# Patient Record
Sex: MALE | Race: WHITE | NOT HISPANIC OR LATINO | Employment: OTHER | URBAN - METROPOLITAN AREA
[De-identification: names, ages, dates, MRNs, and addresses within clinical notes are randomized per-mention and may not be internally consistent; named-entity substitution may affect disease eponyms.]

---

## 2024-08-20 ENCOUNTER — HOSPITAL ENCOUNTER (EMERGENCY)
Facility: HOSPITAL | Age: 89
Discharge: HOME | End: 2024-08-20
Attending: EMERGENCY MEDICINE
Payer: COMMERCIAL

## 2024-08-20 ENCOUNTER — APPOINTMENT (EMERGENCY)
Dept: RADIOLOGY | Facility: HOSPITAL | Age: 89
End: 2024-08-20
Payer: COMMERCIAL

## 2024-08-20 ENCOUNTER — HOSPITAL ENCOUNTER (INPATIENT)
Facility: HOSPITAL | Age: 89
LOS: 4 days | Discharge: NURSING, REHAB OR LONG-TERM CARE | DRG: 177 | End: 2024-08-24
Attending: EMERGENCY MEDICINE | Admitting: INTERNAL MEDICINE
Payer: COMMERCIAL

## 2024-08-20 ENCOUNTER — APPOINTMENT (EMERGENCY)
Dept: RADIOLOGY | Facility: HOSPITAL | Age: 89
DRG: 177 | End: 2024-08-20
Payer: COMMERCIAL

## 2024-08-20 VITALS
OXYGEN SATURATION: 95 % | RESPIRATION RATE: 18 BRPM | DIASTOLIC BLOOD PRESSURE: 65 MMHG | TEMPERATURE: 98 F | HEART RATE: 68 BPM | WEIGHT: 189.82 LBS | SYSTOLIC BLOOD PRESSURE: 142 MMHG

## 2024-08-20 DIAGNOSIS — K59.09 OTHER CONSTIPATION: ICD-10-CM

## 2024-08-20 DIAGNOSIS — D69.6 THROMBOCYTOPENIA (HCC): ICD-10-CM

## 2024-08-20 DIAGNOSIS — R78.81 BACTEREMIA: ICD-10-CM

## 2024-08-20 DIAGNOSIS — E03.9 HYPOTHYROIDISM, UNSPECIFIED TYPE: ICD-10-CM

## 2024-08-20 DIAGNOSIS — R26.2 AMBULATORY DYSFUNCTION: ICD-10-CM

## 2024-08-20 DIAGNOSIS — G20.A1 PARKINSON DISEASE: ICD-10-CM

## 2024-08-20 DIAGNOSIS — I72.3 ANEURYSM OF COMMON ILIAC ARTERY (HCC): ICD-10-CM
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
## 2024-08-20 DIAGNOSIS — R53.1 GENERALIZED WEAKNESS: ICD-10-CM

## 2024-08-20 DIAGNOSIS — G20.B1 PARKINSON'S DISEASE WITH DYSKINESIA, UNSPECIFIED WHETHER MANIFESTATIONS FLUCTUATE: ICD-10-CM

## 2024-08-20 DIAGNOSIS — S09.90XA CLOSED HEAD INJURY, INITIAL ENCOUNTER: Primary | ICD-10-CM

## 2024-08-20 DIAGNOSIS — I48.0 PAROXYSMAL ATRIAL FIBRILLATION (HCC): ICD-10-CM

## 2024-08-20 DIAGNOSIS — E53.8 B12 DEFICIENCY: ICD-10-CM

## 2024-08-20 DIAGNOSIS — I10 PRIMARY HYPERTENSION: ICD-10-CM

## 2024-08-20 DIAGNOSIS — U07.1 COVID-19: Primary | ICD-10-CM

## 2024-08-20 PROBLEM — I25.10 CAD (CORONARY ARTERY DISEASE): Status: ACTIVE | Noted: 2024-08-20

## 2024-08-20 PROBLEM — N28.1 RENAL CYST, LEFT: Status: ACTIVE | Noted: 2024-08-20

## 2024-08-20 LAB
25(OH)D3 SERPL-MCNC: 37.5 NG/ML (ref 30–100)
ALBUMIN SERPL BCG-MCNC: 3.7 G/DL (ref 3.5–5)
ALP SERPL-CCNC: 40 U/L (ref 34–104)
ALT SERPL W P-5'-P-CCNC: 14 U/L (ref 7–52)
ANION GAP SERPL CALCULATED.3IONS-SCNC: 4 MMOL/L (ref 4–13)
ANION GAP SERPL CALCULATED.3IONS-SCNC: 6 MMOL/L (ref 4–13)
APTT PPP: 30 SECONDS (ref 23–34)
AST SERPL W P-5'-P-CCNC: 16 U/L (ref 13–39)
ATRIAL RATE: 67 BPM
ATRIAL RATE: 80 BPM
BASOPHILS # BLD AUTO: 0.01 THOUSANDS/ÂΜL (ref 0–0.1)
BASOPHILS NFR BLD AUTO: 0 % (ref 0–1)
BILIRUB SERPL-MCNC: 0.92 MG/DL (ref 0.2–1)
BUN SERPL-MCNC: 24 MG/DL (ref 5–25)
BUN SERPL-MCNC: 27 MG/DL (ref 5–25)
CALCIUM SERPL-MCNC: 8.6 MG/DL (ref 8.4–10.2)
CALCIUM SERPL-MCNC: 8.8 MG/DL (ref 8.4–10.2)
CHLORIDE SERPL-SCNC: 108 MMOL/L (ref 96–108)
CHLORIDE SERPL-SCNC: 109 MMOL/L (ref 96–108)
CHOLEST SERPL-MCNC: 178 MG/DL
CK SERPL-CCNC: 61 U/L (ref 39–308)
CO2 SERPL-SCNC: 25 MMOL/L (ref 21–32)
CO2 SERPL-SCNC: 27 MMOL/L (ref 21–32)
CREAT SERPL-MCNC: 1 MG/DL (ref 0.6–1.3)
CREAT SERPL-MCNC: 1.12 MG/DL (ref 0.6–1.3)
CRP SERPL QL: 66.5 MG/L
D DIMER PPP FEU-MCNC: 1.01 UG/ML FEU
EOSINOPHIL # BLD AUTO: 0.01 THOUSAND/ÂΜL (ref 0–0.61)
EOSINOPHIL NFR BLD AUTO: 0 % (ref 0–6)
ERYTHROCYTE [DISTWIDTH] IN BLOOD BY AUTOMATED COUNT: 12.7 % (ref 11.6–15.1)
ERYTHROCYTE [DISTWIDTH] IN BLOOD BY AUTOMATED COUNT: 12.8 % (ref 11.6–15.1)
FERRITIN SERPL-MCNC: 166 NG/ML (ref 24–336)
FLUAV RNA RESP QL NAA+PROBE: NEGATIVE
FLUBV RNA RESP QL NAA+PROBE: NEGATIVE
GFR SERPL CREATININE-BSD FRML MDRD: 58 ML/MIN/1.73SQ M
GFR SERPL CREATININE-BSD FRML MDRD: 66 ML/MIN/1.73SQ M
GLUCOSE SERPL-MCNC: 121 MG/DL (ref 65–140)
GLUCOSE SERPL-MCNC: 121 MG/DL (ref 65–140)
GLUCOSE SERPL-MCNC: 90 MG/DL (ref 65–140)
HCT VFR BLD AUTO: 40.4 % (ref 36.5–49.3)
HCT VFR BLD AUTO: 41 % (ref 36.5–49.3)
HDLC SERPL-MCNC: 53 MG/DL
HGB BLD-MCNC: 12.9 G/DL (ref 12–17)
HGB BLD-MCNC: 13.2 G/DL (ref 12–17)
IMM GRANULOCYTES # BLD AUTO: 0.01 THOUSAND/UL (ref 0–0.2)
IMM GRANULOCYTES NFR BLD AUTO: 0 % (ref 0–2)
INR PPP: 1.15 (ref 0.85–1.19)
IRON SATN MFR SERPL: 11 % (ref 15–50)
IRON SERPL-MCNC: 30 UG/DL (ref 50–212)
LACTATE SERPL-SCNC: 0.8 MMOL/L (ref 0.5–2)
LDLC SERPL CALC-MCNC: 112 MG/DL (ref 0–100)
LYMPHOCYTES # BLD AUTO: 0.17 THOUSANDS/ÂΜL (ref 0.6–4.47)
LYMPHOCYTES NFR BLD AUTO: 3 % (ref 14–44)
MAGNESIUM SERPL-MCNC: 2.1 MG/DL (ref 1.9–2.7)
MCH RBC QN AUTO: 29.5 PG (ref 26.8–34.3)
MCH RBC QN AUTO: 29.7 PG (ref 26.8–34.3)
MCHC RBC AUTO-ENTMCNC: 31.9 G/DL (ref 31.4–37.4)
MCHC RBC AUTO-ENTMCNC: 32.2 G/DL (ref 31.4–37.4)
MCV RBC AUTO: 92 FL (ref 82–98)
MCV RBC AUTO: 92 FL (ref 82–98)
MONOCYTES # BLD AUTO: 0.49 THOUSAND/ÂΜL (ref 0.17–1.22)
MONOCYTES NFR BLD AUTO: 10 % (ref 4–12)
NEUTROPHILS # BLD AUTO: 4.42 THOUSANDS/ÂΜL (ref 1.85–7.62)
NEUTS SEG NFR BLD AUTO: 87 % (ref 43–75)
NRBC BLD AUTO-RTO: 0 /100 WBCS
P AXIS: 70 DEGREES
P AXIS: 75 DEGREES
PLATELET # BLD AUTO: 119 THOUSANDS/UL (ref 149–390)
PLATELET # BLD AUTO: 131 THOUSANDS/UL (ref 149–390)
PMV BLD AUTO: 11.6 FL (ref 8.9–12.7)
PMV BLD AUTO: 12.2 FL (ref 8.9–12.7)
POTASSIUM SERPL-SCNC: 3.8 MMOL/L (ref 3.5–5.3)
POTASSIUM SERPL-SCNC: 4.2 MMOL/L (ref 3.5–5.3)
PR INTERVAL: 180 MS
PR INTERVAL: 196 MS
PROCALCITONIN SERPL-MCNC: 0.12 NG/ML
PROT SERPL-MCNC: 6.1 G/DL (ref 6.4–8.4)
PROTHROMBIN TIME: 15.2 SECONDS (ref 12.3–15)
QRS AXIS: -59 DEGREES
QRS AXIS: -62 DEGREES
QRSD INTERVAL: 110 MS
QRSD INTERVAL: 110 MS
QT INTERVAL: 406 MS
QT INTERVAL: 406 MS
QTC INTERVAL: 429 MS
QTC INTERVAL: 468 MS
RBC # BLD AUTO: 4.38 MILLION/UL (ref 3.88–5.62)
RBC # BLD AUTO: 4.44 MILLION/UL (ref 3.88–5.62)
RSV RNA RESP QL NAA+PROBE: NEGATIVE
SARS-COV-2 RNA RESP QL NAA+PROBE: POSITIVE
SODIUM SERPL-SCNC: 139 MMOL/L (ref 135–147)
SODIUM SERPL-SCNC: 140 MMOL/L (ref 135–147)
T WAVE AXIS: 57 DEGREES
T WAVE AXIS: 59 DEGREES
T4 FREE SERPL-MCNC: 1.03 NG/DL (ref 0.61–1.12)
TIBC SERPL-MCNC: 271 UG/DL (ref 250–450)
TRIGL SERPL-MCNC: 66 MG/DL
TSH SERPL DL<=0.05 MIU/L-ACNC: 0.29 UIU/ML (ref 0.45–4.5)
UIBC SERPL-MCNC: 241 UG/DL (ref 155–355)
VENTRICULAR RATE: 67 BPM
VENTRICULAR RATE: 80 BPM
VIT B12 SERPL-MCNC: 261 PG/ML (ref 180–914)
WBC # BLD AUTO: 5.11 THOUSAND/UL (ref 4.31–10.16)
WBC # BLD AUTO: 7.28 THOUSAND/UL (ref 4.31–10.16)

## 2024-08-20 PROCEDURE — 84443 ASSAY THYROID STIM HORMONE: CPT

## 2024-08-20 PROCEDURE — 82948 REAGENT STRIP/BLOOD GLUCOSE: CPT

## 2024-08-20 PROCEDURE — 93010 ELECTROCARDIOGRAM REPORT: CPT | Performed by: INTERNAL MEDICINE

## 2024-08-20 PROCEDURE — 99284 EMERGENCY DEPT VISIT MOD MDM: CPT | Performed by: EMERGENCY MEDICINE

## 2024-08-20 PROCEDURE — 85379 FIBRIN DEGRADATION QUANT: CPT

## 2024-08-20 PROCEDURE — 84439 ASSAY OF FREE THYROXINE: CPT

## 2024-08-20 PROCEDURE — 93005 ELECTROCARDIOGRAM TRACING: CPT

## 2024-08-20 PROCEDURE — 87040 BLOOD CULTURE FOR BACTERIA: CPT | Performed by: EMERGENCY MEDICINE

## 2024-08-20 PROCEDURE — 80048 BASIC METABOLIC PNL TOTAL CA: CPT | Performed by: EMERGENCY MEDICINE

## 2024-08-20 PROCEDURE — 96365 THER/PROPH/DIAG IV INF INIT: CPT

## 2024-08-20 PROCEDURE — 87077 CULTURE AEROBIC IDENTIFY: CPT | Performed by: EMERGENCY MEDICINE

## 2024-08-20 PROCEDURE — 82607 VITAMIN B-12: CPT

## 2024-08-20 PROCEDURE — 84145 PROCALCITONIN (PCT): CPT | Performed by: EMERGENCY MEDICINE

## 2024-08-20 PROCEDURE — 85025 COMPLETE CBC W/AUTO DIFF WBC: CPT | Performed by: EMERGENCY MEDICINE

## 2024-08-20 PROCEDURE — 0241U HB NFCT DS VIR RESP RNA 4 TRGT: CPT | Performed by: EMERGENCY MEDICINE

## 2024-08-20 PROCEDURE — 87154 CUL TYP ID BLD PTHGN 6+ TRGT: CPT | Performed by: EMERGENCY MEDICINE

## 2024-08-20 PROCEDURE — 70450 CT HEAD/BRAIN W/O DYE: CPT

## 2024-08-20 PROCEDURE — 83540 ASSAY OF IRON: CPT

## 2024-08-20 PROCEDURE — 99285 EMERGENCY DEPT VISIT HI MDM: CPT | Performed by: EMERGENCY MEDICINE

## 2024-08-20 PROCEDURE — 36415 COLL VENOUS BLD VENIPUNCTURE: CPT | Performed by: EMERGENCY MEDICINE

## 2024-08-20 PROCEDURE — 82728 ASSAY OF FERRITIN: CPT

## 2024-08-20 PROCEDURE — 99285 EMERGENCY DEPT VISIT HI MDM: CPT

## 2024-08-20 PROCEDURE — 99223 1ST HOSP IP/OBS HIGH 75: CPT | Performed by: INTERNAL MEDICINE

## 2024-08-20 PROCEDURE — 82550 ASSAY OF CK (CPK): CPT | Performed by: EMERGENCY MEDICINE

## 2024-08-20 PROCEDURE — 85027 COMPLETE CBC AUTOMATED: CPT | Performed by: EMERGENCY MEDICINE

## 2024-08-20 PROCEDURE — 83550 IRON BINDING TEST: CPT

## 2024-08-20 PROCEDURE — 80053 COMPREHEN METABOLIC PANEL: CPT | Performed by: EMERGENCY MEDICINE

## 2024-08-20 PROCEDURE — 86140 C-REACTIVE PROTEIN: CPT

## 2024-08-20 PROCEDURE — 83605 ASSAY OF LACTIC ACID: CPT | Performed by: EMERGENCY MEDICINE

## 2024-08-20 PROCEDURE — 80061 LIPID PANEL: CPT

## 2024-08-20 PROCEDURE — 82306 VITAMIN D 25 HYDROXY: CPT

## 2024-08-20 PROCEDURE — 72125 CT NECK SPINE W/O DYE: CPT

## 2024-08-20 PROCEDURE — 85610 PROTHROMBIN TIME: CPT | Performed by: EMERGENCY MEDICINE

## 2024-08-20 PROCEDURE — 96361 HYDRATE IV INFUSION ADD-ON: CPT

## 2024-08-20 PROCEDURE — 71260 CT THORAX DX C+: CPT

## 2024-08-20 PROCEDURE — 74177 CT ABD & PELVIS W/CONTRAST: CPT

## 2024-08-20 PROCEDURE — 83735 ASSAY OF MAGNESIUM: CPT

## 2024-08-20 PROCEDURE — 85730 THROMBOPLASTIN TIME PARTIAL: CPT | Performed by: EMERGENCY MEDICINE

## 2024-08-20 RX ORDER — ROPINIROLE 1 MG/1
1 TABLET, FILM COATED ORAL 3 TIMES DAILY
COMMUNITY

## 2024-08-20 RX ORDER — ACETAMINOPHEN 10 MG/ML
1000 INJECTION, SOLUTION INTRAVENOUS ONCE
Status: COMPLETED | OUTPATIENT
Start: 2024-08-20 | End: 2024-08-20

## 2024-08-20 RX ORDER — SODIUM CHLORIDE 9 MG/ML
50 INJECTION, SOLUTION INTRAVENOUS CONTINUOUS
Status: DISCONTINUED | OUTPATIENT
Start: 2024-08-20 | End: 2024-08-21

## 2024-08-20 RX ORDER — LISINOPRIL 10 MG/1
10 TABLET ORAL DAILY
COMMUNITY
End: 2024-08-24

## 2024-08-20 RX ORDER — GUAIFENESIN 600 MG/1
600 TABLET, EXTENDED RELEASE ORAL EVERY 12 HOURS SCHEDULED
Status: DISCONTINUED | OUTPATIENT
Start: 2024-08-20 | End: 2024-08-24 | Stop reason: HOSPADM

## 2024-08-20 RX ORDER — ROPINIROLE 1 MG/1
1 TABLET, FILM COATED ORAL 3 TIMES DAILY
Status: DISCONTINUED | OUTPATIENT
Start: 2024-08-20 | End: 2024-08-24 | Stop reason: HOSPADM

## 2024-08-20 RX ORDER — POLYETHYLENE GLYCOL 3350 17 G/17G
17 POWDER, FOR SOLUTION ORAL DAILY PRN
Status: DISCONTINUED | OUTPATIENT
Start: 2024-08-20 | End: 2024-08-24 | Stop reason: HOSPADM

## 2024-08-20 RX ORDER — LEVOTHYROXINE SODIUM 100 UG/1
100 TABLET ORAL
Status: DISCONTINUED | OUTPATIENT
Start: 2024-08-21 | End: 2024-08-24 | Stop reason: HOSPADM

## 2024-08-20 RX ORDER — POTASSIUM CHLORIDE 1500 MG/1
20 TABLET, EXTENDED RELEASE ORAL ONCE
Status: COMPLETED | OUTPATIENT
Start: 2024-08-20 | End: 2024-08-20

## 2024-08-20 RX ORDER — ENOXAPARIN SODIUM 100 MG/ML
40 INJECTION SUBCUTANEOUS DAILY
Status: DISCONTINUED | OUTPATIENT
Start: 2024-08-20 | End: 2024-08-23

## 2024-08-20 RX ORDER — LEVOTHYROXINE SODIUM 125 UG/1
125 TABLET ORAL DAILY
Status: DISCONTINUED | OUTPATIENT
Start: 2024-08-20 | End: 2024-08-20

## 2024-08-20 RX ORDER — ACETAMINOPHEN 325 MG/1
650 TABLET ORAL EVERY 6 HOURS PRN
Status: DISCONTINUED | OUTPATIENT
Start: 2024-08-20 | End: 2024-08-24 | Stop reason: HOSPADM

## 2024-08-20 RX ORDER — LEVOTHYROXINE SODIUM 125 UG/1
125 TABLET ORAL DAILY
COMMUNITY
End: 2024-08-24

## 2024-08-20 RX ORDER — BENZONATATE 100 MG/1
100 CAPSULE ORAL 3 TIMES DAILY PRN
Status: DISCONTINUED | OUTPATIENT
Start: 2024-08-20 | End: 2024-08-21

## 2024-08-20 RX ADMIN — GUAIFENESIN 600 MG: 600 TABLET, EXTENDED RELEASE ORAL at 21:21

## 2024-08-20 RX ADMIN — ROPINIROLE 1 MG: 1 TABLET, FILM COATED ORAL at 16:59

## 2024-08-20 RX ADMIN — LEVOTHYROXINE SODIUM 125 MCG: 125 TABLET ORAL at 16:58

## 2024-08-20 RX ADMIN — SODIUM CHLORIDE 500 ML: 0.9 INJECTION, SOLUTION INTRAVENOUS at 11:07

## 2024-08-20 RX ADMIN — REMDESIVIR 200 MG: 100 INJECTION, POWDER, LYOPHILIZED, FOR SOLUTION INTRAVENOUS at 16:58

## 2024-08-20 RX ADMIN — ACETAMINOPHEN 1000 MG: 10 INJECTION INTRAVENOUS at 11:33

## 2024-08-20 RX ADMIN — ENOXAPARIN SODIUM 40 MG: 40 INJECTION SUBCUTANEOUS at 16:59

## 2024-08-20 RX ADMIN — POTASSIUM CHLORIDE 20 MEQ: 1500 TABLET, EXTENDED RELEASE ORAL at 15:48

## 2024-08-20 RX ADMIN — TICAGRELOR 60 MG: 60 TABLET ORAL at 21:22

## 2024-08-20 RX ADMIN — ROPINIROLE 1 MG: 1 TABLET, FILM COATED ORAL at 21:22

## 2024-08-20 RX ADMIN — SODIUM CHLORIDE 50 ML/HR: 0.9 INJECTION, SOLUTION INTRAVENOUS at 18:44

## 2024-08-20 RX ADMIN — IOHEXOL 100 ML: 350 INJECTION, SOLUTION INTRAVENOUS at 11:19

## 2024-08-20 NOTE — PLAN OF CARE
Problem: PAIN - ADULT  Goal: Verbalizes/displays adequate comfort level or baseline comfort level  Description: Interventions:  - Encourage patient to monitor pain and request assistance  - Assess pain using appropriate pain scale  - Administer analgesics based on type and severity of pain and evaluate response  - Implement non-pharmacological measures as appropriate and evaluate response  - Consider cultural and social influences on pain and pain management  - Notify physician/advanced practitioner if interventions unsuccessful or patient reports new pain  Outcome: Progressing     Problem: INFECTION - ADULT  Goal: Absence or prevention of progression during hospitalization  Description: INTERVENTIONS:  - Assess and monitor for signs and symptoms of infection  - Monitor lab/diagnostic results  - Monitor all insertion sites, i.e. indwelling lines, tubes, and drains  - Monitor endotracheal if appropriate and nasal secretions for changes in amount and color  - [place] appropriate cooling/warming therapies per order  - Administer medications as ordered  - Instruct and encourage patient and family to use good hand hygiene technique  - Identify and instruct in appropriate isolation precautions for identified infection/condition  Outcome: Progressing     Problem: SAFETY ADULT  Goal: Patient will remain free of falls  Description: INTERVENTIONS:  - Educate patient/family on patient safety including physical limitations  - Instruct patient to call for assistance with activity   - Consult OT/PT to assist with strengthening/mobility   - Keep Call bell within reach  - Keep bed low and locked with side rails adjusted as appropriate  - Keep care items and personal belongings within reach  - Initiate and maintain comfort rounds  - Make Fall Risk Sign visible to staff  - Offer Toileting every 2 Hours, in advance of need  - Initiate/Maintain bed alarm  - Obtain necessary fall risk management equipment: yes  - Apply yellow socks  and bracelet for high fall risk patients  - Consider moving patient to room near nurses station  Outcome: Progressing     Problem: RESPIRATORY - ADULT  Goal: Achieves optimal ventilation and oxygenation  Description: INTERVENTIONS:  - Assess for changes in respiratory status  - Assess for changes in mentation and behavior  - Position to facilitate oxygenation and minimize respiratory effort  - Oxygen administered by appropriate delivery if ordered  - Initiate smoking cessation education as indicated  - Encourage broncho-pulmonary hygiene including cough, deep breathe, Incentive Spirometry  - Assess the need for suctioning and aspirate as needed  - Assess and instruct to report SOB or any respiratory difficulty  - Respiratory Therapy support as indicated  Outcome: Progressing

## 2024-08-20 NOTE — ED PROVIDER NOTES
History  Chief Complaint   Patient presents with    Fall     Patient fell while ambulating to the bathroom, fell onto right cheek onto carpet     88-year-old male presenting to ED today after fall.  Was actually despite his wife however initially EMS report states that unwitnessed fall.  Patient states that he was ambulating to the bathroom with his walker when he fell on the tile and hit his head.  No LOC.  Wife witnessed the fall and he says that he was not on the ground for too long and wife corroborates the story.  With complaints of pain in his hip initially however no longer having pain.  Recently moved to the area from [place].        Prior to Admission Medications   Prescriptions Last Dose Informant Patient Reported? Taking?   levothyroxine 125 mcg tablet 8/19/2024  Yes Yes   Sig: Take 125 mcg by mouth daily   rOPINIRole (REQUIP) 1 mg tablet 8/19/2024  Yes Yes   Sig: Take 1 mg by mouth 3 (three) times a day   ticagrelor (Brilinta) 60 MG 8/19/2024  Yes Yes   Sig: Take 60 mg by mouth every 12 (twelve) hours      Facility-Administered Medications: None       Past Medical History:   Diagnosis Date    AA (aortic aneurysm) ([facility])     Coronary artery disease     CVA (cerebral vascular accident) ([facility])     Parkinsons     Pulmonary emboli ([facility])        Past Surgical History:   Procedure Laterality Date    CATARACT EXTRACTION      CORONARY ANGIOPLASTY WITH STENT PLACEMENT         History reviewed. No pertinent family history.  I have reviewed and agree with the history as documented.    E-Cigarette/Vaping    E-Cigarette Use Never User      E-Cigarette/Vaping Substances     Social History     Tobacco Use    Smoking status: Never    Smokeless tobacco: Never   Vaping Use    Vaping status: Never Used   Substance Use Topics    Alcohol use: Never    Drug use: Never       Review of Systems   Constitutional:  Negative for chills and fever.   HENT:  Negative for hearing loss.    Eyes:  Negative for visual disturbance.    Respiratory:  Negative for shortness of breath.    Cardiovascular:  Negative for chest pain.   Gastrointestinal:  Negative for abdominal pain, constipation, diarrhea, nausea and vomiting.   Genitourinary:  Negative for difficulty urinating.   Musculoskeletal:  Negative for myalgias.   Skin:  Negative for rash.   Neurological:  Positive for tremors. Negative for dizziness.   Psychiatric/Behavioral:  Negative for agitation.    All other systems reviewed and are negative.      Physical Exam  Physical Exam  Vitals and nursing note reviewed.   Constitutional:       General: He is not in acute distress.     Appearance: Normal appearance. He is not ill-appearing.   HENT:      Head: Normocephalic and atraumatic.      Right Ear: External ear normal.      Left Ear: External ear normal.      Nose: Nose normal. No congestion.      Mouth/Throat:      Mouth: Mucous membranes are moist.      Pharynx: Oropharynx is clear. No oropharyngeal exudate.   Eyes:      General:         Right eye: No discharge.         Left eye: No discharge.      Extraocular Movements: Extraocular movements intact.      Conjunctiva/sclera: Conjunctivae normal.      Pupils: Pupils are equal, round, and reactive to light.   Cardiovascular:      Rate and Rhythm: Normal rate and regular rhythm.      Pulses: Normal pulses.      Heart sounds: Normal heart sounds.   Pulmonary:      Effort: Pulmonary effort is normal. No respiratory distress.      Breath sounds: Normal breath sounds. No wheezing.   Abdominal:      General: Abdomen is flat. Bowel sounds are normal. There is no distension.      Palpations: Abdomen is soft.      Tenderness: There is no abdominal tenderness.   Musculoskeletal:         General: No swelling or deformity. Normal range of motion.      Cervical back: Normal range of motion. No rigidity.   Skin:     General: Skin is warm and dry.      Capillary Refill: Capillary refill takes less than 2 seconds.   Neurological:      General: No focal  deficit present.      Mental Status: He is alert and oriented to person, place, and time. Mental status is at baseline.      Cranial Nerves: No cranial nerve deficit.      Motor: No weakness.      Gait: Gait normal.      Comments: No focal neurological deficits present.  Pill-rolling tremor appreciated.   Psychiatric:         Mood and Affect: Mood normal.         Behavior: Behavior normal.         Vital Signs  ED Triage Vitals   Temperature Pulse Respirations Blood Pressure SpO2   08/20/24 0107 08/20/24 0102 08/20/24 0102 08/20/24 0102 08/20/24 0102   98 °F (36.7 °C) 77 18 166/78 95 %      Temp Source Heart Rate Source Patient Position - Orthostatic VS BP Location FiO2 (%)   08/20/24 0107 08/20/24 0102 08/20/24 0102 08/20/24 0102 --   Oral Monitor Lying Right arm       Pain Score       --                  Vitals:    08/20/24 0102 08/20/24 0130   BP: 166/78 142/65   Pulse: 77 68   Patient Position - Orthostatic VS: Lying Lying         Visual Acuity      ED Medications  Medications - No data to display    Diagnostic Studies  Results Reviewed       Procedure Component Value Units Date/Time    Basic metabolic panel [[id number]]  (Abnormal) Collected: 08/20/24 0107    Lab Status: Final result Specimen: Blood from Arm, Left Updated: 08/20/24 0237     Sodium 140 mmol/L      Potassium 4.2 mmol/L      Chloride 109 mmol/L      CO2 25 mmol/L      ANION GAP 6 mmol/L      BUN 27 mg/dL      Creatinine 1.12 mg/dL      Glucose 121 mg/dL      Calcium 8.8 mg/dL      eGFR 58 ml/min/1.73sq m     Narrative:      National Kid[employer] Disease Foundation guidelines for Chronic Kidney Disease (CKD):     Stage 1 with normal or high GFR (GFR > 90 mL/min/1.73 square meters)    Stage 2 Mild CKD (GFR = 60-89 mL/min/1.73 square meters)    Stage 3A Moderate CKD (GFR = 45-59 mL/min/1.73 square meters)    Stage 3B Moderate CKD (GFR = 30-44 mL/min/1.73 square meters)    Stage 4 Severe CKD (GFR = 15-29 mL/min/1.73 square meters)    Stage 5 End Stage CKD  (GFR <15 mL/min/1.73 square meters)  Note: GFR calculation is accurate only with a steady state creatinine    CK [[id number]]  (Normal) Collected: 08/20/24 0107    Lab Status: Final result Specimen: Blood from Arm, Left Updated: 08/20/24 0155     Total CK 61 U/L     CBC and Platelet [[id number]]  (Abnormal) Collected: 08/20/24 0107    Lab Status: Final result Specimen: Blood from Arm, Left Updated: 08/20/24 0113     WBC 7.28 Thousand/uL      RBC 4.44 Million/uL      Hemoglobin 13.2 g/dL      Hematocrit 41.0 %      MCV 92 fL      MCH 29.7 pg      MCHC 32.2 g/dL      RDW 12.7 %      Platelets 131 Thousands/uL      MPV 12.2 fL     Fingerstick Glucose (POCT) [[id number]]  (Normal) Collected: 08/20/24 0100    Lab Status: Final result Specimen: Blood Updated: 08/20/24 0105     POC Glucose 121 mg/dl                    CT head without contrast   Final Result by Ken Nicole MD (08/20 0249)      No intracranial hemorrhage or calvarial fracture.                  Workstation performed: [id number]         CT spine cervical without contrast   Final Result by Ken Nicole MD (08/20 0304)      No cervical spine fracture or traumatic malalignment.                  Workstation performed: [id number]                    Procedures  Procedures         ED Course  ED Course as of 08/20/24 0321   Tue Aug 20, 2024   0320 Basic metabolic panel(!)  No actionable derangements.   0320 Hemoglobin: 13.2  Within normal limits   0320 Total CK: 61  Within normal limits.   0320 Patient ambulated with a walker without any difficulty.  Will discharge back into wife and son's care.                                 SBIRT 20yo+      Flowsheet Row Most Recent Value   Initial Alcohol Screen: US AUDIT-C     1. How often do you have a drink containing alcohol? 0 Filed at: 08/20/2024 0106   2. How many drinks containing alcohol do you have on a typical day you are drinking?  0 Filed at: 08/20/2024 0106   3b. FEMALE Any Age, or MALE 65+: How often do you have 4 or  more drinks on one occassion? 0 Filed at: 08/20/2024 0106   Audit-C Score 0 Filed at: 08/20/2024 0106   ARNALDO: How many times in the past year have you...    Used an illegal drug or used a prescription medication for non-medical reasons? Never Filed at: 08/20/2024 0106                      Medical Decision Making  88-year-old male presenting to the ED today for fall from standing.  At this time we will check electrolyte panel to evaluate for hypokalemia which cause weakness as well as CBC to evaluate for anemia which could cause weakness.  Will check a CK given unclear etiology of how long patient was on the floor however it seems like it was not that long.  Will also check a CT head and CT C-spine to evaluate for intracranial hemorrhage or skull fracture or neck fracture.  If imaging studies are negative and lab work is otherwise unremarkable.  Will ambulate patient here with walker and discharged home if ambulates successfully.  Patient will need outpatient follow-up and establishing care.  Will give him information to follow-up with [facility] as well as with neurology.  Ambulatory referral to be placed.    Amount and/or Complexity of Data Reviewed  Labs: ordered. Decision-making details documented in ED Course.  Radiology: ordered.                 Disposition  Final diagnoses:   Closed head injury, initial encounter   Fall, initial encounter   Parkinson disease     Time reflects when diagnosis was documented in both MDM as applicable and the Disposition within this note       Time User Action Codes Description Comment    8/20/2024  3:18 AM Randal Jones Add [S09.90XA] Closed head injury, initial encounter     8/20/2024  3:18 AM Randal Jones Add [W19.XXXA] Fall, initial encounter     8/20/2024  3:18 AM Randal Jones Add [G20.A1] Parkinson disease           ED Disposition       ED Disposition   Discharge    Condition   Stable    Date/Time   Tue Aug 20, 2024 0318    Comment   Pavan Edward discharge to  home/self care.                   Follow-up Information       Follow up With Specialties Details Why Contact Info Additional Information    [facility] Family Medicine Call  To schedule an appointment to establish care with a primary care provider [address] [address] [phone] [facility], [address] [place] [address], [place], [place], [address] [phone]    [facility] Neurology [facility] Schedule an appointment as soon as possible for a visit  for follow up [address] [facility] [address] [phone] [facility], [address], [place], [address] [phone]            Patient's Medications   Discharge Prescriptions    No medications on file           PDMP Review       None            ED Provider  Electronically Signed by             Randal Jones MD  08/20/24 032

## 2024-08-20 NOTE — H&P
History and Physical - [facility]  Family Medicine Residency     Patient Information: Pavan Edward 88 y.o. male MRN: [id number]  Unit/Bed#: [address] [place] [address] Encounter: [id number]  Admitting Physician: Vanesa Benitez MD   PCP: No primary care provider on file.  Date of Admission:  08/20/24     Assessment and Plan     * COVID  Assessment & Plan  Presented with abdominal pain, back pain, s/p fall, as well as fever 101.0 F prior to arrival.     -COVID +   -WBC 5.11  -Procal 0.12  -LA 0.8  -INR 15.2    Continue mild COVID pathway   Remdesivir 200 mg on 8/20, followed by 100 mg for 2 doses (8/21-8/22)  CRP pending  D-dimer Pending  A.m. Pro-Zi pending  Monitor oxygenation; supplement as needed   Mucinex for congestion   Tessalon Perles PRN coughing   Tylenol as needed for pain or fever    Weakness  Assessment & Plan  Pt presents with generalized weakness.     Likely secondary to COVID vs. Deconditioning     Continue to treat Covid and see how pt responds  TSH pending  Iron panel pending    PT/OT    Parkinson's disease  Assessment & Plan  Chronic.  Home medication 1 mg Requip 3 times daily.  Per wife, patient and her were not thrilled with past neurologist and has to find [facility] in the area.    Continue home medication  Continue to follow-up outpatient neurology    Renal cyst, left  Assessment & Plan  CT abdomen showed 1.5 cm left renal hyperdense nodule likely a hemorrhagic or proteinaceous cyst.     Continue to monitor    Aneurysm of common iliac artery (HCC)  Assessment & Plan  Incidental finding 2.4 cm right common iliac artery aneurysm on CT chest/abd/pelvis.     Follow-up outpatient    Ambulatory dysfunction  Assessment & Plan  S/p fall.     CT head: No intracranial hemorrhage or calvarial fracture.  CT spine: No cervical spine fracture or traumatic malalignment.    PT/OT    Hypertension  Assessment & Plan  Initially elevated upon admission.  Previously on 10 mg lisinopril.  Per patient and wife, not on  any hypertension medication at this time.    CAD (coronary artery disease)  Assessment & Plan  Home medication of Brilanta 60 mg BID.     Continue home medication.    Hypothyroidism  Assessment & Plan  Home medication levothyroxine 125 mcg daily. TSH 0.293.    Repeat T4 pending   Decrease home dose to levothyroxine 100 mcg daily.  Recommend patient to monitor outpatient and repeat TSH with T4 in 6 to 8 weeks         Fluids: 500 mL NS bolus   Electrolyte repletion: Replete as needed.  Nutrition:        Diet Orders   (From admission, onward)                 Start     Ordered    08/20/24 1534  Diet Cardiovascular; Cardiac  Diet effective now        References:    Adult Nutrition Support Algorithm    RD Therapeutic Diet Order Protocol   Question Answer Comment   Diet Type Cardiovascular    Cardiac Cardiac    RD to adjust diet per protocol? Yes        08/20/24 1536    08/20/24 1350  Room Service  Once        Question:  Type of Service  Answer:  Room Service - Appropriate with Assistance    08/20/24 1350                   VTE Prophylaxis:   High Risk (Score >/= 5) - Pharmacological DVT Prophylaxis Ordered: enoxaparin (Lovenox). Sequential Compression Devices Ordered.  Code Status: Level 1 - Full Code  Anticipated Length of Stay:  Patient will be admitted on an Inpatient basis with an anticipated length of stay of  < than 2 midnights.     Justification for Hospital Stay: Covid, Weakness  Total Time for Visit, including Counseling / Coordination of Care: <30 mins. Greater than 50% of this total time spent on direct patient counseling and coordination of care.      Chief Complaint:     Chief Complaint   Patient presents with    Abdominal Pain    Back Pain     Pt fell around 1AM last night and came in to the ED this AM. Pt here for abdominal pain, back pain, rigidity. Family states he wasn't able to get up or walk. Before arrival pt had a fever of 101       Subjective      History of Present Illness:     Pavan Edward is a 88  y.o. male w/ PMH hypothyroidism, CAD, hypertension, common iliac aneurysm who presents with headache, rhinorrhea and cough.  Patient states that he just moved from [place] recently to this area.  Patient reports chills prior to coming ED but no reported fevers.  Patient states he did not consume much water today.  Per wife and patient, patient fell yesterday, became rigid and was difficult to lift up.  Patient has history of Parkinson's and has resting tremors at baseline.  Denies head trauma, ill contacts, chest pain, shortness of breath or any other complaints at this time.     Review of Systems:  Review of Systems   Constitutional:  Negative for chills and fever.   HENT:  Positive for congestion and rhinorrhea. Negative for ear pain and sore throat.    Eyes:  Negative for pain and visual disturbance.   Respiratory:  Positive for cough. Negative for shortness of breath.    Cardiovascular:  Negative for chest pain and palpitations.   Gastrointestinal:  Positive for abdominal pain. Negative for vomiting.   Genitourinary:  Negative for dysuria and hematuria.   Musculoskeletal:  Positive for back pain. Negative for arthralgias.   Skin:  Negative for color change and rash.   Neurological:  Positive for headaches. Negative for seizures and syncope.   All other systems reviewed and are negative.       Past Medical History:   Diagnosis Date    AA (aortic aneurysm) ([facility])     Coronary artery disease     CVA (cerebral vascular accident) ([facility])     Parkinsons     Pulmonary emboli ([facility])      Past Surgical History:   Procedure Laterality Date    CATARACT EXTRACTION      CORONARY ANGIOPLASTY WITH STENT PLACEMENT       No Known Allergies  Prior to Admission Medications   Prescriptions Last Dose Informant Patient Reported? Taking?   levothyroxine 125 mcg tablet 8/19/2024 at 0700  Yes Yes   Sig: Take 125 mcg by mouth daily   rOPINIRole (REQUIP) 1 mg tablet 8/19/2024 at 0800  Yes Yes   Sig: Take 1 mg by mouth 3 (three) times a day  "  ticagrelor (Brilinta) 60 MG 8/19/2024 at 1700  Yes Yes   Sig: Take 60 mg by mouth every 12 (twelve) hours      Facility-Administered Medications: None     Social History     Socioeconomic History    Marital status: /Civil Union     Spouse name: Not on file    Number of children: Not on file    Years of education: Not on file    Highest education level: Not on file   Occupational History    Not on file   Tobacco Use    Smoking status: Never    Smokeless tobacco: Never   Vaping Use    Vaping status: Never Used   Substance and Sexual Activity    Alcohol use: Never    Drug use: Never    Sexual activity: Not on file   Other Topics Concern    Not on file   Social History Narrative    Not on file     Social Determinants of Health     Financial Resource Strain: Not on file   Food Insecurity: No Food Insecurity (8/20/2024)    Hunger Vital Sign     Worried About Running Out of Food in the Last Year: Never true     Ran Out of Food in the Last Year: Never true   Transportation Needs: No Transportation Needs (8/20/2024)    PRAPARE - Transportation     Lack of Transportation (Medical): No     Lack of Transportation (Non-Medical): No   Physical Activity: Not on file   Stress: Not on file   Social Connections: Not on file   Intimate Partner Violence: Not on file   Housing Stability: Low Risk  (8/20/2024)    Housing Stability Vital Sign     Unable to Pay for Housing in the Last Year: No     Number of Times Moved in the Last Year: 1     Homeless in the Last Year: No     History reviewed. No pertinent family history.     Patient Pre-hospital Living Situation: home  Patient Pre-hospital Level of Mobility: limited  Patient Pre-hospital Diet Restrictions: n/a          Objective     Physical Exam:   Vitals:   Blood Pressure: 115/84 (08/20/24 1524)  Pulse: 58 (08/20/24 1524)  Temperature: 98.9 °F (37.2 °C) (08/20/24 1524)  Temp Source: Oral (08/20/24 1408)  Respirations: 18 (08/20/24 1524)  Height: 6' 1\" (185.4 cm) (08/20/24 " 1408)  Weight - Scale: 80.7 kg (177 lb 15.6 oz) (08/20/24 1537)  SpO2: 94 % (08/20/24 1524)     Physical Exam  Constitutional:       Appearance: Normal appearance. He is ill-appearing.   HENT:      Head: Normocephalic and atraumatic.   Eyes:      General:         Right eye: No discharge.         Left eye: No discharge.      Conjunctiva/sclera: Conjunctivae normal.   Cardiovascular:      Rate and Rhythm: Normal rate and regular rhythm.      Pulses: Normal pulses.      Heart sounds: Normal heart sounds. No murmur heard.  Pulmonary:      Effort: Pulmonary effort is normal. No respiratory distress.      Breath sounds: Normal breath sounds.   Abdominal:      General: Abdomen is flat.      Palpations: Abdomen is soft.      Tenderness: There is no abdominal tenderness.   Musculoskeletal:      Cervical back: Normal range of motion and neck supple.   Skin:     General: Skin is warm and dry.      Capillary Refill: Capillary refill takes less than 2 seconds.   Neurological:      General: No focal deficit present.      Mental Status: He is alert.      Cranial Nerves: No cranial nerve deficit.      Sensory: No sensory deficit.      Motor: No weakness.      Comments: Resting tremors   Psychiatric:         Mood and Affect: Mood normal.            Lab Results: I have personally reviewed pertinent reports.    Results from last 7 days   Lab Units 08/20/24  1103   WBC Thousand/uL 5.11   HEMOGLOBIN g/dL 12.9   HEMATOCRIT % 40.4   PLATELETS Thousands/uL 119*   SEGS PCT % 87*   LYMPHO PCT % 3*   MONO PCT % 10   EOS PCT % 0     Results from last 7 days   Lab Units 08/20/24  1103 08/20/24  0107   POTASSIUM mmol/L 3.8 4.2   CHLORIDE mmol/L 108 109*   CO2 mmol/L 27 25   BUN mg/dL 24 27*   CREATININE mg/dL 1.00 1.12   CALCIUM mg/dL 8.6 8.8   ALK PHOS U/L 40  --    ALT U/L 14  --    AST U/L 16  --    EGFR ml/min/1.73sq m 66 58   MAGNESIUM mg/dL 2.1  --      Results from last 7 days   Lab Units 08/20/24  1103   INR  1.15     Results from last 7  "days   Lab Units 08/20/24  1103   LACTIC ACID mmol/L 0.8                    Invalid input(s): \"URIBILINOGEN\"             Results from last 7 days   Lab Units 08/20/24  0107   CK TOTAL U/L 61        Imaging: I have personally reviewed pertinent reports.    CT chest abdomen pelvis w contrast    Result Date: 8/20/2024  CT chest: No acute thoracic process. No fracture. Chronic findings and negatives as above. CT abdomen and pelvis: No evidence of acute abdominopelvic process. No fracture. Atherosclerotic disease. 2.4 cm right common iliac artery aneurysm. 1.5 cm left renal hyperdense nodule likely a hemorrhagic or proteinaceous cyst. This could be followed up with renal ultrasound in 6 months. Additional chronic findings and negatives as above. Workstation performed: [id number]     CT spine cervical without contrast    Result Date: 8/20/2024  No cervical spine fracture or traumatic malalignment. Workstation performed: [id number]     CT head without contrast    Result Date: 8/20/2024  No intracranial hemorrhage or calvarial fracture. Workstation performed: [id number]            Entire H&P was discussed with Dr. Benitez who agreed to what is noted above     ** Please Note: This note has been constructed using a voice recognition system **     Tevin Oneil MD  08/20/24  6:18 PM   "

## 2024-08-20 NOTE — Clinical Note
Case was discussed with FP and the patient's admission status was agreed to be Admission Status: observation status to the service of Dr. Benitez.

## 2024-08-20 NOTE — ED NOTES
Pt ambulated with walker.  Pts states he has no complaints and family states he is walking normal.     Natalie Higgins RN  08/20/24 [id number]

## 2024-08-20 NOTE — ASSESSMENT & PLAN NOTE
Initially elevated upon admission. Home medication 10 mg lisinopril.     Lisinopril 10 mg daily  Continue to monitor blood pressure

## 2024-08-20 NOTE — ASSESSMENT & PLAN NOTE
S/p fall.     CT head: No intracranial hemorrhage or calvarial fracture.  CT spine: No cervical spine fracture or traumatic malalignment.    PT/OT at [facility]

## 2024-08-20 NOTE — ASSESSMENT & PLAN NOTE
CT abdomen showed 1.5 cm left renal hyperdense nodule likely a hemorrhagic or proteinaceous cyst.     Continue to monitor

## 2024-08-20 NOTE — ASSESSMENT & PLAN NOTE
Presented with abdominal pain, back pain, s/p fall, as well as fever 101.0 F prior to arrival.     -COVID +   -WBC 5.11  -Procal 0.12  -LA 0.8  -INR 15.2  -CRP 66>88  -D-dimer 1.01, corrected 0.88    Continue mild COVID pathway   Completed Remdesivir 200 mg on 8/20, followed by 100 mg for 2 doses (8/21-8/22)  Monitor oxygenation; supplement as needed; did not require any O2  Mucinex for congestion during hospital stay   Robitussin every 4 hours PRN continue at Discharge  Tylenol as needed for pain or fever  See Positive blood culture  Pt is feeling much batter at discahrge

## 2024-08-20 NOTE — ASSESSMENT & PLAN NOTE
Chronic.  Home medication 1 mg Requip 3 times daily.  Per wife, patient and her were not thrilled with past neurologist and has to find new neurologist in the area.    Continue home medication  Continue to follow-up outpatient neurology  Neurology referral made

## 2024-08-20 NOTE — ASSESSMENT & PLAN NOTE
Home medication levothyroxine 125 mcg daily. TSH 0.293.    Decrease home dose to levothyroxine 100 mcg daily.  Recommend patient to monitor outpatient and repeat TSH with T4 in 6 to 8 weeks

## 2024-08-20 NOTE — ASSESSMENT & PLAN NOTE
Initially elevated upon admission.  Previously on 10 mg lisinopril.  Per patient and wife, not on any hypertension medication at this time.    Pt was started on lisinopril 2.5 mg daily, BP stable  Discharged on lisinopril 2.5 mg daily

## 2024-08-20 NOTE — ED PROVIDER NOTES
History  Chief Complaint   Patient presents with    Abdominal Pain    Back Pain     Pt fell around 1AM last night and came in to the ED this AM. Pt here for abdominal pain, back pain, rigidity. Family states he wasn't able to get up or walk. Before arrival pt had a fever of 101     Pt is an 89yo M who presents for abdominal and back pain.  Patient had a fall earlier today.  Family states that he slid down and did not truly fall.  Patient cannot tell me why he fell.  Patient was seen in the ED, had a negative workup, and was able to ambulate and therefore was discharged home.  Wife states that he slept through the night and then this morning was unable to get out of bed so was brought in for further evaluation.  Patient is complaining of mid abdominal pain as well as back pain that he states started this morning.  Patient denying any other complaints.  Wife does state that patient has had a cough and rhinorrhea and she has also had mild URI symptoms.  She also states that this happens to patient once previously and he was found to have pneumonia.  Patient does have a tremor at baseline secondary to Parkinson's.  Patient ambulates with a walker but was unable to do so this morning.  Family also reports that patient had a temperature at home of 101 this morning.        Prior to Admission Medications   Prescriptions Last Dose Informant Patient Reported? Taking?   levothyroxine 125 mcg tablet   Yes No   Sig: Take 125 mcg by mouth daily   rOPINIRole (REQUIP) 1 mg tablet   Yes No   Sig: Take 1 mg by mouth 3 (three) times a day   ticagrelor (Brilinta) 60 MG   Yes No   Sig: Take 60 mg by mouth every 12 (twelve) hours      Facility-Administered Medications: None       Past Medical History:   Diagnosis Date    AA (aortic aneurysm) ([facility])     Coronary artery disease     CVA (cerebral vascular accident) ([facility])     Parkinsons     Pulmonary emboli ([facility])        Past Surgical History:   Procedure Laterality Date    CATARACT EXTRACTION       CORONARY ANGIOPLASTY WITH STENT PLACEMENT         No family history on file.  I have reviewed and agree with the history as documented.    E-Cigarette/Vaping    E-Cigarette Use Never User      E-Cigarette/Vaping Substances     Social History     Tobacco Use    Smoking status: Never    Smokeless tobacco: Never   Vaping Use    Vaping status: Never Used   Substance Use Topics    Alcohol use: Never    Drug use: Never       Review of Systems   Constitutional:  Positive for fever (T max 101 at home).   HENT:  Positive for rhinorrhea.    Respiratory:  Positive for cough and shortness of breath.    Gastrointestinal:  Positive for abdominal pain.   Musculoskeletal:  Positive for back pain.   Neurological:  Positive for weakness.   All other systems reviewed and are negative.      Physical Exam  Physical Exam  Vitals reviewed.   Constitutional:       General: He is not in acute distress.     Appearance: He is well-developed. He is not toxic-appearing or diaphoretic.   HENT:      Head: Normocephalic and atraumatic.      Right Ear: External ear normal.      Left Ear: External ear normal.      Nose: Nose normal.      Mouth/Throat:      Pharynx: Oropharynx is clear.   Eyes:      Extraocular Movements: Extraocular movements intact.      Pupils: Pupils are equal, round, and reactive to light.   Cardiovascular:      Rate and Rhythm: Normal rate and regular rhythm.      Heart sounds: Normal heart sounds.   Pulmonary:      Effort: Pulmonary effort is normal. No respiratory distress.      Breath sounds: Normal breath sounds. No wheezing.   Abdominal:      General: Bowel sounds are normal. There is no distension.      Palpations: Abdomen is soft.      Tenderness: There is abdominal tenderness (diffuse).   Musculoskeletal:         General: Normal range of motion.      Cervical back: Neck supple.      Right lower leg: No edema.      Left lower leg: No edema.   Skin:     General: Skin is warm and dry.      Capillary Refill: Capillary  refill takes less than 2 seconds.      Coloration: Skin is not pale.   Neurological:      General: No focal deficit present.      Mental Status: He is alert and oriented to person, place, and time.      Cranial Nerves: No cranial nerve deficit.      Sensory: No sensory deficit.      Motor: Tremor present.      Coordination: Coordination normal.   Psychiatric:         Speech: Speech normal.         Behavior: Behavior is cooperative.         Vital Signs  ED Triage Vitals   Temperature Pulse Respirations Blood Pressure SpO2   08/20/24 1033 08/20/24 1034 08/20/24 1034 08/20/24 1034 08/20/24 1034   99.7 °F (37.6 °C) 64 19 152/77 97 %      Temp Source Heart Rate Source Patient Position - Orthostatic VS BP Location FiO2 (%)   08/20/24 1033 08/20/24 1034 08/20/24 1309 08/20/24 1034 --   Tympanic Monitor Lying Left arm       Pain Score       08/20/24 1034       5           Vitals:    08/20/24 1034 08/20/24 1300 08/20/24 1309 08/20/24 1330   BP: 152/77  (!) 190/81 (!) 174/75   Pulse: 64 58 62 56   Patient Position - Orthostatic VS:   Lying          Visual Acuity      ED Medications  Medications   potassium chloride (Klor-Con M20) CR tablet 20 mEq (has no administration in time range)   acetaminophen (Ofirmev) injection 1,000 mg (1,000 mg Intravenous New Bag 8/20/24 1133)   sodium chloride 0.9 % bolus 500 mL (0 mL Intravenous Stopped 8/20/24 1322)   iohexol (OMNIPAQUE) 350 MG/ML injection (MULTI-DOSE) 100 mL (100 mL Intravenous Given 8/20/24 1119)       Diagnostic Studies  Results Reviewed       Procedure Component Value Units Date/Time    Lipid Panel with Direct LDL reflex [[id number]] Collected: 08/20/24 1103    Lab Status: In process Specimen: Blood from Arm, Right Updated: 08/20/24 1336    Magnesium [[id number]] Collected: 08/20/24 1103    Lab Status: In process Specimen: Blood from Arm, Right Updated: 08/20/24 1336    TSH, 3rd generation with Free T4 reflex [[id number]] Collected: 08/20/24 1103    Lab Status: In process  Specimen: Blood from Arm, Right Updated: 08/20/24 1336    Vitamin D 25 hydroxy [[id number]]     Lab Status: No result Specimen: Blood     Vitamin B12 [[id number]]     Lab Status: No result Specimen: Blood     FLU/RSV/COVID - if FLU/RSV clinically relevant [[id number]]  (Abnormal) Collected: 08/20/24 1103    Lab Status: Final result Specimen: Nares from Nose Updated: 08/20/24 1148     SARS-CoV-2 Positive     INFLUENZA A PCR Negative     INFLUENZA B PCR Negative     RSV PCR Negative    Narrative:      This test has been performed using the CoV-2/Flu/RSV plus assay on the [employer] platform. This test has been validated by the  and verified by the performing laboratory.     This test is designed to amplify and detect the following: nucleocapsid (N), envelope (E), and RNA-dependent RNA polymerase (RdRP) genes of the SARS-CoV-2 genome; matrix (M), basic polymerase (PB2), and acidic protein (PA) segments of the influenza A genome; matrix (M) and non-structural protein (NS) segments of the influenza B genome, and the nucleocapsid genes of RSV A and RSV B.     Positive results are indicative of the presence of Flu A, Flu B, RSV, and/or SARS-CoV-2 RNA. Positive results for SARS-CoV-2 or suspected novel influenza should be reported to state, local, or federal health departments according to local reporting requirements.      All results should be assessed in conjunction with clinical presentation and other laboratory markers for clinical management.     FOR PEDIATRIC PATIENTS - copy/paste COVID Guidelines URL to browser: [email or web address]       Procalcitonin [[id number]]  (Normal) Collected: 08/20/24 1103    Lab Status: Final result Specimen: Blood from Arm, Right Updated: 08/20/24 1141     Procalcitonin 0.12 ng/ml     Protime-INR [[id number]]  (Abnormal) Collected: 08/20/24 1103    Lab Status: Final result Specimen: Blood from Arm, Right Updated:  08/20/24 1132     Protime 15.2 seconds      INR 1.15    Narrative:      INR Therapeutic Range    Indication                                             INR Range      Atrial Fibrillation                                               2.0-3.0  Hypercoagulable State                                    2.0.2.3  Left Ventricular Asist Device                            2.0-3.0  Mechanical Heart Valve                                  -    Aortic(with afib, MI, embolism, HF, LA enlargement,    and/or coagulopathy)                                     2.0-3.0 (2.5-3.5)     Mitral                                                             2.5-3.5  Prosthetic/Bioprosthetic Heart Valve               2.0-3.0  Venous thromboembolism (VTE: VT, PE        2.0-3.0    APTT [[id number]]  (Normal) Collected: 08/20/24 1103    Lab Status: Final result Specimen: Blood from Arm, Right Updated: 08/20/24 1132     PTT 30 seconds     Comprehensive metabolic panel [[id number]]  (Abnormal) Collected: 08/20/24 1103    Lab Status: Final result Specimen: Blood from Arm, Right Updated: 08/20/24 1131     Sodium 139 mmol/L      Potassium 3.8 mmol/L      Chloride 108 mmol/L      CO2 27 mmol/L      ANION GAP 4 mmol/L      BUN 24 mg/dL      Creatinine 1.00 mg/dL      Glucose 90 mg/dL      Calcium 8.6 mg/dL      AST 16 U/L      ALT 14 U/L      Alkaline Phosphatase 40 U/L      Total Protein 6.1 g/dL      Albumin 3.7 g/dL      Total Bilirubin 0.92 mg/dL      eGFR 66 ml/min/1.73sq m     Narrative:      National Kidney Disease Foundation guidelines for Chronic Kidney Disease (CKD):     Stage 1 with normal or high GFR (GFR > 90 mL/min/1.73 square meters)    Stage 2 Mild CKD (GFR = 60-89 mL/min/1.73 square meters)    Stage 3A Moderate CKD (GFR = 45-59 mL/min/1.73 square meters)    Stage 3B Moderate CKD (GFR = 30-44 mL/min/1.73 square meters)    Stage 4 Severe CKD (GFR = 15-29 mL/min/1.73 square meters)    Stage 5 End Stage CKD (GFR <15 mL/min/1.73 square  meters)  Note: GFR calculation is accurate only with a steady state creatinine    Lactic acid [[id number]]  (Normal) Collected: 08/20/24 1103    Lab Status: Final result Specimen: Blood from Arm, Right Updated: 08/20/24 1130     LACTIC ACID 0.8 mmol/L     Narrative:      Result may be elevated if tourniquet was used during collection.    CBC and differential [[id number]]  (Abnormal) Collected: 08/20/24 1103    Lab Status: Final result Specimen: Blood from Arm, Right Updated: 08/20/24 1119     WBC 5.11 Thousand/uL      RBC 4.38 Million/uL      Hemoglobin 12.9 g/dL      Hematocrit 40.4 %      MCV 92 fL      MCH 29.5 pg      MCHC 31.9 g/dL      RDW 12.8 %      MPV 11.6 fL      Platelets 119 Thousands/uL      nRBC 0 /100 WBCs      Segmented % 87 %      Immature Grans % 0 %      Lymphocytes % 3 %      Monocytes % 10 %      Eosinophils Relative 0 %      Basophils Relative 0 %      Absolute Neutrophils 4.42 Thousands/µL      Absolute Immature Grans 0.01 Thousand/uL      Absolute Lymphocytes 0.17 Thousands/µL      Absolute Monocytes 0.49 Thousand/µL      Eosinophils Absolute 0.01 Thousand/µL      Basophils Absolute 0.01 Thousands/µL     Blood culture #1 [[id number]] Collected: 08/20/24 1103    Lab Status: In process Specimen: Blood from Arm, Right Updated: 08/20/24 1108    UA w Reflex to Microscopic w Reflex to Culture [[id number]]     Lab Status: No result Specimen: Urine                    CT chest abdomen pelvis w contrast   Final Result by Arthur Woodruff MD (08/20 1247)      CT chest:      No acute thoracic process.      No fracture.      Chronic findings and negatives as above.      CT abdomen and pelvis:      No evidence of acute abdominopelvic process.      No fracture.      Atherosclerotic disease. 2.4 cm right common iliac artery aneurysm.      1.5 cm left renal hyperdense nodule likely a hemorrhagic or proteinaceous cyst. This could be followed up with renal ultrasound in 6 months.      Additional  chronic findings and negatives as above.                     Workstation performed: [id number]                    Procedures  Procedures         ED Course  ED Course as of 08/20/24 1340   Tue Aug 20, 2024   1026 Per chart review, pt seen early this AM after unwitnessed fall and had basic labs as well as CT head and C-spine performed that were unremarkable. Pt was discharged home.    1100 Procedure Note: EKG  Date/Time: 08/20/24 11:00 AM   Interpreted by: Tianna Saldana MD  Indications / Diagnosis: Weakness  ECG reviewed by me, the ED Physician: yes   The EKG demonstrates:  Rhythm: normal sinus  Intervals: normal intervals  Axis: LAD  QRS/Blocks: incomplete RBBB  ST Changes: No acute ST Changes, no STD/MARI.   1122 CBC and differential(!)  Reviewed and without actionable derangement.    1122 WBC: 5.11  WNL   1130 LACTIC ACID: 0.8  WNL   1134 Comprehensive metabolic panel(!)  Reviewed and without actionable derangement.    1134 POCT INR: 1.15  WNL   1134 PTT: 30  WNL   1142 Procalcitonin: 0.12  Negative.    1150 SARS-COV-2(!): Positive  Likely cause of symptoms.    1158 Pt reassessed and resting comfortably. O2 97%. Pt and family made aware of results thus far and COVID+ status.    1236 Awaiting CT read.    1252 CT chest abdomen pelvis w contrast  No acute findings.                                                Medical Decision Making  Pt is a 89yo M who presents with abdominal and back pain.     Differential diagnosis to include but not limited to trauma, infection, dehydration, viral syndrome, electrolyte abnormality, arrhythmia.  As pt with fever at home, will obtain sepsis labs as pt at risk for sepsis. Will also obtain additional imaging. See ED course for results and details.    Plan to admit pt to FP. Pt discussed with admitting team and admission orders placed. Pt admitted without incident.         Amount and/or Complexity of Data Reviewed  Labs: ordered. Decision-making details documented in ED  Course.  Radiology: ordered. Decision-making details documented in ED Course.    Risk  Prescription drug management.  Decision regarding hospitalization.                 Disposition  Final diagnoses:   Ambulatory dysfunction   Generalized weakness   COVID-19     Time reflects when diagnosis was documented in both MDM as applicable and the Disposition within this note       Time User Action Codes Description Comment    8/20/2024 12:59 PM Tianna Saldana Add [R26.2] Ambulatory dysfunction     8/20/2024 12:59 PM Tianna Saldana Add [R53.1] Generalized weakness     8/20/2024 12:59 PM Tianna Saldana Add [U07.1] COVID-19     8/20/2024 12:59 PM Tianna Saldana Modify [R26.2] Ambulatory dysfunction     8/20/2024 12:59 PM Tianna Saldana Modify [U07.1] COVID-19           ED Disposition       ED Disposition   Admit    Condition   Stable    Date/Time   Tue Aug 20, 2024  1:04 PM    Comment   Case was discussed with FP and the patient's admission status was agreed to be Admission Status: inpatient status to the service of Dr. Benitez.               Follow-up Information    None         Patient's Medications   Discharge Prescriptions    No medications on file       No discharge procedures on file.    PDMP Review       None            ED Provider  Electronically Signed by             Tianna Saldana MD  08/20/24 1167

## 2024-08-20 NOTE — ASSESSMENT & PLAN NOTE
History of iliac artery aneurysm, 2.4 cm right common iliac artery aneurysm seen on CT chest/abd/pelvis.     Follow-up outpatient  Vascular surgery referral made

## 2024-08-20 NOTE — ASSESSMENT & PLAN NOTE
Pt presents with generalized weakness.     Likely secondary to COVID vs. Deconditioning     Continue to treat Covid and see how pt responds  TSH 0.293, but T4 WNL Pt is on levothyroxine was lowered 100mcg  Iron panel: iron low    PT/OT recommend STR  Discharged to STR for PT/OT

## 2024-08-20 NOTE — DISCHARGE INSTRUCTIONS
Your CT scan and lab workup was otherwise unremarkable.  I placed referrals for family practice as well as for neurology.  You can call their office below to also help facilitate follow-up appointments.    Return to ER if you develop any new or worrisome symptoms to you.  Please continue your medications as prescribed until you are seen in follow-up.

## 2024-08-20 NOTE — PLAN OF CARE
Problem: PAIN - ADULT  Goal: Verbalizes/displays adequate comfort level or baseline comfort level  Description: Interventions:  - Encourage patient to monitor pain and request assistance  - Assess pain using appropriate pain scale  - Administer analgesics based on type and severity of pain and evaluate response  - Implement non-pharmacological measures as appropriate and evaluate response  - Consider cultural and social influences on pain and pain management  - Notify physician/advanced practitioner if interventions unsuccessful or patient reports new pain  Outcome: Progressing     Problem: INFECTION - ADULT  Goal: Absence or prevention of progression during hospitalization  Description: INTERVENTIONS:  - Assess and monitor for signs and symptoms of infection  - Monitor lab/diagnostic results  - Monitor all insertion sites, i.e. indwelling lines, tubes, and drains  - Monitor endotracheal if appropriate and nasal secretions for changes in amount and color  - [place] appropriate cooling/warming therapies per order  - Administer medications as ordered  - Instruct and encourage patient and family to use good hand hygiene technique  - Identify and instruct in appropriate isolation precautions for identified infection/condition  Outcome: Progressing  Goal: Absence of fever/infection during neutropenic period  Description: INTERVENTIONS:  - Monitor WBC    Outcome: Progressing     Problem: SAFETY ADULT  Goal: Patient will remain free of falls  Description: INTERVENTIONS:  - Educate patient/family on patient safety including physical limitations  - Instruct patient to call for assistance with activity   - Consult OT/PT to assist with strengthening/mobility   - Keep Call bell within reach  - Keep bed low and locked with side rails adjusted as appropriate  - Keep care items and personal belongings within reach  - Initiate and maintain comfort rounds  - Make Fall Risk Sign visible to staff  - Offer Toileting every 2 Hours,  in advance of need  - Initiate/Maintain bed alarm  - Obtain necessary fall risk management equipment:   - Apply yellow socks and bracelet for high fall risk patients  - Consider moving patient to room near nurses station  Outcome: Progressing

## 2024-08-21 PROBLEM — I48.0 PAROXYSMAL ATRIAL FIBRILLATION (HCC): Status: ACTIVE | Noted: 2024-08-21

## 2024-08-21 PROBLEM — R78.81 BACTEREMIA: Status: ACTIVE | Noted: 2024-08-21

## 2024-08-21 PROBLEM — R79.89 POSITIVE D DIMER: Status: ACTIVE | Noted: 2024-08-21

## 2024-08-21 LAB
ALBUMIN SERPL BCG-MCNC: 3.5 G/DL (ref 3.5–5)
ALP SERPL-CCNC: 37 U/L (ref 34–104)
ALT SERPL W P-5'-P-CCNC: 13 U/L (ref 7–52)
ANION GAP SERPL CALCULATED.3IONS-SCNC: 5 MMOL/L (ref 4–13)
AST SERPL W P-5'-P-CCNC: 15 U/L (ref 13–39)
BACTERIA UR QL AUTO: ABNORMAL /HPF
BASOPHILS # BLD AUTO: 0.01 THOUSANDS/ÂΜL (ref 0–0.1)
BASOPHILS NFR BLD AUTO: 0 % (ref 0–1)
BILIRUB SERPL-MCNC: 0.7 MG/DL (ref 0.2–1)
BILIRUB UR QL STRIP: NEGATIVE
BUN SERPL-MCNC: 23 MG/DL (ref 5–25)
CALCIUM SERPL-MCNC: 8.2 MG/DL (ref 8.4–10.2)
CHLORIDE SERPL-SCNC: 112 MMOL/L (ref 96–108)
CLARITY UR: CLEAR
CO2 SERPL-SCNC: 24 MMOL/L (ref 21–32)
COLOR UR: YELLOW
CREAT SERPL-MCNC: 0.97 MG/DL (ref 0.6–1.3)
CRP SERPL QL: 98.1 MG/L
EOSINOPHIL # BLD AUTO: 0 THOUSAND/ÂΜL (ref 0–0.61)
EOSINOPHIL NFR BLD AUTO: 0 % (ref 0–6)
ERYTHROCYTE [DISTWIDTH] IN BLOOD BY AUTOMATED COUNT: 13 % (ref 11.6–15.1)
GFR SERPL CREATININE-BSD FRML MDRD: 69 ML/MIN/1.73SQ M
GLUCOSE SERPL-MCNC: 82 MG/DL (ref 65–140)
GLUCOSE UR STRIP-MCNC: ABNORMAL MG/DL
HCT VFR BLD AUTO: 39.5 % (ref 36.5–49.3)
HGB BLD-MCNC: 12.7 G/DL (ref 12–17)
HGB UR QL STRIP.AUTO: ABNORMAL
IMM GRANULOCYTES # BLD AUTO: 0.01 THOUSAND/UL (ref 0–0.2)
IMM GRANULOCYTES NFR BLD AUTO: 0 % (ref 0–2)
KETONES UR STRIP-MCNC: ABNORMAL MG/DL
LEUKOCYTE ESTERASE UR QL STRIP: NEGATIVE
LYMPHOCYTES # BLD AUTO: 0.36 THOUSANDS/ÂΜL (ref 0.6–4.47)
LYMPHOCYTES NFR BLD AUTO: 7 % (ref 14–44)
MAGNESIUM SERPL-MCNC: 2.1 MG/DL (ref 1.9–2.7)
MCH RBC QN AUTO: 29.8 PG (ref 26.8–34.3)
MCHC RBC AUTO-ENTMCNC: 32.2 G/DL (ref 31.4–37.4)
MCV RBC AUTO: 93 FL (ref 82–98)
MONOCYTES # BLD AUTO: 0.53 THOUSAND/ÂΜL (ref 0.17–1.22)
MONOCYTES NFR BLD AUTO: 11 % (ref 4–12)
MUCOUS THREADS UR QL AUTO: ABNORMAL
NEUTROPHILS # BLD AUTO: 3.95 THOUSANDS/ÂΜL (ref 1.85–7.62)
NEUTS SEG NFR BLD AUTO: 82 % (ref 43–75)
NITRITE UR QL STRIP: NEGATIVE
NON-SQ EPI CELLS URNS QL MICRO: ABNORMAL /HPF
NRBC BLD AUTO-RTO: 0 /100 WBCS
PH UR STRIP.AUTO: 6 [PH]
PLATELET # BLD AUTO: 112 THOUSANDS/UL (ref 149–390)
PMV BLD AUTO: 12.1 FL (ref 8.9–12.7)
POTASSIUM SERPL-SCNC: 4.6 MMOL/L (ref 3.5–5.3)
PROCALCITONIN SERPL-MCNC: 0.09 NG/ML
PROT SERPL-MCNC: 5.5 G/DL (ref 6.4–8.4)
PROT UR STRIP-MCNC: ABNORMAL MG/DL
RBC # BLD AUTO: 4.26 MILLION/UL (ref 3.88–5.62)
RBC #/AREA URNS AUTO: ABNORMAL /HPF
SODIUM SERPL-SCNC: 141 MMOL/L (ref 135–147)
SP GR UR STRIP.AUTO: >=1.03 (ref 1–1.03)
UROBILINOGEN UR STRIP-ACNC: 2 MG/DL
WBC # BLD AUTO: 4.86 THOUSAND/UL (ref 4.31–10.16)
WBC #/AREA URNS AUTO: ABNORMAL /HPF

## 2024-08-21 PROCEDURE — 83735 ASSAY OF MAGNESIUM: CPT

## 2024-08-21 PROCEDURE — 85025 COMPLETE CBC W/AUTO DIFF WBC: CPT

## 2024-08-21 PROCEDURE — 81001 URINALYSIS AUTO W/SCOPE: CPT

## 2024-08-21 PROCEDURE — 97163 PT EVAL HIGH COMPLEX 45 MIN: CPT

## 2024-08-21 PROCEDURE — 87040 BLOOD CULTURE FOR BACTERIA: CPT

## 2024-08-21 PROCEDURE — 86140 C-REACTIVE PROTEIN: CPT

## 2024-08-21 PROCEDURE — 84145 PROCALCITONIN (PCT): CPT

## 2024-08-21 PROCEDURE — 80053 COMPREHEN METABOLIC PANEL: CPT

## 2024-08-21 PROCEDURE — 97167 OT EVAL HIGH COMPLEX 60 MIN: CPT

## 2024-08-21 PROCEDURE — 99232 SBSQ HOSP IP/OBS MODERATE 35: CPT | Performed by: INTERNAL MEDICINE

## 2024-08-21 PROCEDURE — 97110 THERAPEUTIC EXERCISES: CPT

## 2024-08-21 RX ORDER — CEFTRIAXONE 1 G/50ML
1000 INJECTION, SOLUTION INTRAVENOUS EVERY 24 HOURS
Status: DISCONTINUED | OUTPATIENT
Start: 2024-08-21 | End: 2024-08-23

## 2024-08-21 RX ORDER — SODIUM CHLORIDE, SODIUM LACTATE, POTASSIUM CHLORIDE, CALCIUM CHLORIDE 600; 310; 30; 20 MG/100ML; MG/100ML; MG/100ML; MG/100ML
50 INJECTION, SOLUTION INTRAVENOUS CONTINUOUS
Status: DISCONTINUED | OUTPATIENT
Start: 2024-08-21 | End: 2024-08-21

## 2024-08-21 RX ORDER — LISINOPRIL 2.5 MG/1
2.5 TABLET ORAL DAILY
Status: DISCONTINUED | OUTPATIENT
Start: 2024-08-21 | End: 2024-08-24 | Stop reason: HOSPADM

## 2024-08-21 RX ORDER — GUAIFENESIN 100 MG/5ML
200 SOLUTION ORAL EVERY 4 HOURS PRN
Status: DISCONTINUED | OUTPATIENT
Start: 2024-08-21 | End: 2024-08-24 | Stop reason: HOSPADM

## 2024-08-21 RX ADMIN — TICAGRELOR 60 MG: 60 TABLET ORAL at 09:09

## 2024-08-21 RX ADMIN — ROPINIROLE 1 MG: 1 TABLET, FILM COATED ORAL at 20:22

## 2024-08-21 RX ADMIN — REMDESIVIR 100 MG: 100 INJECTION, POWDER, LYOPHILIZED, FOR SOLUTION INTRAVENOUS at 16:34

## 2024-08-21 RX ADMIN — GUAIFENESIN 600 MG: 600 TABLET, EXTENDED RELEASE ORAL at 09:08

## 2024-08-21 RX ADMIN — Medication 1 SPRAY: at 12:18

## 2024-08-21 RX ADMIN — ENOXAPARIN SODIUM 40 MG: 40 INJECTION SUBCUTANEOUS at 09:08

## 2024-08-21 RX ADMIN — SODIUM CHLORIDE, SODIUM LACTATE, POTASSIUM CHLORIDE, AND CALCIUM CHLORIDE 50 ML/HR: .6; .31; .03; .02 INJECTION, SOLUTION INTRAVENOUS at 12:17

## 2024-08-21 RX ADMIN — ACETAMINOPHEN 650 MG: 325 TABLET ORAL at 16:37

## 2024-08-21 RX ADMIN — ROPINIROLE 1 MG: 1 TABLET, FILM COATED ORAL at 16:34

## 2024-08-21 RX ADMIN — LISINOPRIL 2.5 MG: 2.5 TABLET ORAL at 20:51

## 2024-08-21 RX ADMIN — LEVOTHYROXINE SODIUM 100 MCG: 100 TABLET ORAL at 05:21

## 2024-08-21 RX ADMIN — GUAIFENESIN 600 MG: 600 TABLET, EXTENDED RELEASE ORAL at 20:22

## 2024-08-21 RX ADMIN — ROPINIROLE 1 MG: 1 TABLET, FILM COATED ORAL at 09:08

## 2024-08-21 RX ADMIN — CEFTRIAXONE 1000 MG: 1 INJECTION, SOLUTION INTRAVENOUS at 20:21

## 2024-08-21 RX ADMIN — TICAGRELOR 60 MG: 60 TABLET ORAL at 20:22

## 2024-08-21 NOTE — UTILIZATION REVIEW
Initial Clinical Review    Admission: Date/Time/Statement:   Admission Orders (From admission, onward)       Ordered        08/20/24 1304  INPATIENT ADMISSION  Once                          Orders Placed This Encounter   Procedures    INPATIENT ADMISSION     Standing Status:   Standing     Number of Occurrences:   1     Order Specific Question:   Level of Care     Answer:   Med Surg [16]     Order Specific Question:   Estimated length of stay     Answer:   More than 2 Midnights     Order Specific Question:   Certification     Answer:   I certify that inpatient services are medically necessary for this patient for a duration of greater than two midnights. See H&P and MD Progress Notes for additional information about the patient's course of treatment.     ED Arrival Information       Expected   -    Arrival   8/20/2024 10:16    Acuity   Urgent              Means of arrival   Ambulance    Escorted by   [facility]    Service   Family Medicine    Admission type   Emergency              Arrival complaint   abdominal pain             Chief Complaint   Patient presents with    Abdominal Pain    Back Pain     Pt fell around 1AM last night and came in to the ED this AM. Pt here for abdominal pain, back pain, rigidity. Family states he wasn't able to get up or walk. Before arrival pt had a fever of 101       Initial Presentation:   88 yom to ER from home via EMS s/p fall, c/o headache, rhinorrhea and cough, abd & back pain. Patient states that he was ambulating to the bathroom with his walker when he fell on the tile and hit his head. No LOC. Hx Parkinson' with resting tremors, hypothyroidism, CAD, hypertension, common iliac aneurysm, PE, CVA.  Admission CT chest, a/p neg. Labs: COVID+, , , Cl 109, BUN 27 , tprot 6.1d-dimer 1.01, PT 15.2, iron sat 11, iron 30, CRP 66.5, u/a+gluc, ketones, blood, prot, urobilinogen.  Admitted to inpatient status for COVID. Started on IV Remdesivir, IVF, cultures  pending.    Anticipated Length of Stay/Certification Statement:   Patient will be admitted on an Inpatient basis with an anticipated length of stay of  < than 2 midnights. Justification for Hospital Stay: Covid, Weakness    Date: 8/21/24   Day 2:   Remains on COVID tx pathway with IV Remdesivir. Blood culture showed Gram positive cocci in pairs, chains and clusters. ID panel shows streptococcus. Repeat blood culture ordered. Start IV Ceftriaxone. Continue to monitor respiratory status, O2 needs, VS, follow labs/cultures.    Date: 8/22/24  Day 3: Has surpassed a 2nd midnight with active treatments and services.  Dx: COVID, +blood cultures. IVABT & IV Remdesivir in progress. Results pending on repeat blood cultures. Lungs clear, currently on RA. Continue to monitor respiratory status, O2 needs, VS, follow labs & cultures.      ED Triage Vitals   Temperature Pulse Respirations Blood Pressure SpO2 Pain Score   08/20/24 1033 08/20/24 1034 08/20/24 1034 08/20/24 1034 08/20/24 1034 08/20/24 1034   99.7 °F (37.6 °C) 64 19 152/77 97 % 5     Weight (last 2 days)       Date/Time Weight    08/22/24 0600 80.4 (177.14)    08/21/24 0555 79.8 (176)    08/20/24 1537 80.7 (177.98)    08/20/24 14:08:57 80.7 (178)            Vital Signs (last 3 days)       Date/Time Temp Pulse Resp BP MAP (mmHg) SpO2 O2 Device Patient Position - Orthostatic VS Rakesh Coma Scale Score Pain    08/22/24 1040 -- -- -- -- -- -- -- -- -- No Pain    08/22/24 10:35:23 -- 83 -- 117/63 81 91 % -- -- -- --    08/22/24 09:17:33 97.3 °F (36.3 °C) 99 22 121/79 93 93 % None (Room air) Lying -- No Pain    08/22/24 0707 -- 108 -- 143/70 94 90 % -- -- -- --    08/22/24 0637 -- 89 -- 111/67 82 92 % -- -- -- --    08/22/24 06:08:03 -- 116 -- 153/73 100 89 % -- -- -- --    08/22/24 0607 -- 92 -- 153/73 100 89 % -- -- -- --    08/22/24 0551 -- -- -- -- -- -- -- -- -- Med Not Given for Pain - for MAR use only    08/22/24 0549 -- 62 -- 141/71 94 90 % -- -- -- --    08/22/24  0545 -- -- -- 154/123 133 -- -- -- -- --    08/22/24 0543 99.5 °F (37.5 °C) 91 -- -- -- 89 % -- -- -- --    08/22/24 01:23:03 -- -- -- 162/80 107 -- -- -- -- --    08/21/24 23:02:25 98.4 °F (36.9 °C) 65 18 166/84 111 93 % -- -- -- --    08/21/24 2100 -- -- -- -- -- -- None (Room air) -- 15 No Pain    08/21/24 2030 -- 64 -- 174/79 111 95 % -- -- -- --    08/21/24 18:56:34 -- 70 -- 165/73 104 91 % -- -- -- --    08/21/24 18:55:24 99.7 °F (37.6 °C) 70 -- 167/123 138 95 % -- -- -- --    08/21/24 15:39:31 100 °F (37.8 °C) 64 -- 174/80 111 95 % -- -- -- --    08/21/24 0955 -- -- -- -- -- -- -- -- 15 No Pain    08/21/24 07:58:02 98.2 °F (36.8 °C) 78 18 184/93 123 95 % -- -- -- --    08/21/24 07:57:13 98.2 °F (36.8 °C) 71 18 184/93 123 96 % -- -- -- --    08/21/24 02:56:11 -- 67 -- 139/67 91 94 % -- -- -- --    08/21/24 02:37:49 99 °F (37.2 °C) 63 14 -- 120 93 % -- -- -- --    08/20/24 21:39:54 99.3 °F (37.4 °C) 56 18 129/98 108 97 % -- -- -- --    08/20/24 2100 -- -- -- -- -- -- None (Room air) -- 15 No Pain    08/20/24 18:32:42 99.1 °F (37.3 °C) 61 18 138/87 104 96 % -- -- -- --    08/20/24 1600 -- -- -- -- -- -- -- -- 15 --    08/20/24 15:24:16 98.9 °F (37.2 °C) 58 18 115/84 94 94 % -- -- -- --    08/20/24 14:08:57 97.9 °F (36.6 °C) 60 18 116/80 92 95 % None (Room air) -- -- --    08/20/24 1405 -- -- -- -- -- -- -- -- -- No Pain    08/20/24 1330 -- 56 20 174/75 -- 94 % -- -- -- --    08/20/24 1309 98.7 °F (37.1 °C) 62 20 190/81 -- 93 % -- Lying -- --    08/20/24 1300 -- 58 -- -- -- 94 % -- -- -- --    08/20/24 1218 -- -- -- -- -- 97 % -- -- 14 --    08/20/24 1042 -- -- -- -- -- -- -- -- 14 5 08/20/24 1041 -- -- -- -- -- -- None (Room air) -- -- --    08/20/24 1036 -- -- -- -- -- 97 % -- -- 15 --    08/20/24 1034 99.7 °F (37.6 °C) 64 19 152/77 -- 97 % None (Room air) -- -- 5    08/20/24 1033 99.7 °F (37.6 °C) -- -- -- -- -- -- -- -- --              Pertinent Labs/Diagnostic Test Results:   Radiology:  CT chest abdomen  pelvis w contrast   Final Interpretation by Arthur Woodruff MD (08/20 1247)      CT chest:      No acute thoracic process.      No fracture.      Chronic findings and negatives as above.      CT abdomen and pelvis:      No evidence of acute abdominopelvic process.      No fracture.      Atherosclerotic disease. 2.4 cm right common iliac artery aneurysm.      1.5 cm left renal hyperdense nodule likely a hemorrhagic or proteinaceous cyst. This could be followed up with renal ultrasound in 6 months.      Additional chronic findings and negatives as above.                     Workstation performed: [id number]           Cardiology:  ECG 12 lead   Final Result by Matthias Gonzalez MD (08/20 1123)   Normal sinus rhythm   Possible Left atrial enlargement   Incomplete right bundle branch block   Left anterior fascicular block   Left ventricular hypertrophy ( R in aVL , Rainer product )   Abnormal ECG   When compared with ECG of 20-AUG-2024 01:00,   Premature atrial complexes are no longer Present   Confirmed by Matthias Gonzalez ([id number]) on 8/20/2024 11:23:28 AM          Results from last 7 days   Lab Units 08/20/24  1103   SARS-COV-2  Positive*     Results from last 7 days   Lab Units 08/22/24  0547 08/21/24  0528 08/20/24  1103 08/20/24  0107   WBC Thousand/uL 5.53 4.86 5.11 7.28   HEMOGLOBIN g/dL 12.8 12.7 12.9 13.2   HEMATOCRIT % 39.8 39.5 40.4 41.0   PLATELETS Thousands/uL 96* 112* 119* 131*   TOTAL NEUT ABS Thousands/µL 4.94 3.95 4.42  --        Results from last 7 days   Lab Units 08/22/24  0547 08/21/24  0528 08/20/24  1103 08/20/24  0107   SODIUM mmol/L 137 141 139 140   POTASSIUM mmol/L 3.9 4.6 3.8 4.2   CHLORIDE mmol/L 107 112* 108 109*   CO2 mmol/L 22 24 27 25   ANION GAP mmol/L 8 5 4 6   BUN mg/dL 19 23 24 27*   CREATININE mg/dL 0.77 0.97 1.00 1.12   EGFR ml/min/1.73sq m 81 69 66 58   CALCIUM mg/dL 8.0* 8.2* 8.6 8.8   MAGNESIUM mg/dL 1.8* 2.1 2.1  --      Results from last 7 days   Lab Units 08/22/24  0570  08/21/24  0528 08/20/24  1103   AST U/L 16 15 16   ALT U/L 15 13 14   ALK PHOS U/L 33* 37 40   TOTAL PROTEIN g/dL 5.5* 5.5* 6.1*   ALBUMIN g/dL 3.3* 3.5 3.7   TOTAL BILIRUBIN mg/dL 0.86 0.70 0.92     Results from last 7 days   Lab Units 08/20/24  0100   POC GLUCOSE mg/dl 121     Results from last 7 days   Lab Units 08/22/24  0547 08/21/24  0528 08/20/24  1103 08/20/24  0107   GLUCOSE RANDOM mg/dL 137 82 90 121     Results from last 7 days   Lab Units 08/20/24  0107   CK TOTAL U/L 61       Results from last 7 days   Lab Units 08/20/24  2143   D-DIMER QUANTITATIVE ug/ml FEU 1.01*     Results from last 7 days   Lab Units 08/20/24  1103   PROTIME seconds 15.2*   INR  1.15   PTT seconds 30     Results from last 7 days   Lab Units 08/20/24  1103   TSH 3RD GENERATON uIU/mL 0.293*     Results from last 7 days   Lab Units 08/21/24  0528 08/20/24  1103   PROCALCITONIN ng/ml 0.09 0.12     Results from last 7 days   Lab Units 08/20/24  1103   LACTIC ACID mmol/L 0.8     Results from last 7 days   Lab Units 08/20/24  1103   FERRITIN ng/mL 166   IRON SATURATION % 11*   IRON ug/dL 30*   TIBC ug/dL 271     Results from last 7 days   Lab Units 08/21/24  0528 08/20/24  2143   CRP mg/L 98.1* 66.5*       Results from last 7 days   Lab Units 08/21/24  0432   CLARITY UA  Clear   COLOR UA  Yellow   SPEC GRAV UA  >=1.030   PH UA  6.0   GLUCOSE UA mg/dl 30 (3/100%)*   KETONES UA mg/dl 10 (1+)*   BLOOD UA  Small*   PROTEIN UA mg/dl 30 (1+)*   NITRITE UA  Negative   BILIRUBIN UA  Negative   UROBILINOGEN UA (BE) mg/dl 2.0*   LEUKOCYTES UA  Negative   WBC UA /hpf 0-1   RBC UA /hpf 1-2   BACTERIA UA /hpf Occasional   EPITHELIAL CELLS WET PREP /hpf None Seen   MUCUS THREADS  Moderate*     Results from last 7 days   Lab Units 08/20/24  1103   INFLUENZA A PCR  Negative   INFLUENZA B PCR  Negative   RSV PCR  Negative     Results from last 7 days   Lab Units 08/21/24  1857 08/20/24  1103   BLOOD CULTURE  Received in Microbiology Lab. Culture in  Progress. Alpha Hemolytic Streptococcus NOT Enterococcus, NOT S. pneumoniae*   GRAM STAIN RESULT   --  Gram positive cocci in pairs, chains and clusters*       ED Treatment-Medication Administration from 08/20/2024 1016 to 08/20/2024 1348         Date/Time Order Dose Route Action     08/20/2024 1133 acetaminophen (Ofirmev) injection 1,000 mg 1,000 mg Intravenous New Bag     08/20/2024 1107 sodium chloride 0.9 % bolus 500 mL 500 mL Intravenous New Bag     08/20/2024 1119 iohexol (OMNIPAQUE) 350 MG/ML injection (MULTI-DOSE) 100 mL 100 mL Intravenous Given            Past Medical History:   Diagnosis Date    AA (aortic aneurysm) ([facility])     Coronary artery disease     CVA (cerebral vascular accident) ([facility])     Parkinsons     Pulmonary emboli ([facility])        Admitting Diagnosis: Abdominal pain [R10.9]  Generalized weakness [R53.1]  Ambulatory dysfunction [R26.2]  COVID-19 [U07.1]  Age/Sex: 88 y.o. male  Admission Orders:  Scd/foot pumps  Pt/ot eval & tx  Contact & airborne isolation    Scheduled Medications:  Medications 08/13 08/14 08/15 08/16 08/17 08/18 08/19 08/20 08/21 08/22   acetaminophen (Ofirmev) injection 1,000 mg  Dose: 1,000 mg  Freq: Once Route: IV  Last Dose: Stopped (08/20/24 1215)  Start: 08/20/24 1045 End: 08/20/24 1215   Admin Instructions:              1133     1215          cefTRIAXone (ROCEPHIN) IVPB (premix in dextrose) 1,000 mg 50 mL  Dose: 1,000 mg  Freq: Every 24 hours Route: IV  Last Dose: 1,000 mg (08/21/24 2021)  Start: 08/21/24 1945   Admin Instructions:      Order specific questions:               2021 1945        enoxaparin (LOVENOX) subcutaneous injection 40 mg  Dose: 40 mg  Freq: Daily Route: SC  Start: 08/20/24 1545   Admin Instructions:              1659      0908      0907        guaiFENesin (MUCINEX) 12 hr tablet 600 mg  Dose: 600 mg  Freq: Every 12 hours scheduled Route: PO  Start: 08/20/24 2100   Admin Instructions:              2121 0908 2022 0913 2100         levothyroxine tablet 100 mcg  Dose: 100 mcg  Freq: Daily (early morning) Route: PO  Start: 08/21/24 0600   Admin Instructions:               0521      0531        levothyroxine tablet 125 mcg  Dose: 125 mcg  Freq: Daily Route: PO  Start: 08/20/24 1545 End: 08/20/24 1803   Admin Instructions:              1658     1803-D/C'd        lisinopril (ZESTRIL) tablet 2.5 mg  Dose: 2.5 mg  Freq: Daily Route: PO  Start: 08/21/24 2045   Admin Instructions:      Order specific questions:               2051 0907        magnesium sulfate 2 g/50 mL IVPB (premix) 2 g  Dose: 2 g  Freq: Once Route: IV  Last Dose: 2 g (08/22/24 0907)  Start: 08/22/24 0900 End: 08/22/24 1107   Admin Instructions:                0907        potassium chloride (Klor-Con M20) CR tablet 20 mEq  Dose: 20 mEq  Freq: Once Route: PO  Start: 08/20/24 1345 End: 08/20/24 1548   Admin Instructions:              1548           remdesivir (Veklury) 200 mg in sodium chloride 0.9 % 290 mL IVPB  Dose: 200 mg  Freq: Every 24 hours Route: IV  Indications of Use: INFECTION CAUSED BY 2019 NOVEL CORONAVIRUS  Last Dose: 200 mg (08/20/24 1658)  Start: 08/20/24 1630 End: 08/20/24 1728   Admin Instructions:      Order specific questions:              1658          Followed by   remdesivir (Veklury) 100 mg in sodium chloride 0.9 % 270 mL IVPB  Dose: 100 mg  Freq: Every 24 hours Route: IV  Indications of Use: INFECTION CAUSED BY 2019 NOVEL CORONAVIRUS  Start: 08/21/24 1630 End: 08/23/24 1629   Admin Instructions:      Order specific questions:               1634      1630        rOPINIRole (REQUIP) tablet 1 mg  Dose: 1 mg  Freq: 3 times daily Route: PO  Start: 08/20/24 1600           1659     2122      0908     1634     2022      0907     1600     2100        sodium chloride 0.9 % bolus 500 mL  Dose: 500 mL  Freq: Once Route: IV  Last Dose: Stopped (08/20/24 1322)  Start: 08/20/24 1045 End: 08/20/24 1322           1107     1322          ticagrelor tablet 60 mg  Dose: 60  mg  Freq: Every 12 hours scheduled Route: PO  Start: 08/20/24 2100           2122      0909     2022      0912     2100        vancomycin (VANCOCIN) 1,250 mg in sodium chloride 0.9 % 250 mL IVPB  Dose: 15 mg/kg  Weight Dosing Info: 79.8 kg  Freq: Every 12 hours Route: IV  Start: 08/21/24 0715 End: 08/21/24 0717   Admin Instructions:      Order specific questions:               0717-D/C'd  (0910)         vancomycin (VANCOCIN) 2,000 mg in sodium chloride 0.9 % 500 mL IVPB  Dose: 25 mg/kg  Weight Dosing Info: 79.8 kg  Freq: Once Route: IV  Start: 08/21/24 1200 End: 08/21/24 1138   Admin Instructions:      Order specific questions:               1138-D/C'd                   Continuous Meds Sorted by Name  for Pavan Edward as of 08/13/24 through 8/22/24  Legend:       Medications 08/13 08/14 08/15 08/16 08/17 08/18 08/19 08/20 08/21 08/22   lactated ringers infusion  Rate: 50 mL/hr Dose: 50 mL/hr  Freq: Continuous Route: IV  Indications of Use: IV Hydration  Last Dose: 50 mL/hr (08/21/24 1217)  Start: 08/21/24 1015 End: 08/21/24 1624            1217     1624-D/C'd       sodium chloride 0.9 % infusion  Rate: 50 mL/hr Dose: 50 mL/hr  Freq: Continuous Route: IV  Last Dose: 50 mL/hr (08/20/24 1844)  Start: 08/20/24 1815 End: 08/21/24 1014           1844      1014-D/C'd       Legend:       [id number]/1708/1808/1908/2008/2108/22        PRN Meds Sorted by Name  for Pavan Edward as of 08/13/24 through 8/22/24  Legend:       Medications 08/13 08/14 08/15 08/16 08/17 08/18 08/19 08/20 08/21 08/22   acetaminophen (TYLENOL) tablet 650 mg  Dose: 650 mg  Freq: Every 6 hours PRN Route: PO  PRN Reasons: mild pain,fever,headaches  Indications of Use: FEVER,HEADACHE,MILD PAIN  Start: 08/20/24 1534            1637      0551        benzonatate (TESSALON PERLES) capsule 100 mg  Dose: 100 mg  Freq: 3 times daily PRN Route: PO  PRN Reason: cough  Start: 08/20/24 1800 End: 08/21/24 0835   Admin Instructions:                0835-D/C'd       guaiFENesin (ROBITUSSIN) oral liquid 200 mg  Dose: 200 mg  Freq: Every 4 hours PRN Route: PO  PRN Reason: cough  Start: 08/21/24 0900   Admin Instructions:                0551        iohexol (OMNIPAQUE) 350 MG/ML injection (MULTI-DOSE) 100 mL  Dose: 100 mL  Freq: Once in imaging Route: IV  PRN Reason: contrast  Start: 08/20/24 1119 End: 08/20/24 1119           1119          phenol (CHLORASEPTIC) 1.4 % mucosal liquid 1 spray  Dose: 1 spray  Freq: Every 2 hour PRN Route: MT  PRN Reason: sore throat  Start: 08/21/24 1108   Admin Instructions:               1218         polyethylene glycol (MIRALAX) packet 17 g  Dose: 17 g  Freq: Daily PRN Route: PO  PRN Reason: constipation  Start: 08/20/24 1534   Admin Instructions:                                 Network Utilization Review Department  ATTENTION: Please call with any questions or concerns to [phone] and carefully listen to the prompts so that you are directed to the right person. All voicemails are confidential.   For Discharge needs, contact Care Management DC Support Team at [phone] opt. 2  Send all requests for admission clinical reviews, approved or denied determinations and any other requests to dedicated fax number below belonging to the campus where the patient is receiving treatment. List of dedicated fax numbers for the Facilities:  FACILITY NAME UR FAX NUMBER   ADMISSION DENIALS (Administrative/Medical Necessity) [phone]   DISCHARGE SUPPORT TEAM (NETWORK) [phone]   PARENT CHILD HEALTH (Maternity/NICU/Pediatrics) [phone]   [facility] [phone]   [facility] [phone]   [facility] [phone]   [facility] [phone]   [facility] [phone]   [facility] [phone]   [facility] [phone]   [facility] [phone]   [facility] [phone]   [facility] [phone]   [facility] [phone]   [facility] [phone]

## 2024-08-21 NOTE — PLAN OF CARE
Problem: PAIN - ADULT  Goal: Verbalizes/displays adequate comfort level or baseline comfort level  Description: Interventions:  - Encourage patient to monitor pain and request assistance  - Assess pain using appropriate pain scale  - Administer analgesics based on type and severity of pain and evaluate response  - Implement non-pharmacological measures as appropriate and evaluate response  - Consider cultural and social influences on pain and pain management  - Notify physician/advanced practitioner if interventions unsuccessful or patient reports new pain  Outcome: Progressing     Problem: INFECTION - ADULT  Goal: Absence or prevention of progression during hospitalization  Description: INTERVENTIONS:  - Assess and monitor for signs and symptoms of infection  - Monitor lab/diagnostic results  - Monitor all insertion sites, i.e. indwelling lines, tubes, and drains  - Monitor endotracheal if appropriate and nasal secretions for changes in amount and color  - [place] appropriate cooling/warming therapies per order  - Administer medications as ordered  - Instruct and encourage patient and family to use good hand hygiene technique  - Identify and instruct in appropriate isolation precautions for identified infection/condition  Outcome: Progressing     Problem: SAFETY ADULT  Goal: Patient will remain free of falls  Description: INTERVENTIONS:  - Educate patient/family on patient safety including physical limitations  - Instruct patient to call for assistance with activity   - Consult OT/PT to assist with strengthening/mobility   - Keep Call bell within reach  - Keep bed low and locked with side rails adjusted as appropriate  - Keep care items and personal belongings within reach  - Initiate and maintain comfort rounds  - Make Fall Risk Sign visible to staff  - Offer Toileting every 2 Hours, in advance of need  - Initiate/Maintain Chair/Bedalarm  - Obtain necessary fall risk management equipment: walker  - Apply  yellow socks and bracelet for high fall risk patients  - Consider moving patient to room near nurses station  Outcome: Progressing

## 2024-08-21 NOTE — PLAN OF CARE
Problem: PAIN - ADULT  Goal: Verbalizes/displays adequate comfort level or baseline comfort level  Description: Interventions:  - Encourage patient to monitor pain and request assistance  - Assess pain using appropriate pain scale  - Administer analgesics based on type and severity of pain and evaluate response  - Implement non-pharmacological measures as appropriate and evaluate response  - Consider cultural and social influences on pain and pain management  - Notify physician/advanced practitioner if interventions unsuccessful or patient reports new pain  Outcome: Progressing     Problem: INFECTION - ADULT  Goal: Absence or prevention of progression during hospitalization  Description: INTERVENTIONS:  - Assess and monitor for signs and symptoms of infection  - Monitor lab/diagnostic results  - Monitor all insertion sites, i.e. indwelling lines, tubes, and drains  - Monitor endotracheal if appropriate and nasal secretions for changes in amount and color  - [place] appropriate cooling/warming therapies per order  - Administer medications as ordered  - Instruct and encourage patient and family to use good hand hygiene technique  - Identify and instruct in appropriate isolation precautions for identified infection/condition  Outcome: Progressing     Problem: SAFETY ADULT  Goal: Patient will remain free of falls  Description: INTERVENTIONS:  - Educate patient/family on patient safety including physical limitations  - Instruct patient to call for assistance with activity   - Consult OT/PT to assist with strengthening/mobility   - Keep Call bell within reach  - Keep bed low and locked with side rails adjusted as appropriate  - Keep care items and personal belongings within reach  - Initiate and maintain comfort rounds  - Make Fall Risk Sign visible to staff  - Offer Toileting every 2 Hours, in advance of need  - Initiate/Maintain bEDalarm  - Obtain necessary fall risk management equipmen kendra  - Apply yellow socks  and bracelet for high fall risk patients  - Consider moving patient to room near nurses station  Outcome: Progressing     Problem: RESPIRATORY - ADULT  Goal: Achieves optimal ventilation and oxygenation  Description: INTERVENTIONS:  - Assess for changes in respiratory status  - Assess for changes in mentation and behavior  - Position to facilitate oxygenation and minimize respiratory effort  - Oxygen administered by appropriate delivery if ordered  - Initiate smoking cessation education as indicated  - Encourage broncho-pulmonary hygiene including cough, deep breathe, Incentive Spirometry  - Assess the need for suctioning and aspirate as needed  - Assess and instruct to report SOB or any respiratory difficulty  - Respiratory Therapy support as indicated  Outcome: Progressing     Problem: Prexisting or High Potential for Compromised Skin Integrity  Goal: Skin integrity is maintained or improved  Description: INTERVENTIONS:  - Identify patients at risk for skin breakdown  - Assess and monitor skin integrity  - Assess and monitor nutrition and hydration status  - Monitor labs   - Assess for incontinence   - Turn and reposition patient  - Assist with mobility/ambulation  - Relieve pressure over bony prominences  - Avoid friction and shearing  - Provide appropriate hygiene as needed including keeping skin clean and dry  - Evaluate need for skin moisturizer/barrier cream  - Collaborate with interdisciplinary team   - Patient/family teaching  - Consider wound care consult   Outcome: Progressing

## 2024-08-21 NOTE — PHYSICAL THERAPY NOTE
PHYSICAL THERAPY EVALUATION/TREATMENT     08/21/24 1355   PT Last Visit   PT Visit Date 08/21/24   Note Type   Note type Screen   Pain Assessment   Pain Assessment Tool Levin-Baker FACES   Levin-Baker FACES Pain Rating 4  (Abdominal area)   Restrictions/Precautions   Other Precautions Chair Alarm;Bed Alarm;Contact/isolation;Airborne/isolation;Fall Risk;Pain   Home Living   Type of Home Apartment   Home Layout One level;Elevator   Bathroom Equipment Shower chair;Commode   Home Equipment Walker   Additional Comments Patient states using a roller walker prior to admission   Prior Function   Level of [place] Independent with ADLs;Independent with functional mobility;Needs assistance with IADLS   Lives With Spouse   Receives Help From Family  (Spouse completing cooking and cleaning)   Falls in the last 6 months 1 to 4   Comments Patient with a fall prior to admission   General   Additional Pertinent History Chart reviewed, patient admitted with COVID, generalized weakness and resulting gait dysfunction.  Patient now requiring increased assist to maintain sitting balance as well as complete transfers and initiation of gait.  Patient is typically independent with a rolling walker prior to admission   Family/Caregiver Present No   Cognition   Overall Cognitive Status WFL   Arousal/Participation Cooperative   Attention Attends with cues to redirect   Orientation Level Oriented to person;Oriented to place;Oriented to situation   Following Commands Follows one step commands with increased time or repetition   Comments Patient distracted at times   Subjective   Subjective Patient states that it feels good to stand   RLE Assessment   RLE Assessment   (Range of motion within functional limits, strength 3+/5)   LLE Assessment   LLE Assessment   (Range of motion within functional limits, strength 3+/5)   Coordination   Movements are Fluid and Coordinated 0   Coordination and Movement Description Decreased coordination  with all bed mobility, transfers, gait activity   Bed Mobility   Supine to Sit 3  Moderate assistance   Additional items Assist x 1;HOB elevated;Verbal cues;LE management   Sit to Supine 3  Moderate assistance   Additional items Assist x 1;Verbal cues;LE management   Transfers   Sit to Stand 2  Maximal assistance   Additional items Assist x 1   Stand to Sit 2  Maximal assistance   Additional items Assist x 1   Ambulation/Elevation   Gait Assistance 2  Maximal assist   Additional items Assist x 1;Verbal cues;Tactile cues   Assistive Device Rolling walker   Distance 1-2 shuffling/sliding type steps with max assist for weight shifting and max assist to move walker.  Patient standing with flexed knee posture with posterior loss of balance   Balance   Static Sitting Fair -   Dynamic Sitting Poor   Static Standing Poor   Dynamic Standing Poor -   Ambulatory Poor -   Activity Tolerance   Activity Tolerance Patient limited by fatigue;Treatment limited secondary to medical complications (Comment)  (Limited by weakness)   Nurse Made Aware Yes   Assessment   Prognosis Good   Problem List Decreased range of motion;Decreased strength;Decreased endurance;Impaired balance;Decreased mobility;Decreased coordination;Pain   Assessment Patient seen for Physical Therapy evaluation. Patient admitted with COVID.  Comorbidities affecting patient's physical performance include: .  Personal factors affecting patient at time of initial evaluation include: ambulating with assistive device, inability to ambulate household distances, inability to navigate community distances, inability to navigate level surfaces without external assistance, inability to perform dynamic tasks in community, limited home support, positive fall history, inability to perform physical activity, inability to perform ADLS, and inability to perform IADLS . Prior to admission, patient was independent with functional mobility with Walker, independent with ADLS, requiring  assist for IADLS, ambulating household distance, and home with family assist.  Please find objective findings from Physical Therapy assessment regarding body systems outlined above with impairments and limitations including weakness, decreased ROM, impaired balance, decreased endurance, impaired coordination, gait deviations, pain, decreased activity tolerance, decreased functional mobility tolerance, fall risk, and SOB upon exertion.  The Barthel Index was used as a functional outcome tool presenting with a score of Barthel Index Score: 30 today indicating marked limitations of functional mobility and ADLS.  Patient's clinical presentation is currently unstable/unpredictable as seen in patient's presentation of vital sign response, changing level of pain, increased fall risk, new onset of impairment of functional mobility, decreased endurance, and new onset of weakness. Pt would benefit from continued Physical Therapy treatment to address deficits as defined above and maximize level of functional mobility. As demonstrated by objective findings, the assigned level of complexity for this evaluation is high.The patient's -[facility] Basic Mobility Inpatient Short Form Raw Score is 9. A Raw score of less than or equal to 16 suggests the patient may benefit from discharge to post-acute rehabilitation services. Please also refer to the recommendation of the Physical Therapist for safe discharge planning.   Goals   Patient Goals To get stronger and get back to walking   STG Expiration Date 08/28/24   Short Term Goal #1 Transfers and gait with roller walker with min assist   Short Term Goal #2 Gait endurance to 50 feet, strength bilateral lower extremities 3+/4 -   LTG Expiration Date 09/04/24   Long Term Goal #1 Strength bilateral lower extremities 4 -/4   Long Term Goal #2 Independent transfers and gait with rolling walker for functional household distances   Plan   Treatment/Interventions ADL retraining;Functional transfer  training;LE strengthening/ROM;Therapeutic exercise;Endurance training;Patient/family training;Equipment eval/education;Bed mobility;Gait training;Compensatory technique education   PT Frequency Other (Comment)  (5 times per week)   Discharge Recommendation   Rehab Resource Intensity Level, PT II (Moderate Resource Intensity)   AM-PAC Basic Mobility Inpatient   Turning in Flat Bed Without Bedrails 2   Lying on Back to Sitting on Edge of Flat Bed Without Bedrails 2   Moving Bed to Chair 1   Standing Up From Chair Using Arms 2   Walk in Room 1   Climb 3-5 Stairs With Railing 1   Basic Mobility Inpatient Raw Score 9   Turning Head Towards Sound 3   Follow Simple Instructions 3   Low Function Basic Mobility Raw Score  15   Low Function Basic Mobility Standardized Score  23.9   [facility] Highest Level Of Mobility   -HL Goal 3: Sit at edge of bed   -[facility] Achieved 3: Sit at edge of bed   Barthel Index   Feeding 5   Bathing 0   Grooming Score 0   Dressing Score 5   Bladder Score 0   Bowels Score 10   Toilet Use Score 5   Transfers (Bed/Chair) Score 5   Mobility (Level Surface) Score 0   Stairs Score 0   Barthel Index Score 30   Additional Treatment Session   Start Time 1340   End Time 1355   Treatment Assessment S: patient with abdominal pain at this time O: Bilateral lower extremity exercise completed as listed below A: Patient will benefit from continued physical therapy with progression as tolerated and increasing functional mobility with clinical staff as well   Exercises   Heelslides Supine;5 reps;Bilateral   Hip Abduction Supine;5 reps;Bilateral   Knee AROM Short Arc Quad Supine;5 reps;Bilateral   Ankle Pumps Supine;10 reps;Bilateral   Bridging Supine  (3 repetitions)   Licensure   [place] License Number  Lo Carson PT 4 0 [id number]

## 2024-08-21 NOTE — OCCUPATIONAL THERAPY NOTE
OT EVALUATION       08/21/24 0955   OT Last Visit   OT Visit Date 08/21/24   Note Type   Note type Evaluation   Pain Assessment   Pain Assessment Tool 0-10   Pain Score No Pain   Restrictions/Precautions   Other Precautions Chair Alarm;Bed Alarm;Fall Risk   Home Living   Type of Home Apartment   Home Layout One level  (0 MARI)   Bathroom Shower/Tub Walk-in shower   Bathroom Toilet Standard   Bathroom Equipment Shower chair;Grab bars in shower;Commode  (used commode over toilet)   Home Equipment Walker   Prior Function   Level of [place] Independent with functional mobility;Independent with ADLs;Needs assistance with IADLS   Lives With Spouse   Receives Help From Family   Falls in the last 6 months 1 to 4   Lifestyle   Reciprocal Relationships wife present for session   General   Additional General Comments Patient admitted with fall and back pain found to have COVID.   ADL   Eating Assistance 5  Supervision/Setup   Grooming Assistance 4  Minimal Assistance   UB Bathing Assistance 3  Moderate Assistance   LB Bathing Assistance 2  Maximal Assistance   UB Dressing Assistance 3  Moderate Assistance   LB Dressing Assistance 2  Maximal Assistance   Toileting Assistance  2  Maximal Assistance   Bed Mobility   Supine to Sit 3  Moderate assistance   Additional items Assist x 1;Verbal cues   Sit to Supine 2  Maximal assistance   Additional items Assist x 1;Verbal cues   Transfers   Sit to Stand 3  Moderate assistance   Additional items Assist x 2;Verbal cues   Stand to Sit 3  Moderate assistance   Additional items Assist x 2;Verbal cues   Functional Mobility   Functional Mobility 3  Moderate assistance   Additional Comments assist of 2, few steps to head of bed with RW   Balance   Static Sitting Poor   Dynamic Sitting Poor -   Static Standing Poor -   Dynamic Standing Poor -   Activity Tolerance   Activity Tolerance Patient limited by fatigue   Nurse Made Aware yes, June, present for some of session   RUE Assessment   RUE  Assessment WFL   LUE Assessment   LUE Assessment WFL   Cognition   Overall Cognitive Status WFL   Arousal/Participation Cooperative   Attention Attends with cues to redirect   Orientation Level Oriented X4   Following Commands Follows one step commands with increased time or repetition   Assessment   Limitation Decreased ADL status;Decreased UE strength;Decreased Safe judgement during ADL;Decreased endurance;Decreased high-level ADLs;Decreased self-care trans  (decreased balance and mobility)   Prognosis Good   Assessment Patient evaluated by Occupational Therapy.  Patient admitted with COVID.  The patients occupational profile, medical and therapy history includes a extensive additional review of physical, cognitive, or psychosocial history related to current functional performance.  Comorbidities affecting functional mobility and ADLS include: CAD, CVA, and Parkinsons.  Prior to admission, patient was independent with functional mobility with walker, independent with ADLS, and requiring assist for IADLS.  The evaluation identifies the following performance deficits: weakness, impaired balance, decreased endurance, increased fall risk, new onset of impairment of functional mobility, decreased ADLS, decreased IADLS, decreased activity tolerance, decreased safety awareness, impaired judgement, and decreased strength, that result in activity limitations and/or participation restrictions. This evaluation requires clinical decision making of high complexity, because the patient presents with comorbidites that affect occupational performance and required significant modification of tasks or assistance with consideration of multiple treatment options.  The Barthel Index was used as a functional outcome tool presenting with a score of Barthel Index Score: 25, indicating marked limitations of functional mobility and ADLS.  The patient's raw score on the -PAC Daily Activity Inpatient Short Form is 12. A raw score of less  than 19 suggests the patient may benefit from discharge to post-acute rehabilitation services. Please refer to the recommendation of the Occupational Therapist for safe discharge planning.  Patient will benefit from skilled Occupational Therapy services to address above deficits and facilitate a safe return to prior level of function.   Goals   Patient Goals to go home   STG Time Frame   (1-7 days)   Short Term Goal  Goals established to promote Patient Goals: to go home:  Eating: independent; Grooming: mod assist standing at sink; Bathing: mod assist; Upper Body Dressing min assist; Lower Body Dressing: mod assist; Toileting: mod assist; Patient will increase stand pivot commode transfer to mod assist with rolling walker to increase performance and safety with ADLS and functional mobility; Patient will increase standing tolerance to 3 minutes during ADL task to decrease assistance level and decrease fall risk; Patient will increase bed mobility to min assist in preparation for ADLS and transfers; Patient will increase functional mobility to and from bathroom with rolling walker with mod assist to increase performance with ADLS and to use a toilet; Patient will tolerate 5 minutes of UE ROM/strengthening to increase general activity tolerance and performance in ADLS/IADLS; Patient will improve functional activity tolerance to 10 minutes of sustained functional tasks to increase participation in basic self-care and decrease assistance level;  Patient will be able to to verbalize understanding and perform energy conservation/proper body mechanics during ADLS and functional mobility at least 75% of the time with minimal cueing to decrease signs of fatigue and increase stamina to return to prior level of function; Patient will increase dynamic sitting balance to poor+ to improve the ability to sit at edge of bed or on a chair for ADLS;  Patient will increase dynamic standing balance to poor to improve postural stability  and decrease fall risk during standing ADLS and transfers.   LTG Time Frame   (8-14 days)   Long Term Goal Grooming: min assist standing at sink; Bathing: min assist; Upper Body Dressing supervision; Lower Body Dressing: min assist; Toileting: min assist; Patient will increase stand pivot commode transfer to min assist with rolling walker to increase performance and safety with ADLS and functional mobility; Patient will increase standing tolerance to 6 minutes during ADL task to decrease assistance level and decrease fall risk; Patient will increase bed mobility to supervision in preparation for ADLS and transfers; Patient will increase functional mobility to and from bathroom with rolling walker with min assist to increase performance with ADLS and to use a toilet; Patient will tolerate 10 minutes of UE ROM/strengthening to increase general activity tolerance and performance in ADLS/IADLS; Patient will improve functional activity tolerance to 20 minutes of sustained functional tasks to increase participation in basic self-care and decrease assistance level;  Patient will be able to to verbalize understanding and perform energy conservation/proper body mechanics during ADLS and functional mobility at least 90% of the time to decrease signs of fatigue and increase stamina to return to prior level of function; Patient will increase dynamic sitting balance to fair- to improve the ability to sit at edge of bed or on a chair for ADLS;  Patient will increase dynamic standing balance to poor+ to improve postural stability and decrease fall risk during standing ADLS and transfers.  Pt will score >/= 16/24 on AM-PAC Daily Activity Inpatient scale to promote safe independence with ADLs and functional mobility; Pt will score >/= 55/100 on Barthel Index in order to decrease caregiver assistance needed and increase ability to perform ADLs and functional mobility.   Plan   Treatment Interventions ADL retraining;Functional  transfer training;UE strengthening/ROM;Endurance training;Patient/family training;Equipment evaluation/education;Activityengagement;Compensatory technique education   Goal Expiration Date 09/04/24   OT Frequency 3-5x/wk   Discharge Recommendation   Rehab Resource Intensity Level, OT II (Moderate Resource Intensity)   AM-PAC Daily Activity Inpatient   Lower Body Dressing 1   Bathing 2   Toileting 1   Upper Body Dressing 2   Grooming 3   Eating 3   Daily Activity Raw Score 12   Daily Activity Standardized Score (Calc for Raw Score >=11) 30.6   AM-PAC Applied Cognition Inpatient   Following a Speech/Presentation 4   Understanding Ordinary Conversation 4   Taking Medications 4   Remembering Where Things Are Placed or Put Away 3   Remembering List of 4-5 Errands 3   Taking Care of Complicated Tasks 3   Applied Cognition Raw Score 21   Applied Cognition Standardized Score 44.3   Barthel Index   Feeding 5   Bathing 0   Grooming Score 0   Dressing Score 0   Bladder Score 0   Bowels Score 10   Toilet Use Score 5   Transfers (Bed/Chair) Score 5   Mobility (Level Surface) Score 0   Stairs Score 0   Barthel Index Score 25   Licensure   [place] License Number  Laxmi Bazan MS OTR/L [id number]

## 2024-08-21 NOTE — ASSESSMENT & PLAN NOTE
Patient has a history of pAF, previously anticoagulated with Eliquis but noting it was stopped in January secondary to reported allergy- confirmed with patient's wife. Patient not taking any   EKG in ED: NSR with HR 67     Extensive discussion with patient and his wife about proper anticoagulation for A-fib, CAD and history of strokes.  Risks and benefits discussed and wife and patient agreed to begin Eliquis which was previously recommended by cardiology in December 2023 per chart review.    Eliquis 5 mg twice daily resumed 8/23  Discharged with Eliquis for AC

## 2024-08-21 NOTE — ASSESSMENT & PLAN NOTE
D dimer = 1.01 - corrected normal = 0.88    CT chest with contrast: Negative for contusion or infiltrate. Lower lobe hypoventilation,  pleural parenchymal scarring,  Small benign calcified granuloma in the left   lower lobe, Small lingular juxtapleural bulla without suspicious features     Low index of suspicion for PE  Discharge patient on home Brilinta, and Eliquis for anticoagulation for Proximal Afib

## 2024-08-21 NOTE — ASSESSMENT & PLAN NOTE
Blood culture showed Gram positive cocci in pairs, chains and clusters. ID panel shows Streptococcus mitis oralis group, not Enterococcus, Streptococcus agalactiae, or Streptococcus pyogene. Streptococcus mitis oralis group is sensitive to ceftriaxone. Most likely contamination.    WBC WNL  CRP 66.5>98.1>144>133 trending down    repeat blood culture NG for 48 hours  discontinued ceftriaxone (8/21-23)

## 2024-08-21 NOTE — PROGRESS NOTES
[facility] Progress Note - Pavan Edward 88 y.o. male MRN: [id number]    Unit/Bed#: [address] [place] [address] Encounter: [id number]      Assessment/Plan:  * COVID  Assessment & Plan  Presented with abdominal pain, back pain, s/p fall, as well as fever 101.0 F prior to arrival.     -COVID +   -WBC 5.11  -Procal 0.12  -LA 0.8  -INR 15.2  -CRP 66>88  -D-dimer 1.01, corrected 0.88    Continue mild COVID pathway   Remdesivir 200 mg on 8/20, followed by 100 mg for 2 doses (8/21-8/22)  Monitor oxygenation; supplement as needed   Mucinex for congestion   Robitussin every 4 hours scheduled for coughing  Tylenol as needed for pain or fever    Positive blood culture  Assessment & Plan  Blood culture showed Gram positive cocci in pairs, chains and clusters. ID panel shows streptococcus, not Enterococcus, Streptococcus agalactiae, or Streptococcus pyogene.    WBC WNL  CRP 66.5>98.1    Ordered repeat blood culture  Await sensitivities of culture  Consider beginning ceftriaxone after repeat blood culture has been collected    Weakness  Assessment & Plan  Pt presents with generalized weakness.     Likely secondary to COVID vs. Deconditioning     Continue to treat Covid and see how pt responds  TSH pending  Iron panel pending    PT/OT    Parkinson's disease  Assessment & Plan  Chronic.  Home medication 1 mg Requip 3 times daily.  Per wife, patient and her were not thrilled with past neurologist and has to find new neurologist in the area.    Continue home medication  Continue to follow-up outpatient neurology    Paroxysmal atrial fibrillation (HCC)  Assessment & Plan  Patient has a history of pAF, previously anticoagulated with Eliquis but noting it was stopped in January secondary to reported allergy (paralysis reaction)- confirmed with patient's wife. Patient not taking any   EKG in ED: NSR with HR 67       Positive D dimer  Assessment & Plan  D dimer = 1.01 - corrected normal = 0.88    CT chest with contrast: Negative for  "contusion or infiltrate. Lower lobe hypoventilation,  pleural parenchymal scarring,  Small benign calcified granuloma in the left   lower lobe, Small lingular juxtapleural bulla without suspicious features     Low index of suspicion for PE    Renal cyst, left  Assessment & Plan  CT abdomen showed 1.5 cm left renal hyperdense nodule likely a hemorrhagic or proteinaceous cyst.     Continue to monitor    Aneurysm of common iliac artery (HCC)  Assessment & Plan  Incidental finding 2.4 cm right common iliac artery aneurysm on CT chest/abd/pelvis.     Follow-up outpatient    Ambulatory dysfunction  Assessment & Plan  S/p fall.     CT head: No intracranial hemorrhage or calvarial fracture.  CT spine: No cervical spine fracture or traumatic malalignment.    PT/OT    Hypertension  Assessment & Plan  Initially elevated upon admission.  Previously on 10 mg lisinopril.  Per patient and wife, not on any hypertension medication at this time.    CAD (coronary artery disease)  Assessment & Plan  Home medication of Brilanta 60 mg BID.     Continue home medication.    Hypothyroidism  Assessment & Plan  Home medication levothyroxine 125 mcg daily. TSH 0.293.    Repeat T4 pending   Decrease home dose to levothyroxine 100 mcg daily.  Recommend patient to monitor outpatient and repeat TSH with T4 in 6 to 8 weeks        Subjective:   Pt seen and examined at bedside.  Patient states he is feeling much better.  He is having some difficulty feeding himself breakfast due to his resting tremors.  Patient denies chest pain, shortness of breath, weakness, numbness or any other complaints at this time.    Objective:     Vitals: Blood pressure (!) 184/93, pulse 78, temperature 98.2 °F (36.8 °C), resp. rate 18, height 6' 1\" (1.854 m), weight 79.8 kg (176 lb), SpO2 95%.,Body mass index is 23.22 kg/m².  Wt Readings from Last 3 Encounters:   08/21/24 79.8 kg (176 lb)   08/20/24 86.1 kg (189 lb 13.1 oz)       Intake/Output Summary (Last 24 hours) at " 8/21/2024 0846  Last data filed at 8/21/2024 0555  Gross per 24 hour   Intake 800 ml   Output 300 ml   Net 500 ml       Physical Exam  Vitals and nursing note reviewed.   Constitutional:       General: He is not in acute distress.     Appearance: He is well-developed.   HENT:      Head: Normocephalic and atraumatic.   Eyes:      Conjunctiva/sclera: Conjunctivae normal.   Cardiovascular:      Rate and Rhythm: Normal rate and regular rhythm.      Heart sounds: No murmur heard.  Pulmonary:      Effort: Pulmonary effort is normal. No respiratory distress.      Breath sounds: Normal breath sounds.   Abdominal:      Palpations: Abdomen is soft.      Tenderness: There is no abdominal tenderness.   Musculoskeletal:         General: No swelling.      Cervical back: Neck supple.   Skin:     General: Skin is warm and dry.      Capillary Refill: Capillary refill takes less than 2 seconds.   Neurological:      Mental Status: He is alert.      Comments: Resting tremors of bilateral upper extremities   Psychiatric:         Mood and Affect: Mood normal.           Recent Results (from the past 24 hour(s))   ECG 12 lead    Collection Time: 08/20/24 10:52 AM   Result Value Ref Range    Ventricular Rate 67 BPM    Atrial Rate 67 BPM    VT Interval 196 ms    QRSD Interval 110 ms    QT Interval 406 ms    QTC Interval 429 ms    P Axis 70 degrees    QRS Axis -59 degrees    T Wave Axis 59 degrees   CBC and differential    Collection Time: 08/20/24 11:03 AM   Result Value Ref Range    WBC 5.11 4.31 - 10.16 Thousand/uL    RBC 4.38 3.88 - 5.62 Million/uL    Hemoglobin 12.9 12.0 - 17.0 g/dL    Hematocrit 40.4 36.5 - 49.3 %    MCV 92 82 - 98 fL    MCH 29.5 26.8 - 34.3 pg    MCHC 31.9 31.4 - 37.4 g/dL    RDW 12.8 11.6 - 15.1 %    MPV 11.6 8.9 - 12.7 fL    Platelets 119 (L) 149 - 390 Thousands/uL    nRBC 0 /100 WBCs    Segmented % 87 (H) 43 - 75 %    Immature Grans % 0 0 - 2 %    Lymphocytes % 3 (L) 14 - 44 %    Monocytes % 10 4 - 12 %     Eosinophils Relative 0 0 - 6 %    Basophils Relative 0 0 - 1 %    Absolute Neutrophils 4.42 1.85 - 7.62 Thousands/µL    Absolute Immature Grans 0.01 0.00 - 0.20 Thousand/uL    Absolute Lymphocytes 0.17 (L) 0.60 - 4.47 Thousands/µL    Absolute Monocytes 0.49 0.17 - 1.22 Thousand/µL    Eosinophils Absolute 0.01 0.00 - 0.61 Thousand/µL    Basophils Absolute 0.01 0.00 - 0.10 Thousands/µL   Comprehensive metabolic panel    Collection Time: 08/20/24 11:03 AM   Result Value Ref Range    Sodium 139 135 - 147 mmol/L    Potassium 3.8 3.5 - 5.3 mmol/L    Chloride 108 96 - 108 mmol/L    CO2 27 21 - 32 mmol/L    ANION GAP 4 4 - 13 mmol/L    BUN 24 5 - 25 mg/dL    Creatinine 1.00 0.60 - 1.30 mg/dL    Glucose 90 65 - 140 mg/dL    Calcium 8.6 8.4 - 10.2 mg/dL    AST 16 13 - 39 U/L    ALT 14 7 - 52 U/L    Alkaline Phosphatase 40 34 - 104 U/L    Total Protein 6.1 (L) 6.4 - 8.4 g/dL    Albumin 3.7 3.5 - 5.0 g/dL    Total Bilirubin 0.92 0.20 - 1.00 mg/dL    eGFR 66 ml/min/1.73sq m   Lactic acid    Collection Time: 08/20/24 11:03 AM   Result Value Ref Range    LACTIC ACID 0.8 0.5 - 2.0 mmol/L   Procalcitonin    Collection Time: 08/20/24 11:03 AM   Result Value Ref Range    Procalcitonin 0.12 <=0.25 ng/ml   Protime-INR    Collection Time: 08/20/24 11:03 AM   Result Value Ref Range    Protime 15.2 (H) 12.3 - 15.0 seconds    INR 1.15 0.85 - 1.19   APTT    Collection Time: 08/20/24 11:03 AM   Result Value Ref Range    PTT 30 23 - 34 seconds   Blood culture #1    Collection Time: 08/20/24 11:03 AM    Specimen: Arm, Right; Blood   Result Value Ref Range    Gram Stain Result Gram positive cocci in pairs, chains and clusters (A)    FLU/RSV/COVID - if FLU/RSV clinically relevant    Collection Time: 08/20/24 11:03 AM    Specimen: Nose; Nares   Result Value Ref Range    SARS-CoV-2 Positive (A) Negative    INFLUENZA A PCR Negative Negative    INFLUENZA B PCR Negative Negative    RSV PCR Negative Negative   TSH, 3rd generation with Free T4 reflex     Collection Time: 08/20/24 11:03 AM   Result Value Ref Range    TSH 3RD GENERATON 0.293 (L) 0.450 - 4.500 uIU/mL   Lipid Panel with Direct LDL reflex    Collection Time: 08/20/24 11:03 AM   Result Value Ref Range    Cholesterol 178 See Comment mg/dL    Triglycerides 66 See Comment mg/dL    HDL, Direct 53 >=40 mg/dL    LDL Calculated 112 (H) 0 - 100 mg/dL   Magnesium    Collection Time: 08/20/24 11:03 AM   Result Value Ref Range    Magnesium 2.1 1.9 - 2.7 mg/dL   T4, free    Collection Time: 08/20/24 11:03 AM   Result Value Ref Range    Free T4 1.03 0.61 - 1.12 ng/dL   TIBC Panel (incl. Iron, TIBC, % Iron Saturation)    Collection Time: 08/20/24 11:03 AM   Result Value Ref Range    Iron Saturation 11 (L) 15 - 50 %    TIBC 271 250 - 450 ug/dL    Iron 30 (L) 50 - 212 ug/dL    UIBC 241 155 - 355 ug/dL   Ferritin    Collection Time: 08/20/24 11:03 AM   Result Value Ref Range    Ferritin 166 24 - 336 ng/mL   Blood Culture Identification Panel    Collection Time: 08/20/24 11:03 AM    Specimen: Arm, Right; Blood   Result Value Ref Range    Streptococcus Detected (A) Not Detected   C-reactive protein    Collection Time: 08/20/24  9:43 PM   Result Value Ref Range    CRP 66.5 (H) <3.0 mg/L   D-dimer, quantitative    Collection Time: 08/20/24  9:43 PM   Result Value Ref Range    D-Dimer, Quant 1.01 (H) <0.50 ug/ml FEU   UA w Reflex to Microscopic w Reflex to Culture    Collection Time: 08/21/24  4:32 AM    Specimen: Urine, Clean Catch   Result Value Ref Range    Color, UA Yellow     Clarity, UA Clear     Specific Gravity, UA >=1.030 1.005 - 1.030    pH, UA 6.0 4.5, 5.0, 5.5, 6.0, 6.5, 7.0, 7.5, 8.0    Leukocytes, UA Negative Negative    Nitrite, UA Negative Negative    Protein, UA 30 (1+) (A) Negative mg/dl    Glucose, UA 30 (3/100%) (A) Negative mg/dl    Ketones, UA 10 (1+) (A) Negative mg/dl    Urobilinogen, UA 2.0 (A) <2.0 mg/dl mg/dl    Bilirubin, UA Negative Negative    Occult Blood, UA Small (A) Negative   Urine  Microscopic    Collection Time: 08/21/24  4:32 AM   Result Value Ref Range    RBC, UA 1-2 None Seen, 0-1, 1-2, 2-4, 0-5 /hpf    WBC, UA 0-1 None Seen, 0-1, 1-2, 0-5, 2-4 /hpf    Epithelial Cells None Seen None Seen, Occasional /hpf    Bacteria, UA Occasional None Seen, Occasional /hpf    MUCUS THREADS Moderate (A) None Seen   C-reactive protein    Collection Time: 08/21/24  5:28 AM   Result Value Ref Range    CRP 98.1 (H) <3.0 mg/L   CBC and differential    Collection Time: 08/21/24  5:28 AM   Result Value Ref Range    WBC 4.86 4.31 - 10.16 Thousand/uL    RBC 4.26 3.88 - 5.62 Million/uL    Hemoglobin 12.7 12.0 - 17.0 g/dL    Hematocrit 39.5 36.5 - 49.3 %    MCV 93 82 - 98 fL    MCH 29.8 26.8 - 34.3 pg    MCHC 32.2 31.4 - 37.4 g/dL    RDW 13.0 11.6 - 15.1 %    MPV 12.1 8.9 - 12.7 fL    Platelets 112 (L) 149 - 390 Thousands/uL    nRBC 0 /100 WBCs    Segmented % 82 (H) 43 - 75 %    Immature Grans % 0 0 - 2 %    Lymphocytes % 7 (L) 14 - 44 %    Monocytes % 11 4 - 12 %    Eosinophils Relative 0 0 - 6 %    Basophils Relative 0 0 - 1 %    Absolute Neutrophils 3.95 1.85 - 7.62 Thousands/µL    Absolute Immature Grans 0.01 0.00 - 0.20 Thousand/uL    Absolute Lymphocytes 0.36 (L) 0.60 - 4.47 Thousands/µL    Absolute Monocytes 0.53 0.17 - 1.22 Thousand/µL    Eosinophils Absolute 0.00 0.00 - 0.61 Thousand/µL    Basophils Absolute 0.01 0.00 - 0.10 Thousands/µL   Comprehensive metabolic panel    Collection Time: 08/21/24  5:28 AM   Result Value Ref Range    Sodium 141 135 - 147 mmol/L    Potassium 4.6 3.5 - 5.3 mmol/L    Chloride 112 (H) 96 - 108 mmol/L    CO2 24 21 - 32 mmol/L    ANION GAP 5 4 - 13 mmol/L    BUN 23 5 - 25 mg/dL    Creatinine 0.97 0.60 - 1.30 mg/dL    Glucose 82 65 - 140 mg/dL    Calcium 8.2 (L) 8.4 - 10.2 mg/dL    AST 15 13 - 39 U/L    ALT 13 7 - 52 U/L    Alkaline Phosphatase 37 34 - 104 U/L    Total Protein 5.5 (L) 6.4 - 8.4 g/dL    Albumin 3.5 3.5 - 5.0 g/dL    Total Bilirubin 0.70 0.20 - 1.00 mg/dL    eGFR 69  ml/min/1.73sq m   Procalcitonin    Collection Time: 08/21/24  5:28 AM   Result Value Ref Range    Procalcitonin 0.09 <=0.25 ng/ml   Magnesium    Collection Time: 08/21/24  5:28 AM   Result Value Ref Range    Magnesium 2.1 1.9 - 2.7 mg/dL       Current Facility-Administered Medications   Medication Dose Route Frequency Provider Last Rate Last Admin    acetaminophen (TYLENOL) tablet 650 mg  650 mg Oral Q6H PRN Tevin Oneil MD        enoxaparin (LOVENOX) subcutaneous injection 40 mg  40 mg Subcutaneous Daily Tevin Oneil MD   40 mg at 08/20/24 1659    guaiFENesin (MUCINEX) 12 hr tablet 600 mg  600 mg Oral Q12H SAM Oneil MD   600 mg at 08/20/24 2121    guaiFENesin (ROBITUSSIN) oral liquid 200 mg  200 mg Oral Q4H PRN Tevin Oneil MD        levothyroxine tablet 100 mcg  100 mcg Oral Early Morning Tevin Oneil MD   100 mcg at 08/21/24 0521    polyethylene glycol (MIRALAX) packet 17 g  17 g Oral Daily PRN Tevin Oneil MD        remdesivir (Veklury) 100 mg in sodium chloride 0.9 % 270 mL IVPB  100 mg Intravenous Q24H Tevin Oneil MD        rOPINIRole (REQUIP) tablet 1 mg  1 mg Oral TID Tevin Oneil MD   1 mg at 08/20/24 2122    sodium chloride 0.9 % infusion  50 mL/hr Intravenous Continuous Tevin Oneil MD 50 mL/hr at 08/20/24 1844 50 mL/hr at 08/20/24 1844    ticagrelor tablet 60 mg  60 mg Oral Q12H SAM Oneil MD   60 mg at 08/20/24 2122       Invasive Devices       Peripheral Intravenous Line  Duration             Peripheral IV 08/20/24 Right Antecubital <1 day              Drain  Duration             External Urinary Catheter 1 day                    Lab, Imaging and other studies: I have personally reviewed pertinent reports.    VTE Pharmacologic Prophylaxis: Enoxaparin (Lovenox)  VTE Mechanical Prophylaxis: sequential compression device    Tevin Oneil MD

## 2024-08-22 LAB
ALBUMIN SERPL BCG-MCNC: 3.3 G/DL (ref 3.5–5)
ALP SERPL-CCNC: 33 U/L (ref 34–104)
ALT SERPL W P-5'-P-CCNC: 15 U/L (ref 7–52)
ANION GAP SERPL CALCULATED.3IONS-SCNC: 8 MMOL/L (ref 4–13)
AST SERPL W P-5'-P-CCNC: 16 U/L (ref 13–39)
BASOPHILS # BLD AUTO: 0.02 THOUSANDS/ÂΜL (ref 0–0.1)
BASOPHILS NFR BLD AUTO: 0 % (ref 0–1)
BILIRUB SERPL-MCNC: 0.86 MG/DL (ref 0.2–1)
BUN SERPL-MCNC: 19 MG/DL (ref 5–25)
CALCIUM ALBUM COR SERPL-MCNC: 8.6 MG/DL (ref 8.3–10.1)
CALCIUM SERPL-MCNC: 8 MG/DL (ref 8.4–10.2)
CHLORIDE SERPL-SCNC: 107 MMOL/L (ref 96–108)
CO2 SERPL-SCNC: 22 MMOL/L (ref 21–32)
CREAT SERPL-MCNC: 0.77 MG/DL (ref 0.6–1.3)
EOSINOPHIL # BLD AUTO: 0.01 THOUSAND/ÂΜL (ref 0–0.61)
EOSINOPHIL NFR BLD AUTO: 0 % (ref 0–6)
ERYTHROCYTE [DISTWIDTH] IN BLOOD BY AUTOMATED COUNT: 12.6 % (ref 11.6–15.1)
GFR SERPL CREATININE-BSD FRML MDRD: 81 ML/MIN/1.73SQ M
GLUCOSE SERPL-MCNC: 137 MG/DL (ref 65–140)
HCT VFR BLD AUTO: 39.8 % (ref 36.5–49.3)
HGB BLD-MCNC: 12.8 G/DL (ref 12–17)
IMM GRANULOCYTES # BLD AUTO: 0.01 THOUSAND/UL (ref 0–0.2)
IMM GRANULOCYTES NFR BLD AUTO: 0 % (ref 0–2)
LG PLATELETS BLD QL SMEAR: PRESENT
LYMPHOCYTES # BLD AUTO: 0.14 THOUSANDS/ÂΜL (ref 0.6–4.47)
LYMPHOCYTES NFR BLD AUTO: 3 % (ref 14–44)
MAGNESIUM SERPL-MCNC: 1.8 MG/DL (ref 1.9–2.7)
MCH RBC QN AUTO: 29.3 PG (ref 26.8–34.3)
MCHC RBC AUTO-ENTMCNC: 32.2 G/DL (ref 31.4–37.4)
MCV RBC AUTO: 91 FL (ref 82–98)
MONOCYTES # BLD AUTO: 0.41 THOUSAND/ÂΜL (ref 0.17–1.22)
MONOCYTES NFR BLD AUTO: 7 % (ref 4–12)
NEUTROPHILS # BLD AUTO: 4.94 THOUSANDS/ÂΜL (ref 1.85–7.62)
NEUTS SEG NFR BLD AUTO: 90 % (ref 43–75)
NRBC BLD AUTO-RTO: 0 /100 WBCS
PLATELET # BLD AUTO: 96 THOUSANDS/UL (ref 149–390)
PLATELET BLD QL SMEAR: ABNORMAL
PMV BLD AUTO: 11.8 FL (ref 8.9–12.7)
POTASSIUM SERPL-SCNC: 3.9 MMOL/L (ref 3.5–5.3)
PROT SERPL-MCNC: 5.5 G/DL (ref 6.4–8.4)
RBC # BLD AUTO: 4.37 MILLION/UL (ref 3.88–5.62)
RBC MORPH BLD: NORMAL
SODIUM SERPL-SCNC: 137 MMOL/L (ref 135–147)
WBC # BLD AUTO: 5.53 THOUSAND/UL (ref 4.31–10.16)

## 2024-08-22 PROCEDURE — 85025 COMPLETE CBC W/AUTO DIFF WBC: CPT

## 2024-08-22 PROCEDURE — 99232 SBSQ HOSP IP/OBS MODERATE 35: CPT | Performed by: INTERNAL MEDICINE

## 2024-08-22 PROCEDURE — 80053 COMPREHEN METABOLIC PANEL: CPT

## 2024-08-22 PROCEDURE — 83735 ASSAY OF MAGNESIUM: CPT

## 2024-08-22 PROCEDURE — 97530 THERAPEUTIC ACTIVITIES: CPT

## 2024-08-22 RX ORDER — SODIUM CHLORIDE, SODIUM GLUCONATE, SODIUM ACETATE, POTASSIUM CHLORIDE, MAGNESIUM CHLORIDE, SODIUM PHOSPHATE, DIBASIC, AND POTASSIUM PHOSPHATE .53; .5; .37; .037; .03; .012; .00082 G/100ML; G/100ML; G/100ML; G/100ML; G/100ML; G/100ML; G/100ML
75 INJECTION, SOLUTION INTRAVENOUS CONTINUOUS
Status: DISCONTINUED | OUTPATIENT
Start: 2024-08-22 | End: 2024-08-23

## 2024-08-22 RX ORDER — MAGNESIUM SULFATE HEPTAHYDRATE 40 MG/ML
2 INJECTION, SOLUTION INTRAVENOUS ONCE
Status: COMPLETED | OUTPATIENT
Start: 2024-08-22 | End: 2024-08-22

## 2024-08-22 RX ADMIN — ENOXAPARIN SODIUM 40 MG: 40 INJECTION SUBCUTANEOUS at 09:07

## 2024-08-22 RX ADMIN — LEVOTHYROXINE SODIUM 100 MCG: 100 TABLET ORAL at 05:31

## 2024-08-22 RX ADMIN — MAGNESIUM SULFATE HEPTAHYDRATE 2 G: 40 INJECTION, SOLUTION INTRAVENOUS at 09:07

## 2024-08-22 RX ADMIN — GUAIFENESIN 600 MG: 600 TABLET, EXTENDED RELEASE ORAL at 20:50

## 2024-08-22 RX ADMIN — ROPINIROLE 1 MG: 1 TABLET, FILM COATED ORAL at 20:50

## 2024-08-22 RX ADMIN — CEFTRIAXONE 1000 MG: 1 INJECTION, SOLUTION INTRAVENOUS at 19:31

## 2024-08-22 RX ADMIN — SODIUM CHLORIDE, SODIUM GLUCONATE, SODIUM ACETATE, POTASSIUM CHLORIDE, MAGNESIUM CHLORIDE, SODIUM PHOSPHATE, DIBASIC, AND POTASSIUM PHOSPHATE 75 ML/HR: .53; .5; .37; .037; .03; .012; .00082 INJECTION, SOLUTION INTRAVENOUS at 20:41

## 2024-08-22 RX ADMIN — TICAGRELOR 60 MG: 60 TABLET ORAL at 20:50

## 2024-08-22 RX ADMIN — GUAIFENESIN 200 MG: 200 SOLUTION ORAL at 05:51

## 2024-08-22 RX ADMIN — ACETAMINOPHEN 650 MG: 325 TABLET ORAL at 05:51

## 2024-08-22 RX ADMIN — REMDESIVIR 100 MG: 100 INJECTION, POWDER, LYOPHILIZED, FOR SOLUTION INTRAVENOUS at 15:44

## 2024-08-22 RX ADMIN — ROPINIROLE 1 MG: 1 TABLET, FILM COATED ORAL at 09:07

## 2024-08-22 RX ADMIN — GUAIFENESIN 600 MG: 600 TABLET, EXTENDED RELEASE ORAL at 09:13

## 2024-08-22 RX ADMIN — ROPINIROLE 1 MG: 1 TABLET, FILM COATED ORAL at 15:45

## 2024-08-22 RX ADMIN — LISINOPRIL 2.5 MG: 2.5 TABLET ORAL at 09:07

## 2024-08-22 RX ADMIN — TICAGRELOR 60 MG: 60 TABLET ORAL at 09:12

## 2024-08-22 NOTE — CASE MANAGEMENT
Case Management Discharge Planning Note    Patient name Pavan Edward  Location [address] [address]/[address] [place] [address]-* MRN [id number]  : 1935 Date 2024       Current Admission Date: 2024  Current Admission Diagnosis:COVID   Patient Active Problem List    Diagnosis Date Noted Date Diagnosed    Positive D dimer 2024     Paroxysmal atrial fibrillation (HCC) 2024     Positive blood culture 2024     Hypothyroidism 2024     CAD (coronary artery disease) 2024     Hypertension 2024     COVID 2024     Weakness 2024     Ambulatory dysfunction 2024     Aneurysm of common iliac artery (HCC) 2024     Renal cyst, left 2024     Parkinson's disease 2024       LOS (days): 2  Geometric Mean LOS (GMLOS) (days): 2.5  Days to GMLOS:0.4     OBJECTIVE:  Risk of Unplanned Readmission Score: 10.7       Current admission status: Inpatient   Preferred Pharmacy:   [facility] - [place], [place] - [address]  [address]  [facility] [address]  Phone: [phone] Fax: [phone]    Primary Care Provider: No primary care provider on file.    Primary Insurance: [employer] REP  Secondary Insurance:     DISCHARGE DETAILS:    Discharge planning discussed with:: Spouse Billigene  Freedom of Choice: Yes    Comments - Freedom of Choice: STR patient choice list was reviewed by phone with wife and she expressed preference for [facility].  SW reserved facility in [facility] and insurance authorization request was tasked to the CM DSC.  SW will continue to follow.      CM contacted family/caregiver?: Yes  Were Treatment Team discharge recommendations reviewed with patient/caregiver?: Yes  Did patient/caregiver verbalize understanding of patient care needs?: N/A- going to facility  Were patient/caregiver advised of the risks associated with not following Treatment Team discharge recommendations?: Yes    Contacts  Patient Contacts: Billigene Wood  (spouse)  Relationship to Patient:: Family  Contact Method: Phone  Phone Number: [phone]  Reason/Outcome: Discharge Planning    Other Referral/Resources/Interventions Provided:  Interventions: Short Term Rehab    Would you like to participate in our [facility] service program?  : No - Declined    Treatment Team Recommendation: Short Term Rehab  Discharge Destination Plan:: Short Term Rehab  Transport at Discharge : BLS Ambulance, Wheelchair van (pending progress)

## 2024-08-22 NOTE — PROGRESS NOTES
[facility] Progress Note - Pavan Edward 88 y.o. male MRN: [id number]    Unit/Bed#: [address] [place] [address] Encounter: [id number]      Assessment/Plan:  * COVID  Assessment & Plan  Presented with abdominal pain, back pain, s/p fall, as well as fever 101.0 F prior to arrival.     -COVID +   -WBC 5.11  -Procal 0.12  -LA 0.8  -INR 15.2  -CRP 66>88  -D-dimer 1.01, corrected 0.88    Continue mild COVID pathway   Remdesivir 200 mg on 8/20, followed by 100 mg for 2 doses (8/21-8/22)  Monitor oxygenation; supplement as needed   Mucinex for congestion   Robitussin every 4 hours scheduled for coughing  Tylenol as needed for pain or fever    Positive blood culture  Assessment & Plan  Blood culture showed Gram positive cocci in pairs, chains and clusters. ID panel shows streptococcus, not Enterococcus, Streptococcus agalactiae, or Streptococcus pyogene.    WBC WNL  CRP 66.5>98.1    Ordered repeat blood culture  Await sensitivities of culture  Began ceftriaxone daily (8/21-)    Weakness  Assessment & Plan  Pt presents with generalized weakness.     Likely secondary to COVID vs. Deconditioning     Continue to treat Covid and see how pt responds  TSH pending  Iron panel pending    PT/OT    Parkinson's disease  Assessment & Plan  Chronic.  Home medication 1 mg Requip 3 times daily.  Per wife, patient and her were not thrilled with past neurologist and has to find new neurologist in the area.    Continue home medication  Continue to follow-up outpatient neurology    Paroxysmal atrial fibrillation (HCC)  Assessment & Plan  Patient has a history of pAF, previously anticoagulated with Eliquis but noting it was stopped in January secondary to reported allergy (paralysis reaction)- confirmed with patient's wife. Patient not taking any   EKG in ED: NSR with HR 67       Positive D dimer  Assessment & Plan  D dimer = 1.01 - corrected normal = 0.88    CT chest with contrast: Negative for contusion or infiltrate. Lower lobe  "hypoventilation,  pleural parenchymal scarring,  Small benign calcified granuloma in the left   lower lobe, Small lingular juxtapleural bulla without suspicious features     Low index of suspicion for PE    Renal cyst, left  Assessment & Plan  CT abdomen showed 1.5 cm left renal hyperdense nodule likely a hemorrhagic or proteinaceous cyst.     Continue to monitor    Aneurysm of common iliac artery (HCC)  Assessment & Plan  Incidental finding 2.4 cm right common iliac artery aneurysm on CT chest/abd/pelvis.     Follow-up outpatient    Ambulatory dysfunction  Assessment & Plan  S/p fall.     CT head: No intracranial hemorrhage or calvarial fracture.  CT spine: No cervical spine fracture or traumatic malalignment.    PT/OT    Hypertension  Assessment & Plan  Initially elevated upon admission.  Previously on 10 mg lisinopril.  Per patient and wife, not on any hypertension medication at this time.    CAD (coronary artery disease)  Assessment & Plan  Home medication of Brilanta 60 mg BID.     Continue home medication.    Hypothyroidism  Assessment & Plan  Home medication levothyroxine 125 mcg daily. TSH 0.293.    Repeat T4 pending   Decrease home dose to levothyroxine 100 mcg daily.  Recommend patient to monitor outpatient and repeat TSH with T4 in 6 to 8 weeks        Subjective:   Pt seen and examined at bedside. This morning patient was tired. Denies chest pain, SOB, pain or any other complaints at this time.     Objective:     Vitals: Blood pressure 143/70, pulse (!) 108, temperature 99.5 °F (37.5 °C), resp. rate 18, height 6' 1\" (1.854 m), weight 80.4 kg (177 lb 2.2 oz), SpO2 90%.,Body mass index is 23.37 kg/m².  Wt Readings from Last 3 Encounters:   08/22/24 80.4 kg (177 lb 2.2 oz)   08/20/24 86.1 kg (189 lb 13.1 oz)       Intake/Output Summary (Last 24 hours) at 8/22/2024 0857  Last data filed at 8/22/2024 0612  Gross per 24 hour   Intake 450 ml   Output 300 ml   Net 150 ml       Physical Exam  Constitutional:       " Appearance: Normal appearance.   HENT:      Head: Normocephalic and atraumatic.      Mouth/Throat:      Mouth: Mucous membranes are moist.   Eyes:      General:         Right eye: No discharge.         Left eye: No discharge.      Conjunctiva/sclera: Conjunctivae normal.   Cardiovascular:      Rate and Rhythm: Normal rate and regular rhythm.      Pulses: Normal pulses.      Heart sounds: Normal heart sounds.   Pulmonary:      Effort: Pulmonary effort is normal. No respiratory distress.      Breath sounds: Normal breath sounds.   Abdominal:      General: Bowel sounds are normal.      Palpations: Abdomen is soft.      Tenderness: There is no abdominal tenderness.   Musculoskeletal:      Cervical back: Neck supple.      Right lower leg: No edema.      Left lower leg: No edema.   Skin:     General: Skin is warm and dry.      Capillary Refill: Capillary refill takes less than 2 seconds.   Neurological:      Mental Status: He is alert.      Comments: Resting tremors          Recent Results (from the past 24 hour(s))   Blood culture    Collection Time: 08/21/24  6:57 PM    Specimen: Arm, Left; Blood   Result Value Ref Range    Blood Culture Received in Microbiology Lab. Culture in Progress.    Comprehensive metabolic panel    Collection Time: 08/22/24  5:47 AM   Result Value Ref Range    Sodium 137 135 - 147 mmol/L    Potassium 3.9 3.5 - 5.3 mmol/L    Chloride 107 96 - 108 mmol/L    CO2 22 21 - 32 mmol/L    ANION GAP 8 4 - 13 mmol/L    BUN 19 5 - 25 mg/dL    Creatinine 0.77 0.60 - 1.30 mg/dL    Glucose 137 65 - 140 mg/dL    Calcium 8.0 (L) 8.4 - 10.2 mg/dL    Corrected Calcium 8.6 8.3 - 10.1 mg/dL    AST 16 13 - 39 U/L    ALT 15 7 - 52 U/L    Alkaline Phosphatase 33 (L) 34 - 104 U/L    Total Protein 5.5 (L) 6.4 - 8.4 g/dL    Albumin 3.3 (L) 3.5 - 5.0 g/dL    Total Bilirubin 0.86 0.20 - 1.00 mg/dL    eGFR 81 ml/min/1.73sq m   CBC and differential    Collection Time: 08/22/24  5:47 AM   Result Value Ref Range    WBC 5.53  4.31 - 10.16 Thousand/uL    RBC 4.37 3.88 - 5.62 Million/uL    Hemoglobin 12.8 12.0 - 17.0 g/dL    Hematocrit 39.8 36.5 - 49.3 %    MCV 91 82 - 98 fL    MCH 29.3 26.8 - 34.3 pg    MCHC 32.2 31.4 - 37.4 g/dL    RDW 12.6 11.6 - 15.1 %    MPV 11.8 8.9 - 12.7 fL    Platelets 96 (L) 149 - 390 Thousands/uL    nRBC 0 /100 WBCs    Segmented % 90 (H) 43 - 75 %    Immature Grans % 0 0 - 2 %    Lymphocytes % 3 (L) 14 - 44 %    Monocytes % 7 4 - 12 %    Eosinophils Relative 0 0 - 6 %    Basophils Relative 0 0 - 1 %    Absolute Neutrophils 4.94 1.85 - 7.62 Thousands/µL    Absolute Immature Grans 0.01 0.00 - 0.20 Thousand/uL    Absolute Lymphocytes 0.14 (L) 0.60 - 4.47 Thousands/µL    Absolute Monocytes 0.41 0.17 - 1.22 Thousand/µL    Eosinophils Absolute 0.01 0.00 - 0.61 Thousand/µL    Basophils Absolute 0.02 0.00 - 0.10 Thousands/µL   Magnesium    Collection Time: 08/22/24  5:47 AM   Result Value Ref Range    Magnesium 1.8 (L) 1.9 - 2.7 mg/dL   Smear Review(Phlebs Do Not Order)    Collection Time: 08/22/24  5:47 AM   Result Value Ref Range    RBC Morphology Normal     Platelet Estimate Decreased (A) Adequate    Large Platelet Present        Current Facility-Administered Medications   Medication Dose Route Frequency Provider Last Rate Last Admin    acetaminophen (TYLENOL) tablet 650 mg  650 mg Oral Q6H PRN Tevin Oneil MD   650 mg at 08/22/24 0551    cefTRIAXone (ROCEPHIN) IVPB (premix in dextrose) 1,000 mg 50 mL  1,000 mg Intravenous Q24H Sloane Olson  mL/hr at 08/21/24 2021 1,000 mg at 08/21/24 2021    enoxaparin (LOVENOX) subcutaneous injection 40 mg  40 mg Subcutaneous Daily Tevin Oneil MD   40 mg at 08/21/24 0908    guaiFENesin (MUCINEX) 12 hr tablet 600 mg  600 mg Oral Q12H [facility] Tevin Oneil MD   600 mg at 08/21/24 2022    guaiFENesin (ROBITUSSIN) oral liquid 200 mg  200 mg Oral Q4H PRN Tevin Oneil MD   200 mg at 08/22/24 0560    levothyroxine tablet 100 mcg  100 mcg Oral Early Morning Tevin  MD Thad   100 mcg at 08/22/24 0531    lisinopril (ZESTRIL) tablet 2.5 mg  2.5 mg Oral Daily Sloane Olson DO   2.5 mg at 08/21/24 2051    magnesium sulfate 2 g/50 mL IVPB (premix) 2 g  2 g Intravenous Once Tevin Oneil MD        phenol (CHLORASEPTIC) 1.4 % mucosal liquid 1 spray  1 spray Mouth/Throat Q2H PRN Vanesa Benitez MD   1 spray at 08/21/24 1218    polyethylene glycol (MIRALAX) packet 17 g  17 g Oral Daily PRN Tevin Oneil MD        remdesivir (Veklury) 100 mg in sodium chloride 0.9 % 270 mL IVPB  100 mg Intravenous Q24H Tevin Oneil MD   100 mg at 08/21/24 1634    rOPINIRole (REQUIP) tablet 1 mg  1 mg Oral TID Tevin Oneil MD   1 mg at 08/21/24 2022    ticagrelor tablet 60 mg  60 mg Oral Q12H SAM Tevin Oneil MD   60 mg at 08/21/24 2022       Invasive Devices       Peripheral Intravenous Line  Duration             Peripheral IV 08/20/24 Right Antecubital 1 day              Drain  Duration             External Urinary Catheter 2 days                    Lab, Imaging and other studies: I have personally reviewed pertinent reports.    VTE Pharmacologic Prophylaxis: Enoxaparin (Lovenox)  VTE Mechanical Prophylaxis: sequential compression device    Tevin Oneil MD

## 2024-08-22 NOTE — CASE MANAGEMENT
Case Management Assessment & Discharge Planning Note    Patient name Pavan Edward  Location [address] [address]/[address] [place] [address] MRN [id number]  : 1935 Date 2024       Current Admission Date: 2024  Current Admission Diagnosis:COVID   Patient Active Problem List    Diagnosis Date Noted Date Diagnosed    Positive D dimer 2024     Paroxysmal atrial fibrillation (HCC) 2024     Positive blood culture 2024     Hypothyroidism 2024     CAD (coronary artery disease) 2024     Hypertension 2024     COVID 2024     Weakness 2024     Ambulatory dysfunction 2024     Aneurysm of common iliac artery (HCC) 2024     Renal cyst, left 2024     Parkinson's disease 2024       LOS (days): 2  Geometric Mean LOS (GMLOS) (days): 2.5  Days to GMLOS:0.5     OBJECTIVE:    Risk of Unplanned Readmission Score: 10.88       Current admission status: Inpatient  Referral Reason: Other (Discharge planning)    Preferred Pharmacy:   [facility] [address] [place] [address] - [address]  [address]  [facility] [address]  Phone: [phone] Fax: [phone]    Primary Care Provider: No primary care provider on file.    Primary Insurance: A[employer] MC REP  Secondary Insurance:     ASSESSMENT:  Active Health Care Proxies    There are no active Health Care Proxies on file.          Readmission Root Cause  30 Day Readmission: No    Patient Information  Admitted from:: Home  Mental Status: Alert  During Assessment patient was accompanied by: Spouse  Assessment information provided by:: Spouse  Primary Caregiver: Spouse  Caregiver's Name:: Billigene Wood (spouse)  Caregiver's Relationship to Patient:: Family Member  Caregiver's Telephone Number:: [phone]  Support Systems: Spouse/significant other, Son, Family members  County of Residence: [place]  What city do you live in?: [place]  Home entry access options. Select all that apply.: No steps to enter home  Type of  Current Residence: Apartment  Floor Level: 1  Upon entering residence, is there a bedroom on the main floor (no further steps)?: Yes  Upon entering residence, is there a bathroom on the main floor (no further steps)?: Yes  Living Arrangements: Lives w/ Spouse/significant other  Is patient a ?: No    Activities of Daily Living Prior to Admission  Functional Status: Independent  Completes ADLs independently?: Yes  Ambulates independently?: Yes  Does patient use assisted devices?: Yes  Assisted Devices (DME) used: Walker, Rollator  Does patient currently own DME?: Yes  What DME does the patient currently own?: Rollator, Straight Cane, Walker  Does the patient have a history of Short-Term Rehab?: Yes (in [place])  Does patient have a history of HHC?: Yes ([facility])  Does patient currently have HHC?: Yes    Current Home Health Care  Type of Current Home Care Services: Home PT, Home OT  Current Home Health Agency:: [facility]  Current Home Health Follow-Up Provider:: PCP    Patient Information Continued  Income Source: Pension/assisted  Does patient have prescription coverage?: Yes  Does patient receive dialysis treatments?: No    Means of Transportation  Means of Transport to Appts:: Family transport      Social Determinants of Health (SDOH)      Flowsheet Row Most Recent Value   Housing Stability    In the last 12 months, was there a time when you were not able to pay the mortgage or rent on time? N   In the past 12 months, how many times have you moved where you were living? 1   At any time in the past 12 months, were you homeless or living in a shelter (including now)? N   Transportation Needs    In the past 12 months, has lack of transportation kept you from medical appointments or from getting medications? no   In the past 12 months, has lack of transportation kept you from meetings, work, or from getting things needed for daily living? No   Food Insecurity    Within the past 12 months, you  worried that your food would run out before you got the money to buy more. Never true   Within the past 12 months, the food you bought just didn't last and you didn't have money to get more. Never true   Utilities    In the past 12 months has the electric, gas, oil, or water company threatened to shut off services in your home? No            DISCHARGE DETAILS:    Discharge planning discussed with:: Spouse Billigene Wood  Freedom of Choice: Yes    Comments - Freedom of Choice: SW spoke with spouse Billigene outside patient's room to introduce role of CM, conduct assessment and discuss discharge planning.  Patient recently moved from [place] with his wife to be closer to family.  Son Isai lives in [facility] and another son lives in [place].      Patient is assessed at Level II for rehabilitation needs and STR is recommended.  Billigene expressed preference for a facility as close to their home as possible.  Referral was placed in [facility] and SW will follow for bed availability.      CM contacted family/caregiver?: Yes  Were Treatment Team discharge recommendations reviewed with patient/caregiver?: Yes  Did patient/caregiver verbalize understanding of patient care needs?: Yes  Were patient/caregiver advised of the risks associated with not following Treatment Team discharge recommendations?: Yes    Contacts  Patient Contacts: Billigene Wood (spouse)  Relationship to Patient:: Family  Contact Method: In Person  Reason/Outcome: Emergency Contact, Referral, Discharge Planning, Continuity of Care    Requested Home Health Care         Is the patient interested in HHC at discharge?: No    DME Referral Provided  Referral made for DME?: No    Other Referral/Resources/Interventions Provided:  Interventions: Short Term Rehab    Would you like to participate in our [facility] service program?  : No - Declined    Treatment Team Recommendation: Short Term Rehab  Discharge Destination Plan:: Short Term  Rehab  Transport at Discharge : Wheelchair van         IMM Given (Date):: 08/22/24  IMM Given to:: Family (IMM reviewed with and signed by wife Billigene.  Wife declined copy (has previous copy) and copy placed in scan bin for chart.)

## 2024-08-22 NOTE — CASE MANAGEMENT
[facility] has received APPROVED authorization.  Insurance:   [facility]  Authorization received for: SNF  Facility: [facility]    Authorization #: [id number]   Start of Care: 8/23  Next Review Date: 8/27  Continued Stay Care Coordinator: none given  Submit next review to: H & CC Portal or [phone]     Care Manager notified: Mary Fernández    Please reach out to CM for updates on any clinical information.

## 2024-08-22 NOTE — CASE MANAGEMENT
[facility] received request for authorization from Care Manager.  Authorization request submitted for: [facility]  Facility Name:  [facility]  NPI: [id number]  Facility MD:  Dr. Zepeda   NPI: [id number]  Authorization initiated by contacting insurance:  [employer]  Via: H&CC Portal   Clinicals submitted via Portal attachment   Pending Reference #: [id number]     Care Manager notified: Mary Fernández    Updates to authorization status will be noted in chart. Please reach out to CM for updates on any clinical information.

## 2024-08-22 NOTE — PHYSICAL THERAPY NOTE
"   PT TREATMENT     08/22/24 1040   PT Last Visit   PT Visit Date 08/22/24   Note Type   Note Type Treatment   Pain Assessment   Pain Assessment Tool 0-10   Pain Score No Pain   Restrictions/Precautions   Other Precautions Airborne/isolation;Contact/isolation;Cognitive;Chair Alarm;Bed Alarm;Fall Risk   General   Chart Reviewed Yes   Cognition   Arousal/Participation Arousable   Attention Difficulty attending to directions   Orientation Level Oriented to person   Following Commands Follows one step commands with increased time or repetition   Subjective   Subjective \"I feel great\"   Bed Mobility   Supine to Sit 2  Maximal assistance   Transfers   Sit to Stand 2  Maximal assistance with moderate verbal cues to stand up and not sit   Additional items   (x 3 trials with each time pt able to stand longer from 5 seconds to 30 seconds)   Stand to Sit 2  Maximal assistance   Additional items   (several attempts needed to stand, pt sits impuslively)   Stand pivot 2  Maximal assistance   Additional items   (with walker; max verbal and tactile cues)   Balance   Static Sitting Fair   Static Standing Fair -   Ambulatory Poor   Activity Tolerance   Activity Tolerance Patient limited by fatigue   Assessment   Prognosis Good   Problem List Decreased strength;Impaired balance;Decreased mobility;Decreased coordination   Assessment Pt demonstrates slowly improving mobility and function with diagnosis of COVID and history of Parkinsons Disease. Pt was able to stand several times and transfer to a chair with a walker with max assist x1 and moderate verbal and tactile cues.  Pt was too weak to walk today.  Pt to continue to encourage OOB and progress to ambulation as able.    The patient's AM-[facility] Basic Mobility Inpatient Short Form Raw Score is 10. A Raw score of less than or equal to 16 suggests the patient may benefit from discharge to post-acute rehabilitation services. Please also refer to the recommendation of the Physical Therapist " for safe discharge planning.         Plan   Treatment/Interventions Functional transfer training;LE strengthening/ROM;Therapeutic exercise;Endurance training;Gait training;Bed mobility;Equipment eval/education;Patient/family training;Cognitive reorientation   Progress Progressing toward goals   PT Frequency Other (Comment)  (5x/week)   Discharge Recommendation   Rehab Resource Intensity Level, PT II (Moderate Resource Intensity)   AM-PAC Basic Mobility Inpatient   Turning in Flat Bed Without Bedrails 2   Lying on Back to Sitting on Edge of Flat Bed Without Bedrails 2   Moving Bed to Chair 2   Standing Up From Chair Using Arms 2   Walk in Room 1   Climb 3-5 Stairs With Railing 1   Basic Mobility Inpatient Raw Score 10   [facility] Highest Level Of Mobility   -HL Goal 4: Move to chair/commode   -[facility] Achieved 4: Move to chair/commode   End of Consult   Patient Position at End of Consult All needs within reach;Bed/Chair alarm activated;Bedside chair   Licensure   [place] License Number  Melyssa Martinez PT [id number]

## 2024-08-22 NOTE — CASE MANAGEMENT
Case Management Discharge Planning Note    Patient name Pavan Edward  [facility] [address] [address]4 [place] [address]-* MRN [id number]  : 1935 Date 2024       Current Admission Date: 2024  Current Admission Diagnosis:COVID   Patient Active Problem List    Diagnosis Date Noted Date Diagnosed    Positive D dimer 2024     Paroxysmal atrial fibrillation (HCC) 2024     Positive blood culture 2024     Hypothyroidism 2024     CAD (coronary artery disease) 2024     Hypertension 2024     COVID 2024     Weakness 2024     Ambulatory dysfunction 2024     Aneurysm of common iliac artery (HCC) 2024     Renal cyst, left 2024     Parkinson's disease 2024       LOS (days): 2  Geometric Mean LOS (GMLOS) (days): 2.5  Days to GMLOS:0.4     OBJECTIVE:  Risk of Unplanned Readmission Score: 10.7         Current admission status: Inpatient   Preferred Pharmacy:   [facility] [address] [place] [address] - [address]  [address]  [facility] [address]  Phone: [phone] Fax: [phone]    Primary Care Provider: No primary care provider on file.    Primary Insurance: A[employer] REP  Secondary Insurance:     DISCHARGE DETAILS:                                                                                                               Facility Insurance Auth Number: [id number]

## 2024-08-22 NOTE — PROGRESS NOTES
Patient:  CASTRO MCDERMOTT    MRN:  [id number]    [facility] Request ID:  [id number]    Level of care reserved:  Skilled Nursing Facility    Partner Reserved:  [address] [phone]    Clinical needs requested:    Geography searched:  10 miles around [address]    Start of Service:    Request sent:  11:55am EDT on 8/22/2024 by Mary Fernández    Partner reserved:  3:33pm EDT on 8/22/2024 by Mary Fernández    Choice list shared:

## 2024-08-22 NOTE — PLAN OF CARE
Problem: PAIN - ADULT  Goal: Verbalizes/displays adequate comfort level or baseline comfort level  Description: Interventions:  - Encourage patient to monitor pain and request assistance  - Assess pain using appropriate pain scale  - Administer analgesics based on type and severity of pain and evaluate response  - Implement non-pharmacological measures as appropriate and evaluate response  - Consider cultural and social influences on pain and pain management  - Notify physician/advanced practitioner if interventions unsuccessful or patient reports new pain  Outcome: Progressing     Problem: INFECTION - ADULT  Goal: Absence or prevention of progression during hospitalization  Description: INTERVENTIONS:  - Assess and monitor for signs and symptoms of infection  - Monitor lab/diagnostic results  - Monitor all insertion sites, i.e. indwelling lines, tubes, and drains  - Monitor endotracheal if appropriate and nasal secretions for changes in amount and color  - [place] appropriate cooling/warming therapies per order  - Administer medications as ordered  - Instruct and encourage patient and family to use good hand hygiene technique  - Identify and instruct in appropriate isolation precautions for identified infection/condition  Outcome: Progressing     Problem: SAFETY ADULT  Goal: Patient will remain free of falls  Description: INTERVENTIONS:  - Educate patient/family on patient safety including physical limitations  - Instruct patient to call for assistance with activity   - Consult OT/PT to assist with strengthening/mobility   - Keep Call bell within reach  - Keep bed low and locked with side rails adjusted as appropriate  - Keep care items and personal belongings within reach  - Initiate and maintain comfort rounds  - Make Fall Risk Sign visible to staff  - Offer Toileting every 2 Hours, in advance of need  - Initiate/Maintain bEDalarm  - Obtain necessary fall risk management equipmen kendra  - Apply yellow socks  and bracelet for high fall risk patients  - Consider moving patient to room near nurses station  Outcome: Progressing     Problem: RESPIRATORY - ADULT  Goal: Achieves optimal ventilation and oxygenation  Description: INTERVENTIONS:  - Assess for changes in respiratory status  - Assess for changes in mentation and behavior  - Position to facilitate oxygenation and minimize respiratory effort  - Oxygen administered by appropriate delivery if ordered  - Initiate smoking cessation education as indicated  - Encourage broncho-pulmonary hygiene including cough, deep breathe, Incentive Spirometry  - Assess the need for suctioning and aspirate as needed  - Assess and instruct to report SOB or any respiratory difficulty  - Respiratory Therapy support as indicated  Outcome: Progressing     Problem: Prexisting or High Potential for Compromised Skin Integrity  Goal: Skin integrity is maintained or improved  Description: INTERVENTIONS:  - Identify patients at risk for skin breakdown  - Assess and monitor skin integrity  - Assess and monitor nutrition and hydration status  - Monitor labs   - Assess for incontinence   - Turn and reposition patient  - Assist with mobility/ambulation  - Relieve pressure over bony prominences  - Avoid friction and shearing  - Provide appropriate hygiene as needed including keeping skin clean and dry  - Evaluate need for skin moisturizer/barrier cream  - Collaborate with interdisciplinary team   - Patient/family teaching  - Consider wound care consult   Outcome: Progressing

## 2024-08-23 PROBLEM — E53.8 B12 DEFICIENCY: Status: ACTIVE | Noted: 2024-08-23

## 2024-08-23 LAB
ALBUMIN SERPL BCG-MCNC: 3 G/DL (ref 3.5–5)
ALP SERPL-CCNC: 28 U/L (ref 34–104)
ALT SERPL W P-5'-P-CCNC: 13 U/L (ref 7–52)
ANION GAP SERPL CALCULATED.3IONS-SCNC: 4 MMOL/L (ref 4–13)
AST SERPL W P-5'-P-CCNC: 14 U/L (ref 13–39)
BACTERIA BLD CULT: ABNORMAL
BACTERIA BLD CULT: ABNORMAL
BASOPHILS # BLD AUTO: 0.01 THOUSANDS/ÂΜL (ref 0–0.1)
BASOPHILS NFR BLD AUTO: 0 % (ref 0–1)
BILIRUB SERPL-MCNC: 0.55 MG/DL (ref 0.2–1)
BUN SERPL-MCNC: 25 MG/DL (ref 5–25)
CALCIUM ALBUM COR SERPL-MCNC: 8.6 MG/DL (ref 8.3–10.1)
CALCIUM SERPL-MCNC: 7.8 MG/DL (ref 8.4–10.2)
CHLORIDE SERPL-SCNC: 110 MMOL/L (ref 96–108)
CO2 SERPL-SCNC: 24 MMOL/L (ref 21–32)
CREAT SERPL-MCNC: 0.94 MG/DL (ref 0.6–1.3)
CRP SERPL QL: 144.4 MG/L
D DIMER PPP FEU-MCNC: 1.19 UG/ML FEU
EOSINOPHIL # BLD AUTO: 0.01 THOUSAND/ÂΜL (ref 0–0.61)
EOSINOPHIL NFR BLD AUTO: 0 % (ref 0–6)
ERYTHROCYTE [DISTWIDTH] IN BLOOD BY AUTOMATED COUNT: 12.9 % (ref 11.6–15.1)
GFR SERPL CREATININE-BSD FRML MDRD: 72 ML/MIN/1.73SQ M
GLUCOSE SERPL-MCNC: 96 MG/DL (ref 65–140)
GRAM STN SPEC: ABNORMAL
HCT VFR BLD AUTO: 37.9 % (ref 36.5–49.3)
HGB BLD-MCNC: 12.5 G/DL (ref 12–17)
IMM GRANULOCYTES # BLD AUTO: 0.01 THOUSAND/UL (ref 0–0.2)
IMM GRANULOCYTES NFR BLD AUTO: 0 % (ref 0–2)
LYMPHOCYTES # BLD AUTO: 0.49 THOUSANDS/ÂΜL (ref 0.6–4.47)
LYMPHOCYTES NFR BLD AUTO: 11 % (ref 14–44)
MAGNESIUM SERPL-MCNC: 2.3 MG/DL (ref 1.9–2.7)
MCH RBC QN AUTO: 29.8 PG (ref 26.8–34.3)
MCHC RBC AUTO-ENTMCNC: 33 G/DL (ref 31.4–37.4)
MCV RBC AUTO: 90 FL (ref 82–98)
MONOCYTES # BLD AUTO: 0.49 THOUSAND/ÂΜL (ref 0.17–1.22)
MONOCYTES NFR BLD AUTO: 11 % (ref 4–12)
NEUTROPHILS # BLD AUTO: 3.46 THOUSANDS/ÂΜL (ref 1.85–7.62)
NEUTS SEG NFR BLD AUTO: 78 % (ref 43–75)
NRBC BLD AUTO-RTO: 0 /100 WBCS
PLATELET # BLD AUTO: 123 THOUSANDS/UL (ref 149–390)
PMV BLD AUTO: 12.1 FL (ref 8.9–12.7)
POTASSIUM SERPL-SCNC: 4.4 MMOL/L (ref 3.5–5.3)
PROT SERPL-MCNC: 5.1 G/DL (ref 6.4–8.4)
RBC # BLD AUTO: 4.2 MILLION/UL (ref 3.88–5.62)
SODIUM SERPL-SCNC: 138 MMOL/L (ref 135–147)
STREPTOCOCCUS DNA BLD POS NAA+NON-PROBE: DETECTED
WBC # BLD AUTO: 4.47 THOUSAND/UL (ref 4.31–10.16)

## 2024-08-23 PROCEDURE — 85025 COMPLETE CBC W/AUTO DIFF WBC: CPT

## 2024-08-23 PROCEDURE — 83735 ASSAY OF MAGNESIUM: CPT

## 2024-08-23 PROCEDURE — 80053 COMPREHEN METABOLIC PANEL: CPT

## 2024-08-23 PROCEDURE — 86140 C-REACTIVE PROTEIN: CPT

## 2024-08-23 PROCEDURE — 85379 FIBRIN DEGRADATION QUANT: CPT

## 2024-08-23 PROCEDURE — 99232 SBSQ HOSP IP/OBS MODERATE 35: CPT | Performed by: INTERNAL MEDICINE

## 2024-08-23 RX ORDER — SODIUM CHLORIDE, SODIUM GLUCONATE, SODIUM ACETATE, POTASSIUM CHLORIDE, MAGNESIUM CHLORIDE, SODIUM PHOSPHATE, DIBASIC, AND POTASSIUM PHOSPHATE .53; .5; .37; .037; .03; .012; .00082 G/100ML; G/100ML; G/100ML; G/100ML; G/100ML; G/100ML; G/100ML
75 INJECTION, SOLUTION INTRAVENOUS CONTINUOUS
Status: DISCONTINUED | OUTPATIENT
Start: 2024-08-23 | End: 2024-08-23

## 2024-08-23 RX ADMIN — LISINOPRIL 2.5 MG: 2.5 TABLET ORAL at 08:41

## 2024-08-23 RX ADMIN — SODIUM CHLORIDE, SODIUM GLUCONATE, SODIUM ACETATE, POTASSIUM CHLORIDE, MAGNESIUM CHLORIDE, SODIUM PHOSPHATE, DIBASIC, AND POTASSIUM PHOSPHATE 75 ML/HR: .53; .5; .37; .037; .03; .012; .00082 INJECTION, SOLUTION INTRAVENOUS at 10:13

## 2024-08-23 RX ADMIN — SODIUM CHLORIDE, SODIUM GLUCONATE, SODIUM ACETATE, POTASSIUM CHLORIDE, MAGNESIUM CHLORIDE, SODIUM PHOSPHATE, DIBASIC, AND POTASSIUM PHOSPHATE 75 ML/HR: .53; .5; .37; .037; .03; .012; .00082 INJECTION, SOLUTION INTRAVENOUS at 12:48

## 2024-08-23 RX ADMIN — TICAGRELOR 60 MG: 60 TABLET ORAL at 08:42

## 2024-08-23 RX ADMIN — TICAGRELOR 60 MG: 60 TABLET ORAL at 21:08

## 2024-08-23 RX ADMIN — ROPINIROLE 1 MG: 1 TABLET, FILM COATED ORAL at 16:43

## 2024-08-23 RX ADMIN — ENOXAPARIN SODIUM 40 MG: 40 INJECTION SUBCUTANEOUS at 08:41

## 2024-08-23 RX ADMIN — ROPINIROLE 1 MG: 1 TABLET, FILM COATED ORAL at 08:41

## 2024-08-23 RX ADMIN — CEFTRIAXONE 1000 MG: 1 INJECTION, SOLUTION INTRAVENOUS at 19:54

## 2024-08-23 RX ADMIN — GUAIFENESIN 600 MG: 600 TABLET, EXTENDED RELEASE ORAL at 21:08

## 2024-08-23 RX ADMIN — APIXABAN 5 MG: 5 TABLET, FILM COATED ORAL at 17:03

## 2024-08-23 RX ADMIN — ROPINIROLE 1 MG: 1 TABLET, FILM COATED ORAL at 21:08

## 2024-08-23 RX ADMIN — LEVOTHYROXINE SODIUM 100 MCG: 100 TABLET ORAL at 05:12

## 2024-08-23 RX ADMIN — GUAIFENESIN 600 MG: 600 TABLET, EXTENDED RELEASE ORAL at 08:41

## 2024-08-23 NOTE — PLAN OF CARE
Problem: PAIN - ADULT  Goal: Verbalizes/displays adequate comfort level or baseline comfort level  Description: Interventions:  - Encourage patient to monitor pain and request assistance  - Assess pain using appropriate pain scale  - Administer analgesics based on type and severity of pain and evaluate response  - Implement non-pharmacological measures as appropriate and evaluate response  - Consider cultural and social influences on pain and pain management  - Notify physician/advanced practitioner if interventions unsuccessful or patient reports new pain  Outcome: Progressing     Problem: INFECTION - ADULT  Goal: Absence or prevention of progression during hospitalization  Description: INTERVENTIONS:  - Assess and monitor for signs and symptoms of infection  - Monitor lab/diagnostic results  - Monitor all insertion sites, i.e. indwelling lines, tubes, and drains  - Monitor endotracheal if appropriate and nasal secretions for changes in amount and color  - [place] appropriate cooling/warming therapies per order  - Administer medications as ordered  - Instruct and encourage patient and family to use good hand hygiene technique  - Identify and instruct in appropriate isolation precautions for identified infection/condition  Outcome: Progressing     Problem: SAFETY ADULT  Goal: Patient will remain free of falls  Description: INTERVENTIONS:  - Educate patient/family on patient safety including physical limitations  - Instruct patient to call for assistance with activity   - Consult OT/PT to assist with strengthening/mobility   - Keep Call bell within reach  - Keep bed low and locked with side rails adjusted as appropriate  - Keep care items and personal belongings within reach  - Initiate and maintain comfort rounds  - Make Fall Risk Sign visible to staff  - Offer Toileting every 2 Hours, in advance of need  - Initiate/Maintain bEDalarm  - Obtain necessary fall risk management equipmen kendra  - Apply yellow socks  and bracelet for high fall risk patients  - Consider moving patient to room near nurses station  Outcome: Progressing     Problem: RESPIRATORY - ADULT  Goal: Achieves optimal ventilation and oxygenation  Description: INTERVENTIONS:  - Assess for changes in respiratory status  - Assess for changes in mentation and behavior  - Position to facilitate oxygenation and minimize respiratory effort  - Oxygen administered by appropriate delivery if ordered  - Initiate smoking cessation education as indicated  - Encourage broncho-pulmonary hygiene including cough, deep breathe, Incentive Spirometry  - Assess the need for suctioning and aspirate as needed  - Assess and instruct to report SOB or any respiratory difficulty  - Respiratory Therapy support as indicated  Outcome: Progressing     Problem: Prexisting or High Potential for Compromised Skin Integrity  Goal: Skin integrity is maintained or improved  Description: INTERVENTIONS:  - Identify patients at risk for skin breakdown  - Assess and monitor skin integrity  - Assess and monitor nutrition and hydration status  - Monitor labs   - Assess for incontinence   - Turn and reposition patient  - Assist with mobility/ambulation  - Relieve pressure over bony prominences  - Avoid friction and shearing  - Provide appropriate hygiene as needed including keeping skin clean and dry  - Evaluate need for skin moisturizer/barrier cream  - Collaborate with interdisciplinary team   - Patient/family teaching  - Consider wound care consult   Outcome: Progressing

## 2024-08-23 NOTE — PLAN OF CARE
Problem: PAIN - ADULT  Goal: Verbalizes/displays adequate comfort level or baseline comfort level  Description: Interventions:  - Encourage patient to monitor pain and request assistance  - Assess pain using appropriate pain scale  - Administer analgesics based on type and severity of pain and evaluate response  - Implement non-pharmacological measures as appropriate and evaluate response  - Consider cultural and social influences on pain and pain management  - Notify physician/advanced practitioner if interventions unsuccessful or patient reports new pain  Outcome: Progressing     Problem: INFECTION - ADULT  Goal: Absence or prevention of progression during hospitalization  Description: INTERVENTIONS:  - Assess and monitor for signs and symptoms of infection  - Monitor lab/diagnostic results  - Monitor all insertion sites, i.e. indwelling lines, tubes, and drains  - Monitor endotracheal if appropriate and nasal secretions for changes in amount and color  - [place] appropriate cooling/warming therapies per order  - Administer medications as ordered  - Instruct and encourage patient and family to use good hand hygiene technique  - Identify and instruct in appropriate isolation precautions for identified infection/condition  Outcome: Progressing     Problem: SAFETY ADULT  Goal: Patient will remain free of falls  Description: INTERVENTIONS:  - Educate patient/family on patient safety including physical limitations  - Instruct patient to call for assistance with activity   - Consult OT/PT to assist with strengthening/mobility   - Keep Call bell within reach  - Keep bed low and locked with side rails adjusted as appropriate  - Keep care items and personal belongings within reach  - Initiate and maintain comfort rounds  - Make Fall Risk Sign visible to staff  - Offer Toileting every 2 Hours, in advance of need  - Initiate/Maintain bEDalarm  - Obtain necessary fall risk management equipmen kendra  - Apply yellow socks  and bracelet for high fall risk patients  - Consider moving patient to room near nurses station  Outcome: Progressing     Problem: RESPIRATORY - ADULT  Goal: Achieves optimal ventilation and oxygenation  Description: INTERVENTIONS:  - Assess for changes in respiratory status  - Assess for changes in mentation and behavior  - Position to facilitate oxygenation and minimize respiratory effort  - Oxygen administered by appropriate delivery if ordered  - Initiate smoking cessation education as indicated  - Encourage broncho-pulmonary hygiene including cough, deep breathe, Incentive Spirometry  - Assess the need for suctioning and aspirate as needed  - Assess and instruct to report SOB or any respiratory difficulty  - Respiratory Therapy support as indicated  Outcome: Progressing     Problem: Prexisting or High Potential for Compromised Skin Integrity  Goal: Skin integrity is maintained or improved  Description: INTERVENTIONS:  - Identify patients at risk for skin breakdown  - Assess and monitor skin integrity  - Assess and monitor nutrition and hydration status  - Monitor labs   - Assess for incontinence   - Turn and reposition patient  - Assist with mobility/ambulation  - Relieve pressure over bony prominences  - Avoid friction and shearing  - Provide appropriate hygiene as needed including keeping skin clean and dry  - Evaluate need for skin moisturizer/barrier cream  - Collaborate with interdisciplinary team   - Patient/family teaching  - Consider wound care consult   Outcome: Progressing

## 2024-08-23 NOTE — PROGRESS NOTES
[facility] Progress Note - Pavan Edward 88 y.o. male MRN: [id number]    Unit/Bed#: [address] [place] [address] Encounter: [id number]      Assessment/Plan:  * COVID  Assessment & Plan  Presented with abdominal pain, back pain, s/p fall, as well as fever 101.0 F prior to arrival.     -COVID +   -WBC 5.11  -Procal 0.12  -LA 0.8  -INR 15.2  -CRP 66>88  -D-dimer 1.01, corrected 0.88    Continue mild COVID pathway   Completed Remdesivir 200 mg on 8/20, followed by 100 mg for 2 doses (8/21-8/22)  Monitor oxygenation; supplement as needed; not requiring any O2 at this moment  Mucinex for congestion   Robitussin every 4 hours scheduled for coughing  Tylenol as needed for pain or fever  See Positive blood culture    Positive blood culture  Assessment & Plan  Blood culture showed Gram positive cocci in pairs, chains and clusters. ID panel shows streptococcus, not Enterococcus, Streptococcus agalactiae, or Streptococcus pyogene.    WBC WNL  CRP 66.5>98.1    Ordered repeat blood culture which has shown NG for 24 hours  If no growth for 48 hours, discontinue ceftriaxone  Continue ceftriaxone daily (8/21-)  Await sensitivities of culture    Weakness  Assessment & Plan  Pt presents with generalized weakness.     Likely secondary to COVID vs. Deconditioning     Continue to treat Covid and see how pt responds  TSH pending  Iron panel pending    PT/OT    Parkinson's disease  Assessment & Plan  Chronic.  Home medication 1 mg Requip 3 times daily.  Per wife, patient and her were not thrilled with past neurologist and has to find new neurologist in the area.    Continue home medication  Continue to follow-up outpatient neurology    B12 deficiency  Assessment & Plan  B-12 noted to be 261.    Continue B12 supplements 1000 mcg daily.    Paroxysmal atrial fibrillation (HCC)  Assessment & Plan  Patient has a history of pAF, previously anticoagulated with Eliquis but noting it was stopped in January secondary to reported allergy- confirmed  with patient's wife. Patient not taking any   EKG in ED: NSR with HR 67     Extensive discussion with patient and his wife about proper anticoagulation for A-fib, CAD and history of strokes.  Risks and benefits discussed and wife and patient agreed to begin Eliquis which was previously recommended by cardiology in December 2023 per chart review.    Eliquis 5 mg twice daily resumed 8/23  Brilinta 60 mg twice daily  Continue to monitor for side effects      Positive D dimer  Assessment & Plan  D dimer = 1.01 - corrected normal = 0.88    CT chest with contrast: Negative for contusion or infiltrate. Lower lobe hypoventilation,  pleural parenchymal scarring,  Small benign calcified granuloma in the left   lower lobe, Small lingular juxtapleural bulla without suspicious features     Low index of suspicion for PE  Continue patient on Brilinta and Eliquis for anticoagulation    Renal cyst, left  Assessment & Plan  CT abdomen showed 1.5 cm left renal hyperdense nodule likely a hemorrhagic or proteinaceous cyst.     Continue to monitor    Aneurysm of common iliac artery (HCC)  Assessment & Plan  Incidental finding 2.4 cm right common iliac artery aneurysm on CT chest/abd/pelvis.     Follow-up outpatient    Ambulatory dysfunction  Assessment & Plan  S/p fall.     CT head: No intracranial hemorrhage or calvarial fracture.  CT spine: No cervical spine fracture or traumatic malalignment.    PT/OT    Hypertension  Assessment & Plan  Initially elevated upon admission.  Previously on 10 mg lisinopril.  Per patient and wife, not on any hypertension medication at this time.    CAD (coronary artery disease)  Assessment & Plan  Home medication of Brilanta 60 mg BID.     Continue home medication.    Hypothyroidism  Assessment & Plan  Home medication levothyroxine 125 mcg daily. TSH 0.293.    Decrease home dose to levothyroxine 100 mcg daily.  Recommend patient to monitor outpatient and repeat TSH with T4 in 6 to 8  "weeks        Subjective:   Patient seen and examined at bedside.  Patient states that he is feeling better.  Patient is complaining of some neck pain.  Patient states that his throat is feeling better.  Denies chest pain, shortness of breath, sore throat, further weakness or any other complaints at this time.    Objective:     Vitals: Blood pressure 127/81, pulse 80, temperature 98.3 °F (36.8 °C), resp. rate 18, height 6' 1\" (1.854 m), weight 79.8 kg (176 lb), SpO2 92%.,Body mass index is 23.22 kg/m².  Wt Readings from Last 3 Encounters:   08/23/24 79.8 kg (176 lb)   08/20/24 86.1 kg (189 lb 13.1 oz)       Intake/Output Summary (Last 24 hours) at 8/23/2024 1638  Last data filed at 8/23/2024 1248  Gross per 24 hour   Intake 478 ml   Output 500 ml   Net -22 ml       Physical Exam  Vitals and nursing note reviewed.   Constitutional:       General: He is not in acute distress.     Appearance: He is well-developed.   HENT:      Head: Normocephalic and atraumatic.   Eyes:      Conjunctiva/sclera: Conjunctivae normal.   Cardiovascular:      Rate and Rhythm: Normal rate and regular rhythm.      Heart sounds: No murmur heard.  Pulmonary:      Effort: Pulmonary effort is normal. No respiratory distress.      Breath sounds: Normal breath sounds.   Abdominal:      Palpations: Abdomen is soft.      Tenderness: There is no abdominal tenderness.   Musculoskeletal:         General: No swelling.      Cervical back: Neck supple.   Skin:     General: Skin is warm and dry.      Capillary Refill: Capillary refill takes less than 2 seconds.   Neurological:      Mental Status: He is alert.      Comments: Resting tremors   Psychiatric:         Mood and Affect: Mood normal.       Recent Results (from the past 24 hour(s))   Comprehensive metabolic panel    Collection Time: 08/23/24  5:12 AM   Result Value Ref Range    Sodium 138 135 - 147 mmol/L    Potassium 4.4 3.5 - 5.3 mmol/L    Chloride 110 (H) 96 - 108 mmol/L    CO2 24 21 - 32 mmol/L    " ANION GAP 4 4 - 13 mmol/L    BUN 25 5 - 25 mg/dL    Creatinine 0.94 0.60 - 1.30 mg/dL    Glucose 96 65 - 140 mg/dL    Calcium 7.8 (L) 8.4 - 10.2 mg/dL    Corrected Calcium 8.6 8.3 - 10.1 mg/dL    AST 14 13 - 39 U/L    ALT 13 7 - 52 U/L    Alkaline Phosphatase 28 (L) 34 - 104 U/L    Total Protein 5.1 (L) 6.4 - 8.4 g/dL    Albumin 3.0 (L) 3.5 - 5.0 g/dL    Total Bilirubin 0.55 0.20 - 1.00 mg/dL    eGFR 72 ml/min/1.73sq m   CBC and differential    Collection Time: 08/23/24  5:12 AM   Result Value Ref Range    WBC 4.47 4.31 - 10.16 Thousand/uL    RBC 4.20 3.88 - 5.62 Million/uL    Hemoglobin 12.5 12.0 - 17.0 g/dL    Hematocrit 37.9 36.5 - 49.3 %    MCV 90 82 - 98 fL    MCH 29.8 26.8 - 34.3 pg    MCHC 33.0 31.4 - 37.4 g/dL    RDW 12.9 11.6 - 15.1 %    MPV 12.1 8.9 - 12.7 fL    Platelets 123 (L) 149 - 390 Thousands/uL    nRBC 0 /100 WBCs    Segmented % 78 (H) 43 - 75 %    Immature Grans % 0 0 - 2 %    Lymphocytes % 11 (L) 14 - 44 %    Monocytes % 11 4 - 12 %    Eosinophils Relative 0 0 - 6 %    Basophils Relative 0 0 - 1 %    Absolute Neutrophils 3.46 1.85 - 7.62 Thousands/µL    Absolute Immature Grans 0.01 0.00 - 0.20 Thousand/uL    Absolute Lymphocytes 0.49 (L) 0.60 - 4.47 Thousands/µL    Absolute Monocytes 0.49 0.17 - 1.22 Thousand/µL    Eosinophils Absolute 0.01 0.00 - 0.61 Thousand/µL    Basophils Absolute 0.01 0.00 - 0.10 Thousands/µL   Magnesium    Collection Time: 08/23/24  5:12 AM   Result Value Ref Range    Magnesium 2.3 1.9 - 2.7 mg/dL   C-reactive protein    Collection Time: 08/23/24  5:12 AM   Result Value Ref Range    .4 (H) <3.0 mg/L   D-dimer, quantitative    Collection Time: 08/23/24  5:12 AM   Result Value Ref Range    D-Dimer, Quant 1.19 (H) <0.50 ug/ml FEU       Current Facility-Administered Medications   Medication Dose Route Frequency Provider Last Rate Last Admin    acetaminophen (TYLENOL) tablet 650 mg  650 mg Oral Q6H PRN Tevin Oneil MD   650 mg at 08/22/24 0551    apixaban (ELIQUIS)  tablet 5 mg  5 mg Oral BID Tevin Oneil MD        cefTRIAXone (ROCEPHIN) IVPB (premix in dextrose) 1,000 mg 50 mL  1,000 mg Intravenous Q24H Sloane Olson  mL/hr at 08/22/24 1931 1,000 mg at 08/22/24 1931    [START ON 8/24/2024] cyanocobalamin (VITAMIN B-12) tablet 1,000 mcg  1,000 mcg Oral Daily Tevin Oneil MD        guaiFENesin (MUCINEX) 12 hr tablet 600 mg  600 mg Oral Q12H [facility] Tevin Oneil MD   600 mg at 08/23/24 0841    guaiFENesin (ROBITUSSIN) oral liquid 200 mg  200 mg Oral Q4H PRN Tevin Oneil MD   200 mg at 08/22/24 0551    levothyroxine tablet 100 mcg  100 mcg Oral Early Morning Tevin Oneil MD   100 mcg at 08/23/24 0512    lisinopril (ZESTRIL) tablet 2.5 mg  2.5 mg Oral Daily Sloane Olson DO   2.5 mg at 08/23/24 0841    phenol (CHLORASEPTIC) 1.4 % mucosal liquid 1 spray  1 spray Mouth/Throat Q2H PRN Vanesa Benitez MD   1 spray at 08/21/24 1218    polyethylene glycol (MIRALAX) packet 17 g  17 g Oral Daily PRN Tevin Oneil MD        rOPINIRole (REQUIP) tablet 1 mg  1 mg Oral TID Tevin Oneil MD   1 mg at 08/23/24 0841    ticagrelor tablet 60 mg  60 mg Oral Q12H [facility] Tevin Oneil MD   60 mg at 08/23/24 0842       Invasive Devices       Peripheral Intravenous Line  Duration             Peripheral IV 08/20/24 Right Antecubital 3 days              Drain  Duration             External Urinary Catheter 1 day                    Lab, Imaging and other studies: I have personally reviewed pertinent reports.    VTE Pharmacologic Prophylaxis: Brilanta and Eliquis   VTE Mechanical Prophylaxis: sequential compression device    Tevin Oneil MD

## 2024-08-24 VITALS
WEIGHT: 175 LBS | HEIGHT: 73 IN | SYSTOLIC BLOOD PRESSURE: 127 MMHG | TEMPERATURE: 97.8 F | DIASTOLIC BLOOD PRESSURE: 76 MMHG | HEART RATE: 74 BPM | RESPIRATION RATE: 18 BRPM | BODY MASS INDEX: 23.19 KG/M2 | OXYGEN SATURATION: 99 %

## 2024-08-24 PROBLEM — K59.09 OTHER CONSTIPATION: Status: ACTIVE | Noted: 2024-08-24

## 2024-08-24 PROBLEM — R78.81 POSITIVE BLOOD CULTURE: Status: RESOLVED | Noted: 2024-08-21 | Resolved: 2024-08-24

## 2024-08-24 PROBLEM — D69.6 THROMBOCYTOPENIA (HCC): Status: ACTIVE | Noted: 2024-08-24

## 2024-08-24 LAB
ALBUMIN SERPL BCG-MCNC: 3 G/DL (ref 3.5–5)
ALP SERPL-CCNC: 27 U/L (ref 34–104)
ALT SERPL W P-5'-P-CCNC: 13 U/L (ref 7–52)
ANION GAP SERPL CALCULATED.3IONS-SCNC: 5 MMOL/L (ref 4–13)
AST SERPL W P-5'-P-CCNC: 14 U/L (ref 13–39)
BASOPHILS # BLD AUTO: 0.02 THOUSANDS/ÂΜL (ref 0–0.1)
BASOPHILS NFR BLD AUTO: 0 % (ref 0–1)
BILIRUB SERPL-MCNC: 0.54 MG/DL (ref 0.2–1)
BUN SERPL-MCNC: 23 MG/DL (ref 5–25)
CALCIUM ALBUM COR SERPL-MCNC: 8.6 MG/DL (ref 8.3–10.1)
CALCIUM SERPL-MCNC: 7.8 MG/DL (ref 8.4–10.2)
CHLORIDE SERPL-SCNC: 109 MMOL/L (ref 96–108)
CO2 SERPL-SCNC: 24 MMOL/L (ref 21–32)
CREAT SERPL-MCNC: 0.92 MG/DL (ref 0.6–1.3)
CRP SERPL QL: 133.3 MG/L
EOSINOPHIL # BLD AUTO: 0.03 THOUSAND/ÂΜL (ref 0–0.61)
EOSINOPHIL NFR BLD AUTO: 1 % (ref 0–6)
ERYTHROCYTE [DISTWIDTH] IN BLOOD BY AUTOMATED COUNT: 12.9 % (ref 11.6–15.1)
GFR SERPL CREATININE-BSD FRML MDRD: 73 ML/MIN/1.73SQ M
GLUCOSE SERPL-MCNC: 88 MG/DL (ref 65–140)
HCT VFR BLD AUTO: 38.8 % (ref 36.5–49.3)
HGB BLD-MCNC: 12.6 G/DL (ref 12–17)
IMM GRANULOCYTES # BLD AUTO: 0.03 THOUSAND/UL (ref 0–0.2)
IMM GRANULOCYTES NFR BLD AUTO: 1 % (ref 0–2)
LYMPHOCYTES # BLD AUTO: 0.64 THOUSANDS/ÂΜL (ref 0.6–4.47)
LYMPHOCYTES NFR BLD AUTO: 13 % (ref 14–44)
MAGNESIUM SERPL-MCNC: 2.3 MG/DL (ref 1.9–2.7)
MCH RBC QN AUTO: 29.3 PG (ref 26.8–34.3)
MCHC RBC AUTO-ENTMCNC: 32.5 G/DL (ref 31.4–37.4)
MCV RBC AUTO: 90 FL (ref 82–98)
MONOCYTES # BLD AUTO: 0.5 THOUSAND/ÂΜL (ref 0.17–1.22)
MONOCYTES NFR BLD AUTO: 10 % (ref 4–12)
NEUTROPHILS # BLD AUTO: 3.57 THOUSANDS/ÂΜL (ref 1.85–7.62)
NEUTS SEG NFR BLD AUTO: 75 % (ref 43–75)
NRBC BLD AUTO-RTO: 0 /100 WBCS
PLATELET # BLD AUTO: 135 THOUSANDS/UL (ref 149–390)
PMV BLD AUTO: 11.9 FL (ref 8.9–12.7)
POTASSIUM SERPL-SCNC: 3.9 MMOL/L (ref 3.5–5.3)
PROT SERPL-MCNC: 5.2 G/DL (ref 6.4–8.4)
RBC # BLD AUTO: 4.3 MILLION/UL (ref 3.88–5.62)
SODIUM SERPL-SCNC: 138 MMOL/L (ref 135–147)
WBC # BLD AUTO: 4.79 THOUSAND/UL (ref 4.31–10.16)

## 2024-08-24 PROCEDURE — 86140 C-REACTIVE PROTEIN: CPT

## 2024-08-24 PROCEDURE — 85025 COMPLETE CBC W/AUTO DIFF WBC: CPT

## 2024-08-24 PROCEDURE — 83735 ASSAY OF MAGNESIUM: CPT

## 2024-08-24 PROCEDURE — 99239 HOSP IP/OBS DSCHRG MGMT >30: CPT | Performed by: INTERNAL MEDICINE

## 2024-08-24 PROCEDURE — 80053 COMPREHEN METABOLIC PANEL: CPT

## 2024-08-24 RX ORDER — POLYETHYLENE GLYCOL 3350 17 G/17G
17 POWDER, FOR SOLUTION ORAL DAILY PRN
Start: 2024-08-24

## 2024-08-24 RX ORDER — LISINOPRIL 2.5 MG/1
2.5 TABLET ORAL DAILY
Start: 2024-08-25

## 2024-08-24 RX ORDER — LEVOTHYROXINE SODIUM 100 UG/1
100 TABLET ORAL
Start: 2024-08-25

## 2024-08-24 RX ORDER — GUAIFENESIN 100 MG/5ML
200 SOLUTION ORAL EVERY 4 HOURS PRN
Start: 2024-08-24

## 2024-08-24 RX ADMIN — TICAGRELOR 60 MG: 60 TABLET ORAL at 09:14

## 2024-08-24 RX ADMIN — ROPINIROLE 1 MG: 1 TABLET, FILM COATED ORAL at 15:27

## 2024-08-24 RX ADMIN — APIXABAN 5 MG: 5 TABLET, FILM COATED ORAL at 09:13

## 2024-08-24 RX ADMIN — ROPINIROLE 1 MG: 1 TABLET, FILM COATED ORAL at 09:13

## 2024-08-24 RX ADMIN — GUAIFENESIN 600 MG: 600 TABLET, EXTENDED RELEASE ORAL at 09:13

## 2024-08-24 RX ADMIN — LEVOTHYROXINE SODIUM 100 MCG: 100 TABLET ORAL at 05:46

## 2024-08-24 RX ADMIN — LISINOPRIL 2.5 MG: 2.5 TABLET ORAL at 09:13

## 2024-08-24 RX ADMIN — BENZOCAINE 1 APPLICATION: 220 GEL, DENTIFRICE DENTAL at 14:22

## 2024-08-24 RX ADMIN — CYANOCOBALAMIN TAB 500 MCG 1000 MCG: 500 TAB at 09:13

## 2024-08-24 NOTE — PLAN OF CARE
Problem: PAIN - ADULT  Goal: Verbalizes/displays adequate comfort level or baseline comfort level  Description: Interventions:  - Encourage patient to monitor pain and request assistance  - Assess pain using appropriate pain scale  - Administer analgesics based on type and severity of pain and evaluate response  - Implement non-pharmacological measures as appropriate and evaluate response  - Consider cultural and social influences on pain and pain management  - Notify physician/advanced practitioner if interventions unsuccessful or patient reports new pain  Outcome: Progressing     Problem: INFECTION - ADULT  Goal: Absence or prevention of progression during hospitalization  Description: INTERVENTIONS:  - Assess and monitor for signs and symptoms of infection  - Monitor lab/diagnostic results  - Monitor all insertion sites, i.e. indwelling lines, tubes, and drains  - Monitor endotracheal if appropriate and nasal secretions for changes in amount and color  - [place] appropriate cooling/warming therapies per order  - Administer medications as ordered  - Instruct and encourage patient and family to use good hand hygiene technique  - Identify and instruct in appropriate isolation precautions for identified infection/condition  Outcome: Progressing     Problem: SAFETY ADULT  Goal: Patient will remain free of falls  Description: INTERVENTIONS:  - Educate patient/family on patient safety including physical limitations  - Instruct patient to call for assistance with activity   - Consult OT/PT to assist with strengthening/mobility   - Keep Call bell within reach  - Keep bed low and locked with side rails adjusted as appropriate  - Keep care items and personal belongings within reach  - Initiate and maintain comfort rounds  - Make Fall Risk Sign visible to staff  - Offer Toileting every 2 Hours, in advance of need  - Initiate/Maintain bEDalarm  - Obtain necessary fall risk management equipmen kendra  - Apply yellow socks  and bracelet for high fall risk patients  - Consider moving patient to room near nurses station  Outcome: Progressing     Problem: RESPIRATORY - ADULT  Goal: Achieves optimal ventilation and oxygenation  Description: INTERVENTIONS:  - Assess for changes in respiratory status  - Assess for changes in mentation and behavior  - Position to facilitate oxygenation and minimize respiratory effort  - Oxygen administered by appropriate delivery if ordered  - Initiate smoking cessation education as indicated  - Encourage broncho-pulmonary hygiene including cough, deep breathe, Incentive Spirometry  - Assess the need for suctioning and aspirate as needed  - Assess and instruct to report SOB or any respiratory difficulty  - Respiratory Therapy support as indicated  Outcome: Progressing     Problem: Prexisting or High Potential for Compromised Skin Integrity  Goal: Skin integrity is maintained or improved  Description: INTERVENTIONS:  - Identify patients at risk for skin breakdown  - Assess and monitor skin integrity  - Assess and monitor nutrition and hydration status  - Monitor labs   - Assess for incontinence   - Turn and reposition patient  - Assist with mobility/ambulation  - Relieve pressure over bony prominences  - Avoid friction and shearing  - Provide appropriate hygiene as needed including keeping skin clean and dry  - Evaluate need for skin moisturizer/barrier cream  - Collaborate with interdisciplinary team   - Patient/family teaching  - Consider wound care consult   Outcome: Progressing

## 2024-08-24 NOTE — NURSING NOTE
Patient discharge to [facility] report given to Charge Nurse Estrellita. Left unit via W/C with transport team.

## 2024-08-24 NOTE — DISCHARGE SUMMARY
Discharge Summary - [facility] Residency    This patient was a Board Hit patient that Pt was not a [facility] patient at the admission and discharge.     Patient Information: Pavan Edward 88 y.o. male MRN: [id number]  Unit/Bed#: [address] [place] [address] Encounter: [id number]     Admitting Physician: Vanesa Benitez MD  Discharging Physician/Practitioner: Vanesa Benitez MD   PCP: No primary care provider on file.  Admission Date:   Admission Orders (From admission, onward)       Ordered        08/20/24 1304  INPATIENT ADMISSION  Once                          Discharge Date: 08/24/24      Reason for Admission: Abdominal pain [R10.9]  Generalized weakness [R53.1]  Ambulatory dysfunction [R26.2]  COVID-19 [U07.1]     Discharge Diagnoses:      Principal Problem:    COVID  Active Problems:    Weakness    Hypothyroidism    CAD (coronary artery disease)    Hypertension    Ambulatory dysfunction    Aneurysm of common iliac artery (HCC)    Renal cyst, left    Parkinson's disease    Positive D dimer    Paroxysmal atrial fibrillation (HCC)    B12 deficiency    Thrombocytopenia (HCC)  Resolved Problems:    Positive blood culture       Consultations During Hospital Stay:  ·       none     Procedures Performed:   ·       none     Significant Findings / Test Results:   8/24: Plt 135, , Bcx NG at 48 hrs        8/23: WBC 4.47, hg 12.5, pl 128, .4, D-Dimer 1.19  BCX: NG at 48H  8/22: WBC 5.53, Hg 12.8, platelets 96,   8/21: WBC 4.86, Hg 12.7, Na 141, K 4.6, Mg 2.1, Procal 0.09, CRP 98.1  UA 30 prot, glucose, ketones 10, urobilinogen, small occult blood, mod mucos threads  8/20: WBC 5.11 Plts 119, K 3.8, Cl 108, BUN 24, Cr 1.00, Hb 12.9, CK 61, LA 0.8, Procal 0.12, INR 15.2, TSH 0.293, T4 WNL  Lipid:  - Vit d WNL, B12 WNL, TIBC abnormal, Ferritin normal  D dimer = 1.01 - corrected normal = 0.88  CRP - 66.5   UA: mucus threads, protein, glucose, ketones, small blood    CT  chest/abd/pelvis: Atherosclerotic disease. 2.4 cm right common iliac artery aneurysm. 1.5 cm left renal hyperdense nodule likely a hemorrhagic or proteinaceous cyst.   CT head: No intracranial hemorrhage or calvarial fracture.  CT spine: No cervical spine fracture or traumatic malalignment.        Incidental Findings:   ·       none      Test Results Pending at Discharge (will require follow up):   ·       none     Outpatient Tests Requested:  ·       CBC in one week, TSH and T4 in 6-8 weeks     Outpatient follow-up Requested:  ·       neurology, vascular surgery, cardiology    Complications:  none    Things to address at first visit after hospitalization   Did you repeat CBC in one week to recheck thrombocytopenia?  Did you get TSH and T4 lab done in 6-8 weeks?   Did you follow up with cardiology, neurology and vascualr surgery?  Anymore fever or chills?  Any cough or SOB?  Did you find new PCP?  Are you taking all your medication, Eliquis, brilinta, levothyroxine, lisinopril and vitamin B-12?      Hospital Course:      Pavan Edward is a 88 y.o. male patient w/ PMH paroxysmal Afib non-compliant on Eliquis, hypothyroidism, CAD on brilinta, hypertension not on any meds, parkinson's disease, common iliac aneurysm  who originally presented to the hospital on 8/20/2024 due to headache, rhinorrhea, chills and cough. Patient states that he just moved from [place] recently to this area. Pt was found to be Covid + and put on Covid mild pathway. Pt never required oxygen and completed Remdesivir 200 mg x1 followed by 100mg x2. Pt was symptom were well controlled with mucinex, robitussin and tylenol. PT/OT recommend STR for his weakness and Pt was discharged to STR for PT/OT. Pt was on ceftriaxone for 3 days due to positive blood culture which turned out to be most likely contamination. Repeat blood culture was negative. Pt was restarted on Eliquis and continued at discharge. Pt's levothyroxine was lowered to 100 mcg due to  low TSH and repeat TSH and T4 lab was ordered in 6-8 weeks. Pt also had acute thrombocytopenia most likely due to COVID and repeat CBC was ordered in one week. Pt was referred to neurology for parkinson's, cardiology for paroxymal Afib and vascular surgery for iliac artery aneurysm. Pt was also restarted on lisinopril (previous medication) on lower dosage of 2.5 mg daily. Pt was also advised to find new PCP in the area. Pt was conitinued on home Brilinta for CAD and Requip for parkinson's.             * COVID  Assessment & Plan  Presented with abdominal pain, back pain, s/p fall, as well as fever 101.0 F prior to arrival.     -COVID +   -WBC 5.11  -Procal 0.12  -LA 0.8  -INR 15.2  -CRP 66>88  -D-dimer 1.01, corrected 0.88    Continue mild COVID pathway   Completed Remdesivir 200 mg on 8/20, followed by 100 mg for 2 doses (8/21-8/22)  Monitor oxygenation; supplement as needed; did not require any O2  Mucinex for congestion during hospital stay   Robitussin every 4 hours PRN continue at Discharge  Tylenol as needed for pain or fever  See Positive blood culture  Pt is feeling much batter at [facility]    Weakness  Assessment & Plan  Pt presents with generalized weakness.     Likely secondary to COVID vs. Deconditioning     Continue to treat Covid and see how pt responds  TSH 0.293, but T4 WNL Pt is on levothyroxine was lowered 100mcg  Iron panel: iron low    PT/OT recommend STR  Discharged to STR for PT/OT    Positive blood culture-resolved as of 8/24/2024  Assessment & Plan  Blood culture showed Gram positive cocci in pairs, chains and clusters. ID panel shows Streptococcus mitis oralis group, not Enterococcus, Streptococcus agalactiae, or Streptococcus pyogene. Streptococcus mitis oralis group is sensitive to ceftriaxone. Most likely contamination.    WBC WNL  CRP 66.5>98.1>144>133 trending down    repeat blood culture NG for 48 hours  discontinued ceftriaxone (8/21-23)    Thrombocytopenia (HCC)  Assessment &  Plan  Plt 131k at admission > 96 >123>135 stable before discharge. Baseline 150-160k  Most likely acute due to Covid infection.    Will repeat CBC in one week to double check     B12 deficiency  Assessment & Plan  B-12 noted to be 261.    Continue B12 supplements 1000 mcg daily.    Paroxysmal atrial fibrillation (HCC)  Assessment & Plan  Patient has a history of pAF, previously anticoagulated with Eliquis but noting it was stopped in January secondary to reported allergy- confirmed with patient's wife. Patient not taking any   EKG in ED: NSR with HR 67     Extensive discussion with patient and his wife about proper anticoagulation for A-fib, CAD and history of strokes.  Risks and benefits discussed and wife and patient agreed to begin Eliquis which was previously recommended by cardiology in December 2023 per chart review.    Eliquis 5 mg twice daily resumed 8/23  Discharged with Eliquis for AC      Positive D dimer  Assessment & Plan  D dimer = 1.01 - corrected normal = 0.88    CT chest with contrast: Negative for contusion or infiltrate. Lower lobe hypoventilation,  pleural parenchymal scarring,  Small benign calcified granuloma in the left   lower lobe, Small lingular juxtapleural bulla without suspicious features     Low index of suspicion for PE  Discharge patient on home Brilinta, and Eliquis for anticoagulation for Proximal Afib    Parkinson's disease  Assessment & Plan  Chronic.  Home medication 1 mg Requip 3 times daily.  Per wife, patient and her were not thrilled with past neurologist and has to find new neurologist in the area.    Continue home medication  Continue to follow-up outpatient neurology  Neurology referral made    Renal cyst, left  Assessment & Plan  CT abdomen showed 1.5 cm left renal hyperdense nodule likely a hemorrhagic or proteinaceous cyst.     Continue to monitor    Aneurysm of common iliac artery (HCC)  Assessment & Plan  History of iliac artery aneurysm, 2.4 cm right common iliac  "artery aneurysm seen on CT chest/abd/pelvis.     Follow-up outpatient  Vascular surgery referral made    Ambulatory dysfunction  Assessment & Plan  S/p fall.     CT head: No intracranial hemorrhage or calvarial fracture.  CT spine: No cervical spine fracture or traumatic malalignment.    [facility] at [facility]    Hypertension  Assessment & Plan  Initially elevated upon admission.  Previously on 10 mg lisinopril.  Per patient and wife, not on any hypertension medication at this time.    Pt was started on lisinopril 2.5 mg daily, BP stable  Discharged on lisinopril 2.5 mg daily    CAD (coronary artery disease)  Assessment & Plan  Home medication of Brilanta 60 mg BID.     Continue home medication.    Hypothyroidism  Assessment & Plan  Home medication levothyroxine 125 mcg daily. TSH 0.293.    Decrease home dose to levothyroxine 100 mcg daily.  Recommend patient to monitor outpatient and repeat TSH with T4 in 6 to 8 weeks        Condition at Discharge: stable      Discharge Day Visit / Exam:      Vitals: Blood Pressure: 146/93 (08/24/24 0808)  Pulse: 68 (08/24/24 0808)  Temperature: 97.6 °F (36.4 °C) (08/24/24 0808)  Temp Source: Oral (08/24/24 0808)  Respirations: 18 (08/24/24 0808)  Height: 6' 1\" (185.4 cm) (08/20/24 1408)  Weight - Scale: 79.4 kg (175 lb) (08/24/24 0559)  SpO2: 95 % (08/24/24 0808)  Exam:   Physical Exam  Vitals reviewed.   Constitutional:       General: He is not in acute distress.     Appearance: Normal appearance. He is not ill-appearing.   HENT:      Head: Normocephalic and atraumatic.      Right Ear: External ear normal.      Left Ear: External ear normal.      Nose: Nose normal. No congestion or rhinorrhea.      Mouth/Throat:      Mouth: Mucous membranes are moist.   Eyes:      General:         Right eye: No discharge.         Left eye: No discharge.      Extraocular Movements: Extraocular movements intact.      Conjunctiva/sclera: Conjunctivae normal.   Cardiovascular:      Rate and Rhythm: Normal rate " and regular rhythm.      Pulses: Normal pulses.      Heart sounds: Normal heart sounds. No murmur heard.     No gallop.   Pulmonary:      Effort: Pulmonary effort is normal. No respiratory distress.      Breath sounds: Normal breath sounds. No stridor. No wheezing, rhonchi or rales.   Chest:      Chest wall: No tenderness.   Abdominal:      General: Abdomen is flat. Bowel sounds are normal. There is no distension.      Palpations: Abdomen is soft.      Tenderness: There is no abdominal tenderness. There is no guarding.   Musculoskeletal:         General: No swelling, tenderness or deformity. Normal range of motion.      Cervical back: Normal range of motion and neck supple. No rigidity.      Right lower leg: No edema.      Left lower leg: No edema.   Lymphadenopathy:      Cervical: No cervical adenopathy.   Skin:     General: Skin is warm and dry.      Capillary Refill: Capillary refill takes less than 2 seconds.      Findings: No bruising, erythema, lesion or rash.   Neurological:      General: No focal deficit present.      Mental Status: He is alert and oriented to person, place, and time. Mental status is at baseline.      Cranial Nerves: No cranial nerve deficit.      Motor: No weakness.      Gait: Gait normal.   Psychiatric:         Mood and Affect: Mood normal.         Behavior: Behavior normal.         Thought Content: Thought content normal.          Discussion with Family: discharge planning  Patient Information Sharing: With the consent of Pavan Wagner, their loved ones (Wood,Billigene) were notified today by inpatient team of the patient’s condition and current plan.  All questions answered.      Discharge instructions/Information to patient and family:   See after visit summary for information provided to patient and family.       Discharge Medications:     Medication List      START taking these medications     cyanocobalamin 1000 MCG tablet; Commonly known as: VITAMIN B-12; Take 1   tablet (1,000 mcg  total) by mouth daily; Start taking on: August 25, 2024   guaiFENesin 200 MG/10ML oral liquid; Commonly known as: ROBITUSSIN; Take   10 mL (200 mg total) by mouth every 4 (four) hours as needed (cough)   polyethylene glycol 17 g packet; Commonly known as: MIRALAX; Take 17 g   by mouth daily as needed (constipation)     CHANGE how you take these medications     levothyroxine 100 mcg tablet; Take 1 tablet (100 mcg total) by mouth   daily in the early morning; Start taking on: August 25, 2024; What   changed: medication strength, how much to take, when to take this   lisinopril 2.5 mg tablet; Commonly known as: ZESTRIL; Take 1 tablet (2.5   mg total) by mouth daily; Start taking on: August 25, 2024; What changed:   medication strength, how much to take     CONTINUE taking these medications     Brilinta 60 MG; Generic drug: ticagrelor   Eliquis 5 mg; Generic drug: apixaban   rOPINIRole 1 mg tablet; Commonly known as: REQUIP        Disposition:   Other Skilled Nursing Facility at [facility]     For Discharges to [facility] SNF:   ·       Not Applicable to this Patient - Not Applicable to this Patient     Discharge Statement:  I spent >45 minutes discharging the patient. This time was spent on the day of discharge. I had direct contact with the patient on the day of discharge. Greater than 50% of the total time was spent examining patient, answering all patient questions, arranging and discussing plan of care with patient as well as directly providing post-discharge instructions.  Additional time then spent on discharge activities.     ** Please Note: This note has been constructed using a voice recognition system **     Reece Parks MD   08/24/24  2:12 PM

## 2024-08-24 NOTE — PLAN OF CARE
Problem: PAIN - ADULT  Goal: Verbalizes/displays adequate comfort level or baseline comfort level  Description: Interventions:  - Encourage patient to monitor pain and request assistance  - Assess pain using appropriate pain scale  - Administer analgesics based on type and severity of pain and evaluate response  - Implement non-pharmacological measures as appropriate and evaluate response  - Consider cultural and social influences on pain and pain management  - Notify physician/advanced practitioner if interventions unsuccessful or patient reports new pain  Outcome: Progressing     Problem: INFECTION - ADULT  Goal: Absence or prevention of progression during hospitalization  Description: INTERVENTIONS:  - Assess and monitor for signs and symptoms of infection  - Monitor lab/diagnostic results  - Monitor all insertion sites, i.e. indwelling lines, tubes, and drains  - Monitor endotracheal if appropriate and nasal secretions for changes in amount and color  - [place] appropriate cooling/warming therapies per order  - Administer medications as ordered  - Instruct and encourage patient and family to use good hand hygiene technique  - Identify and instruct in appropriate isolation precautions for identified infection/condition  Outcome: Progressing     Problem: SAFETY ADULT  Goal: Patient will remain free of falls  Description: INTERVENTIONS:  - Educate patient/family on patient safety including physical limitations  - Instruct patient to call for assistance with activity   - Consult OT/PT to assist with strengthening/mobility   - Keep Call bell within reach  - Keep bed low and locked with side rails adjusted as appropriate  - Keep care items and personal belongings within reach  - Initiate and maintain comfort rounds  - Make Fall Risk Sign visible to staff  - Offer Toileting every 2 Hours, in advance of need  - Initiate/Maintain bEDalarm  - Obtain necessary fall risk management equipmen kendra  - Apply yellow socks  and bracelet for high fall risk patients  - Consider moving patient to room near nurses station  Outcome: Progressing     Problem: RESPIRATORY - ADULT  Goal: Achieves optimal ventilation and oxygenation  Description: INTERVENTIONS:  - Assess for changes in respiratory status  - Assess for changes in mentation and behavior  - Position to facilitate oxygenation and minimize respiratory effort  - Oxygen administered by appropriate delivery if ordered  - Initiate smoking cessation education as indicated  - Encourage broncho-pulmonary hygiene including cough, deep breathe, Incentive Spirometry  - Assess the need for suctioning and aspirate as needed  - Assess and instruct to report SOB or any respiratory difficulty  - Respiratory Therapy support as indicated  Outcome: Progressing     Problem: Prexisting or High Potential for Compromised Skin Integrity  Goal: Skin integrity is maintained or improved  Description: INTERVENTIONS:  - Identify patients at risk for skin breakdown  - Assess and monitor skin integrity  - Assess and monitor nutrition and hydration status  - Monitor labs   - Assess for incontinence   - Turn and reposition patient  - Assist with mobility/ambulation  - Relieve pressure over bony prominences  - Avoid friction and shearing  - Provide appropriate hygiene as needed including keeping skin clean and dry  - Evaluate need for skin moisturizer/barrier cream  - Collaborate with interdisciplinary team   - Patient/family teaching  - Consider wound care consult   Outcome: Progressing

## 2024-08-24 NOTE — CASE MANAGEMENT
Case Management Discharge Planning Note    Patient name Pavan Edward  Location [address] [address]-* MRN [id number]  : 1935 Date 2024       Current Admission Date: 2024  Current Admission Diagnosis:COVID   Patient Active Problem List    Diagnosis Date Noted Date Diagnosed    Other constipation 2024     Thrombocytopenia (HCC) 2024     B12 deficiency 2024     Positive D dimer 2024     Paroxysmal atrial fibrillation (HCC) 2024     Hypothyroidism 2024     CAD (coronary artery disease) 2024     Hypertension 2024     COVID 2024     Weakness 2024     Ambulatory dysfunction 2024     Aneurysm of common iliac artery (HCC) 2024     Renal cyst, left 2024     Parkinson's disease 2024       LOS (days): 4  Geometric Mean LOS (GMLOS) (days): 3.3  Days to GMLOS:-0.7     OBJECTIVE:  Risk of Unplanned Readmission Score: 11.41     Current admission status: Inpatient   Preferred Pharmacy:   [facility] [address] [place] [address] - [address]  [address]  [facility] [address]  Phone: [phone] Fax: [phone]    Primary Care Provider: No primary care provider on file.    Primary Insurance: [employer] REP  Secondary Insurance:     DISCHARGE DETAILS:    Discharge planning discussed with:: Wife, Billigene Wood  Freedom of Choice: Yes  Comments - Freedom of Choice: SW following to assist with planning.  Discharge to rehab is planned for today. Confirmed bed availability with [facility] at [facility]. SW spoke with pt's wife over phone to review plans and transportation.  Wife aware of plan for pt to transfer to [facility] for rehab today.  SW discussed option for wheelchair van transport and charges associated with service.  Wife was agreeable with van transport.  CM contacted family/caregiver?: Yes    Contacts  Patient Contacts: Billigene Wood (spouse)  Relationship to Patient:: Family  Contact Method:  Phone  Phone Number: [phone]  Reason/Outcome: Discharge Planning    Other Referral/Resources/Interventions Provided:  Interventions: Short Term Rehab  Referral Comments: STR placement at [facility] is planned.  Insurance authorization was received.    Treatment Team Recommendation: Short Term Rehab  Discharge Destination Plan:: Short Term Rehab  Transport at Discharge : Wheelchair van  Dispatcher Contacted: Yes  Number/Name of Dispatcher: Roundtrip  Transported by (Company and Unit #): [employer]  ETA of Transport (Date): 08/24/24  ETA of Transport (Time): 1600          Accepting Facility Name, City & State : [place], [place]  Receiving Facility/Agency Phone Number: [phone]

## 2024-08-24 NOTE — DISCHARGE INSTR - AVS FIRST PAGE
Please follow up with neurology, cardiology, and vascular surgery in one month    Please find new PCP around the area. If you want, contact [facility] at [phone] for new PCP appointment.

## 2024-08-24 NOTE — [place] UNIVERSAL TRANSFER FORM
"[place] UNIVERSAL TRANSFER FORM  (ALL ITEMS MUST BE COMPLETED)    1. TRANSFER FROM: [facility]      TRANSFER TO: 08/24/24    2. DATE OF TRANSFER: 8/24/2024                        TIME OF TRANSFER: 1500    3. PATIENT NAME: Pavan Edward,        YOB: 1935                             GENDER: male    4. LANGUAGE:   English    5. PHYSICIAN NAME:  Vanesa Benitez MD                   PHONE: [phone]    6. CODE STATUS: Level 1 - Full Code        Out of Hospital DNR Attached: No    7. :                                      :  Extended Emergency Contact Information  Primary Emergency Contact: Wood,Billigene  Mobile Phone: [phone]  Relation: Spouse  Secondary Emergency Contact: Isai Edward  Mobile Phone: [phone]  Relation: Son           Health Care Representative/Proxy:  No           Legal Guardian:  No             NAME OF:           HEALTH CARE REPRESENTATIVE/PROXY:                                         OR           LEGAL GUARDIAN, IF NOT :                                               PHONE:  (Day)           (Night)                        (Cell)    8. REASON FOR TRANSFER: (Must include brief medical history and recent changes in physical function or cognition.) Appropriate for rehab.            V/S: /93 (BP Location: Left arm)   Pulse 68   Temp 97.6 °F (36.4 °C) (Oral)   Resp 18   Ht 6' 1\" (1.854 m)   Wt 79.4 kg (175 lb)   SpO2 95%   BMI 23.09 kg/m²           PAIN: None    9. PRIMARY DIAGNOSIS: COVID      Secondary Diagnosis:         Pacemaker: No      Internal Defib: No          Mental Health Diagnosis (if Applicable):    10. RESTRAINTS: No     11. RESPIRATORY NEEDS: None    12. ISOLATION/PRECAUTION: Covid + Day 4    13. ALLERGY: Patient has no known allergies.    14. SENSORY:       Vision Good, Hearing Good , and Speech Clear    15. SKIN CONDITION: No Wounds    16. DIET:Cardiac Diet    17. IV " ACCESS: None    18. PERSONAL ITEMS SENT WITH PATIENT: None    19. ATTACHED DOCUMENTS: MUST ATTACH CURRENT MEDICATION INFORMATION Face Sheet and Medication Reconciliation    20. AT RISK ALERTS:Falls        HARM TO: N/A    21. WEIGHT BEARING STATUS:         Left Leg: Limited        Right Leg: Limited    22. MENTAL STATUS:Alert and Oriented X3    23. FUNCTION:        Walk: With Help        Transfer: With Help        Toilet: With Help        Feed: Self with set up.    24. IMMUNIZATIONS/SCREENING:     There is no immunization history on file for this patient.    25. BOWEL: Continent    26. BLADDER: Incontinent     27. SENDING FACILITY CONTACT: [facility]                   Title:         Unit:         Phone:          [facility] CONTACT (if known):        Title:        Unit:         Phone:         FORM PREFILLED BY (if applicable)       Title:       Unit:        Phone:         FORM COMPLETED BY Sesar Perez      Title: JANNIE      Phone: [phone]

## 2024-08-24 NOTE — ASSESSMENT & PLAN NOTE
Plt 131k at admission > 96 >123>135 stable before discharge. Baseline 150-160k  Most likely acute due to Covid infection.    Will repeat CBC in one week to double check

## 2024-08-26 LAB — BACTERIA BLD CULT: NORMAL

## 2024-08-27 ENCOUNTER — TELEPHONE (OUTPATIENT)
Age: 89
End: 2024-08-27

## 2024-08-27 NOTE — TELEPHONE ENCOUNTER
----- Message from Reece Parks MD sent at 8/24/2024 11:56 AM EDT -----  This patient was board hit patient being discharged and please ask the patient if he wants to follow us as PCP.

## 2024-10-09 ENCOUNTER — LAB (OUTPATIENT)
Dept: LAB | Facility: HOSPITAL | Age: 89
End: 2024-10-09
Payer: COMMERCIAL

## 2024-10-09 ENCOUNTER — TELEPHONE (OUTPATIENT)
Age: 89
End: 2024-10-09

## 2024-10-09 DIAGNOSIS — E03.9 HYPOTHYROIDISM, UNSPECIFIED TYPE: ICD-10-CM

## 2024-10-09 DIAGNOSIS — D69.6 THROMBOCYTOPENIA (HCC): ICD-10-CM

## 2024-10-09 LAB
BASOPHILS # BLD AUTO: 0.04 THOUSANDS/ΜL (ref 0–0.1)
BASOPHILS NFR BLD AUTO: 1 % (ref 0–1)
EOSINOPHIL # BLD AUTO: 0.13 THOUSAND/ΜL (ref 0–0.61)
EOSINOPHIL NFR BLD AUTO: 2 % (ref 0–6)
ERYTHROCYTE [DISTWIDTH] IN BLOOD BY AUTOMATED COUNT: 13.4 % (ref 11.6–15.1)
HCT VFR BLD AUTO: 43 % (ref 36.5–49.3)
HGB BLD-MCNC: 13.9 G/DL (ref 12–17)
IMM GRANULOCYTES # BLD AUTO: 0.01 THOUSAND/UL (ref 0–0.2)
IMM GRANULOCYTES NFR BLD AUTO: 0 % (ref 0–2)
LYMPHOCYTES # BLD AUTO: 0.56 THOUSANDS/ΜL (ref 0.6–4.47)
LYMPHOCYTES NFR BLD AUTO: 9 % (ref 14–44)
MCH RBC QN AUTO: 29.9 PG (ref 26.8–34.3)
MCHC RBC AUTO-ENTMCNC: 32.3 G/DL (ref 31.4–37.4)
MCV RBC AUTO: 93 FL (ref 82–98)
MONOCYTES # BLD AUTO: 0.46 THOUSAND/ΜL (ref 0.17–1.22)
MONOCYTES NFR BLD AUTO: 8 % (ref 4–12)
NEUTROPHILS # BLD AUTO: 4.92 THOUSANDS/ΜL (ref 1.85–7.62)
NEUTS SEG NFR BLD AUTO: 80 % (ref 43–75)
NRBC BLD AUTO-RTO: 0 /100 WBCS
PLATELET # BLD AUTO: 159 THOUSANDS/UL (ref 149–390)
PMV BLD AUTO: 11.2 FL (ref 8.9–12.7)
RBC # BLD AUTO: 4.65 MILLION/UL (ref 3.88–5.62)
T4 FREE SERPL-MCNC: 1.03 NG/DL (ref 0.61–1.12)
TSH SERPL DL<=0.05 MIU/L-ACNC: 3.74 UIU/ML (ref 0.45–4.5)
WBC # BLD AUTO: 6.12 THOUSAND/UL (ref 4.31–10.16)

## 2024-10-09 PROCEDURE — 85025 COMPLETE CBC W/AUTO DIFF WBC: CPT

## 2024-10-09 PROCEDURE — 84439 ASSAY OF FREE THYROXINE: CPT

## 2024-10-09 PROCEDURE — 84443 ASSAY THYROID STIM HORMONE: CPT

## 2024-10-09 PROCEDURE — 36415 COLL VENOUS BLD VENIPUNCTURE: CPT

## 2024-10-09 NOTE — RESULT ENCOUNTER NOTE
Pt's wife was called to notify that his TSH and T4 level is normal which means he can continue his 100 mcg of levothyroxine, and his CBC showed normal level of platelet and Hgb. We will also forward these results to his PCP. Pt's wife acknowledged her understanding and answered all her questions.

## 2024-10-30 NOTE — PROGRESS NOTES
Ambulatory Visit  Name: Pavan Edward      : 1935      MRN: [id number]  Encounter Provider: Jamie Tom DO  Encounter Date: 2024   Encounter department: [facility]    Assessment & Plan  Aneurysm of common iliac artery (HCC)  88-year-old male referred for right common iliac artery aneurysm.  He is known about the aneurysm for a couple of years he [place]  he also had a CAT scan done in August of this year after a fall.  This demonstrated a 2.4 cm right common iliac artery aneurysm with some focal dissection that is nonflow limiting.  He also has some slight ectasia of his left common iliac artery.  He is currently asymptomatic he denies claudication I discussed the results of his testing with him and offered him 2 options due to his advanced age I offered no further surveillance and I also offered annual aortic duplex.  I explained that generally this size would increase quite slowly and would take many years to reach threshold size of 4 cm.  He does not wish to undergo annual duplexes and office visits and he will follow-up with us on an as-needed basis.    Orders:    Ambulatory Referral to Vascular Surgery    Aortic dissection, abdominal (HCC)           History of Present Illness       New patient, referred for common iliac artery aneurysm and presents today for evaluation. CT c/a/p 24. Denies any acute abdominal pain, back pain or leg pain.     Pavan Edward is a 88 y.o. male     History obtained from : patient  Review of Systems   Constitutional: Negative.    HENT: Negative.     Eyes: Negative.    Respiratory: Negative.     Cardiovascular: Negative.    Gastrointestinal: Negative.    Endocrine: Negative.    Genitourinary: Negative.    Musculoskeletal:  Positive for back pain and gait problem.   Skin: Negative.    Allergic/Immunologic: Negative.    Neurological:  Positive for weakness.   Hematological: Negative.    Psychiatric/Behavioral:  "Negative.       I have reviewed the ROS above and made changes as needed.        Objective     /64 (BP Location: Right arm, Patient Position: Sitting)   Pulse 74   Ht 6' 1\" (1.854 m)   Wt 82.1 kg (181 lb)   BMI 23.88 kg/m²     Physical Exam    General  Exam: alert, awake, oriented, no distress, consistent with stated age      Head and Neck  Exam: supple, no bruits, trachea midline, no JVD, no mass or palpable nodes      Adbomen  Exam: soft, non-tender, non-distended, no pulsatile abdominal masses, no abdominal bruit    Peripheral Vascular  Exam: no clubbing of the digits of the upper extremity, radial pulses 2+ bilaterally, skin well perfused, without and no varicosities.    No widened popliteal pulse noted bilaterally    Upper Extremity:  Palpation: Radial pulse- Bilateral 2+    Neurologic  Exam:alert, non-focal, oriented x 3, cranial nerves II-XII grossly intact            Administrative Statements   I have spent a total time of 20 minutes in caring for this patient on the day of the visit/encounter including Diagnostic results, Prognosis, Patient and family education, Documenting in the medical record, Reviewing / ordering tests, medicine, procedures  , and Obtaining or reviewing history  .  "

## 2024-11-01 ENCOUNTER — CONSULT (OUTPATIENT)
Dept: VASCULAR SURGERY | Facility: CLINIC | Age: 89
End: 2024-11-01
Payer: COMMERCIAL

## 2024-11-01 VITALS
HEART RATE: 74 BPM | DIASTOLIC BLOOD PRESSURE: 64 MMHG | SYSTOLIC BLOOD PRESSURE: 100 MMHG | BODY MASS INDEX: 23.99 KG/M2 | WEIGHT: 181 LBS | HEIGHT: 73 IN

## 2024-11-01 DIAGNOSIS — I71.02 AORTIC DISSECTION, ABDOMINAL (HCC): Primary | ICD-10-CM

## 2024-11-01 DIAGNOSIS — I72.3 ANEURYSM OF COMMON ILIAC ARTERY (HCC): ICD-10-CM

## 2024-11-01 PROCEDURE — 99204 OFFICE O/P NEW MOD 45 MIN: CPT | Performed by: SURGERY

## 2024-11-01 NOTE — ASSESSMENT & PLAN NOTE
88-year-old male referred for right common iliac artery aneurysm.  He is known about the aneurysm for a couple of years he [place] he also had a CAT scan done in August of this year after a fall.  This demonstrated a 2.4 cm right common iliac artery aneurysm with some focal dissection that is nonflow limiting.  He also has some slight ectasia of his left common iliac artery.  He is currently asymptomatic he denies claudication I discussed the results of his testing with him and offered him 2 options due to his advanced age I offered no further surveillance and I also offered annual aortic duplex.  I explained that generally this size would increase quite slowly and would take many years to reach threshold size of 4 cm.  He does not wish to undergo annual duplexes and office visits and he will follow-up with us on an as-needed basis.    Orders:    Ambulatory Referral to Vascular Surgery

## 2024-12-02 NOTE — PROGRESS NOTES
Consultation - Cardiology Office  [facility].    Pavan Edward 88 y.o. male MRN: [id number]  : 1935  Unit/Bed#:  Encounter: [id number]      ASSESSMENT:  Assessment & Plan  Paroxysmal atrial fibrillation (HCC)  On Eliquis 5 mg twice daily  In sinus rhythm today    Benign essential hypertension  On lisinopril 2.5 mg  Aortic dissection, abdominal (HCC)    Peripheral arterial disease (HCC)  Aneurysm of common iliac artery  Abdominal aortic dissection  Followed by vascular surgery  On Brilinta 60 mg every 12 hours  Pure hypercholesterolemia  2024: , TG 66, HDL 53, normal AST and ALT  > Add atorvastatin 20 mg daily  Ischemic stroke (HCC)    CAD in native artery  CAD  S/p PCI X2 on 2023  Bradycardia    Primary hypertension  /80 with heart rate of 54/min    Parkinson's disease  Hypothyroidism  Left renal cyst  Thrombocytopenia  Parkinson's disease    Lexiscan, 01/15/2024:    2. LVEF estimated at 56% on stress imaging and 55% on rest imaging.     3. No significant ischemia myocardial perfusion imaging.     2D echo, 1/15/2024:   1. The images were of adequate diagnostic quality.    2. The left ventricle ejection fraction is estimated to be 60%, which is normal systolic function.    3. Grade 1 diastolic dysfunction.    4. Mild to moderate concentric left ventricular hypertrophy.    5. Mildly dilated left atrium.    6. The aortic root is normal size       RECOMMENDATIONS:  Add atorvastatin 20 mg daily  Continue Eliquis 5 mg twice daily and lisinopril  Also on Brilinta 60 mg every 12 hours for PAD  Follow-up with neurology and vascular surgery      ADDENDUM, 2024  I was informed that the patient needs clearance for dental procedures including cleaning, xray, filling, crown, bridges, extractions, and root canals  From a cardiac standpoint she has intermediate risk for the above mentioned procedures  She does not require any antibiotic prophylaxis  Eliquis can be held for  48 hours prior to the procedure and then resumed postop.  Patient is also on Brilinta for her peripheral arterial disease and is managed by vascular surgery      Thank you for your consultation.  If you have any question please call me at [phone]      Primary Care Physician Requesting Consult: Phil Major      Reason for Consult / Principal Problem: Establishing cardiac care        HPI :     Pavan Edward is a 88 y.o. year old male who was referred by primary care doctor for establishing cardiac care.  Patient was previously seeing cardiologist in [place]  He has a history of paroxysmal atrial fibrillation, CAD, s/p PCI, hypertension and untreated hyperlipidemia.  He also has significant peripheral arterial disease on Brilinta and follows with vascular surgery  He has significant Parkinson's disease, currently being treated by neurologist in [place]      Review of Systems   Musculoskeletal:  Positive for gait problem.   Neurological:  Positive for tremors.   All other systems reviewed and are negative.        Historical Information   Past Medical History:   Diagnosis Date    AA (aortic aneurysm) ([facility])     Coronary artery disease     CVA (cerebral vascular accident) ([facility])     Parkinsons ([facility])     Pulmonary emboli ([facility])      Past Surgical History:   Procedure Laterality Date    CATARACT EXTRACTION      CORONARY ANGIOPLASTY WITH STENT PLACEMENT       Social History     Substance and Sexual Activity   Alcohol Use Never     Social History     Substance and Sexual Activity   Drug Use Never     Social History     Tobacco Use   Smoking Status Never   Smokeless Tobacco Never     Family History: No family history on file.    Meds/Allergies     No Known Allergies    Current Outpatient Medications:     apixaban (Eliquis) 5 mg, Take 5 mg by mouth 2 (two) times a day, Disp: , Rfl:     atorvastatin (LIPITOR) 20 mg tablet, Take 1 tablet (20 mg total) by mouth daily, Disp: 90 tablet, Rfl: 2    carbidopa-levodopa  "(SINEMET)  mg per tablet, TAKE 1/2 (ONE-HALF) TABLET BY MOUTH AT BEDTIME FOR 7 DAYS THEN 1/2 (ONE-HALF) TWICE DAILY FOR 7 DAYS THEN 1/2 (ONE-HALF) THREE TIMES DAILY FROM THERE AFTER, Disp: , Rfl:     Cholecalciferol 50 MCG (2000 UT) TABS, Take 50 mcg by mouth daily, Disp: , Rfl:     levothyroxine 100 mcg tablet, Take 1 tablet (100 mcg total) by mouth daily in the early morning, Disp: , Rfl:     lisinopril (ZESTRIL) 2.5 mg tablet, Take 1 tablet (2.5 mg total) by mouth daily, Disp: , Rfl:     polyethylene glycol (MIRALAX) 17 g packet, Take 17 g by mouth daily as needed (constipation), Disp: , Rfl:     rOPINIRole (REQUIP) 1 mg tablet, Take 1 mg by mouth 3 (three) times a day, Disp: , Rfl:     ticagrelor (Brilinta) 60 MG, Take 60 mg by mouth every 12 (twelve) hours, Disp: , Rfl:     cyanocobalamin (VITAMIN B-12) 1000 MCG tablet, Take 1 tablet (1,000 mcg total) by mouth daily (Patient not taking: Reported on 11/1/2024), Disp: , Rfl:     guaiFENesin (ROBITUSSIN) 200 MG/10ML oral liquid, Take 10 mL (200 mg total) by mouth every 4 (four) hours as needed (cough) (Patient not taking: Reported on 11/1/2024), Disp: , Rfl:     Vitals: Blood pressure 138/80, pulse (!) 54, height 6' 1\" (1.854 m), weight 82.1 kg (181 lb), SpO2 96%.    Body mass index is 23.88 kg/m².  Vitals:    12/03/24 1052   Weight: 82.1 kg (181 lb)     BP Readings from Last 3 Encounters:   12/03/24 138/80   11/01/24 100/64   08/24/24 127/76       Physical Exam  PHYSICAL EXAMINATION:  Neurologic:  Alert & oriented x 3, no new focal deficits, Not in any acute distress,  Constitutional:  Well developed, well nourished, non-toxic appearance   Eyes:  Pupil equal and reacting to light, conjunctiva normal, No JVP, No LNP   HENT:  Atraumatic, oropharynx moist, Neck- normal range of motion, no tenderness,  Neck supple   Respiratory:  Bilateral air entry, mostly clear to auscultation  Cardiovascular: S1-S2 regular with a I/VI systolic murmur   GI:  Soft, " "nondistended, normal bowel sounds, nontender, no hepatosplenomegaly appreciated.  Musculoskeletal: Tremors  Skin:  Well hydrated, no rash   Lymphatic:  No lymphadenopathy noted   Extremities:  No edema and distal pulses are present    Diagnostic Studies Review Cardio:      EKG: Baseline artifacts due to tremors.  Sinus bradycardia, heart rate 54/min, left anterior fascicular block, LVH, nonspecific ST and T wave abnormality    Cardiac testing:   No results found for this or any previous visit.      Imaging:  Chest X-Ray:   No Chest XR results available for this patient.    CT-scan of the chest:     No CTA results available for this patient.  Lab Review   Lab Results   Component Value Date    WBC 6.12 10/09/2024    HGB 13.9 10/09/2024    HCT 43.0 10/09/2024    MCV 93 10/09/2024    RDW 13.4 10/09/2024     10/09/2024     BMP:  Lab Results   Component Value Date    SODIUM 138 08/24/2024    K 3.9 08/24/2024     (H) 08/24/2024    CO2 24 08/24/2024    BUN 23 08/24/2024    CREATININE 0.92 08/24/2024    GLUC 88 08/24/2024    CALCIUM 7.8 (L) 08/24/2024    CORRECTEDCA 8.6 08/24/2024    EGFR 73 08/24/2024    MG 2.3 08/24/2024     LFT:  Lab Results   Component Value Date    AST 14 08/24/2024    ALT 13 08/24/2024    ALKPHOS 27 (L) 08/24/2024    TP 5.2 (L) 08/24/2024    ALB 3.0 (L) 08/24/2024      Lab Results   Component Value Date    VWW2QQHGWIDO 3.737 10/09/2024     No components found for: \"TSH3\"  No results found for: \"HGBA1C\"  Lipid Profile:   Lab Results   Component Value Date    CHOLESTEROL 178 08/20/2024    HDL 53 08/20/2024    LDLCALC 112 (H) 08/20/2024    TRIG 66 08/20/2024     Lab Results   Component Value Date    CHOLESTEROL 178 08/20/2024     Lab Results   Component Value Date    CKTOTAL 61 08/20/2024     No results found for: \"NTBNP\"   No results found for this or any previous visit (from the past 4 weeks).        Dr. Matthias Gonzalez MD, FACC      \"This note has been constructed using a voice recognition " "system.Therefore there may be syntax, spelling, and/or grammatical errors. Please call if you have any questions. \"  "

## 2024-12-03 ENCOUNTER — OFFICE VISIT (OUTPATIENT)
Dept: CARDIOLOGY CLINIC | Facility: CLINIC | Age: 89
End: 2024-12-03
Payer: COMMERCIAL

## 2024-12-03 VITALS
OXYGEN SATURATION: 96 % | DIASTOLIC BLOOD PRESSURE: 80 MMHG | BODY MASS INDEX: 23.99 KG/M2 | HEART RATE: 54 BPM | SYSTOLIC BLOOD PRESSURE: 138 MMHG | HEIGHT: 73 IN | WEIGHT: 181 LBS

## 2024-12-03 DIAGNOSIS — I73.9 PERIPHERAL ARTERIAL DISEASE (HCC): ICD-10-CM

## 2024-12-03 DIAGNOSIS — I48.0 PAROXYSMAL ATRIAL FIBRILLATION (HCC): ICD-10-CM

## 2024-12-03 DIAGNOSIS — I63.9 ISCHEMIC STROKE (HCC): ICD-10-CM

## 2024-12-03 DIAGNOSIS — R00.1 BRADYCARDIA: ICD-10-CM

## 2024-12-03 DIAGNOSIS — I71.02 AORTIC DISSECTION, ABDOMINAL (HCC): ICD-10-CM

## 2024-12-03 DIAGNOSIS — I25.10 CAD IN NATIVE ARTERY: ICD-10-CM

## 2024-12-03 DIAGNOSIS — I25.10 CORONARY ARTERY DISEASE INVOLVING NATIVE CORONARY ARTERY OF NATIVE HEART WITHOUT ANGINA PECTORIS: ICD-10-CM

## 2024-12-03 DIAGNOSIS — I10 BENIGN ESSENTIAL HYPERTENSION: ICD-10-CM

## 2024-12-03 DIAGNOSIS — E78.00 PURE HYPERCHOLESTEROLEMIA: Primary | ICD-10-CM

## 2024-12-03 DIAGNOSIS — I44.7 LBBB (LEFT BUNDLE BRANCH BLOCK): ICD-10-CM

## 2024-12-03 DIAGNOSIS — I10 PRIMARY HYPERTENSION: ICD-10-CM

## 2024-12-03 PROCEDURE — 93000 ELECTROCARDIOGRAM COMPLETE: CPT | Performed by: INTERNAL MEDICINE

## 2024-12-03 PROCEDURE — 99213 OFFICE O/P EST LOW 20 MIN: CPT | Performed by: INTERNAL MEDICINE

## 2024-12-03 RX ORDER — ATORVASTATIN CALCIUM 20 MG/1
20 TABLET, FILM COATED ORAL DAILY
Qty: 90 TABLET | Refills: 2 | Status: SHIPPED | OUTPATIENT
Start: 2024-12-03

## 2024-12-03 RX ORDER — CARBIDOPA AND LEVODOPA 25; 100 MG/1; MG/1
TABLET ORAL
COMMUNITY
Start: 2024-11-20

## 2024-12-05 ENCOUNTER — TELEPHONE (OUTPATIENT)
Dept: CARDIOLOGY CLINIC | Facility: CLINIC | Age: 89
End: 2024-12-05

## 2024-12-05 NOTE — TELEPHONE ENCOUNTER
Fax rcd from [facility] for dental treatment clearance.   Incl; cleaning, xray, filling, crown, bridges, extractions, and root canals.   Please advise.

## 2024-12-25 ENCOUNTER — APPOINTMENT (EMERGENCY)
Dept: RADIOLOGY | Facility: HOSPITAL | Age: 89
End: 2024-12-25
Payer: COMMERCIAL

## 2024-12-25 ENCOUNTER — HOSPITAL ENCOUNTER (EMERGENCY)
Facility: HOSPITAL | Age: 89
Discharge: HOME | End: 2024-12-25
Attending: EMERGENCY MEDICINE
Payer: COMMERCIAL

## 2024-12-25 VITALS
TEMPERATURE: 98.4 F | SYSTOLIC BLOOD PRESSURE: 195 MMHG | DIASTOLIC BLOOD PRESSURE: 87 MMHG | HEART RATE: 63 BPM | OXYGEN SATURATION: 96 % | RESPIRATION RATE: 18 BRPM

## 2024-12-25 DIAGNOSIS — J10.1 INFLUENZA A: ICD-10-CM

## 2024-12-25 DIAGNOSIS — R33.9 URINARY RETENTION: Primary | ICD-10-CM

## 2024-12-25 LAB
ALBUMIN SERPL BCG-MCNC: 3.5 G/DL (ref 3.5–5)
ALP SERPL-CCNC: 37 U/L (ref 34–104)
ALT SERPL W P-5'-P-CCNC: 4 U/L (ref 7–52)
ANION GAP SERPL CALCULATED.3IONS-SCNC: 3 MMOL/L (ref 4–13)
AST SERPL W P-5'-P-CCNC: 12 U/L (ref 13–39)
BACTERIA UR QL AUTO: ABNORMAL /HPF
BASOPHILS # BLD AUTO: 0.02 THOUSANDS/ÂΜL (ref 0–0.1)
BASOPHILS NFR BLD AUTO: 0 % (ref 0–1)
BILIRUB SERPL-MCNC: 0.55 MG/DL (ref 0.2–1)
BILIRUB UR QL STRIP: NEGATIVE
BUN SERPL-MCNC: 29 MG/DL (ref 5–25)
CALCIUM SERPL-MCNC: 8.2 MG/DL (ref 8.4–10.2)
CHLORIDE SERPL-SCNC: 110 MMOL/L (ref 96–108)
CLARITY UR: ABNORMAL
CO2 SERPL-SCNC: 26 MMOL/L (ref 21–32)
COLOR UR: YELLOW
CREAT SERPL-MCNC: 1.01 MG/DL (ref 0.6–1.3)
EOSINOPHIL # BLD AUTO: 0.03 THOUSAND/ÂΜL (ref 0–0.61)
EOSINOPHIL NFR BLD AUTO: 1 % (ref 0–6)
ERYTHROCYTE [DISTWIDTH] IN BLOOD BY AUTOMATED COUNT: 13 % (ref 11.6–15.1)
FLUAV AG UPPER RESP QL IA.RAPID: POSITIVE
FLUBV AG UPPER RESP QL IA.RAPID: NEGATIVE
GFR SERPL CREATININE-BSD FRML MDRD: 65 ML/MIN/1.73SQ M
GLUCOSE SERPL-MCNC: 104 MG/DL (ref 65–140)
GLUCOSE UR STRIP-MCNC: NEGATIVE MG/DL
HCT VFR BLD AUTO: 39.5 % (ref 36.5–49.3)
HGB BLD-MCNC: 12.8 G/DL (ref 12–17)
HGB UR QL STRIP.AUTO: ABNORMAL
IMM GRANULOCYTES # BLD AUTO: 0.02 THOUSAND/UL (ref 0–0.2)
IMM GRANULOCYTES NFR BLD AUTO: 0 % (ref 0–2)
KETONES UR STRIP-MCNC: ABNORMAL MG/DL
LEUKOCYTE ESTERASE UR QL STRIP: NEGATIVE
LYMPHOCYTES # BLD AUTO: 0.33 THOUSANDS/ÂΜL (ref 0.6–4.47)
LYMPHOCYTES NFR BLD AUTO: 7 % (ref 14–44)
MCH RBC QN AUTO: 29.7 PG (ref 26.8–34.3)
MCHC RBC AUTO-ENTMCNC: 32.4 G/DL (ref 31.4–37.4)
MCV RBC AUTO: 92 FL (ref 82–98)
MONOCYTES # BLD AUTO: 0.6 THOUSAND/ÂΜL (ref 0.17–1.22)
MONOCYTES NFR BLD AUTO: 13 % (ref 4–12)
MUCOUS THREADS UR QL AUTO: ABNORMAL
NEUTROPHILS # BLD AUTO: 3.59 THOUSANDS/ÂΜL (ref 1.85–7.62)
NEUTS SEG NFR BLD AUTO: 79 % (ref 43–75)
NITRITE UR QL STRIP: NEGATIVE
NON-SQ EPI CELLS URNS QL MICRO: ABNORMAL /HPF
NRBC BLD AUTO-RTO: 0 /100 WBCS
PH UR STRIP.AUTO: 6.5 [PH]
PLATELET # BLD AUTO: 135 THOUSANDS/UL (ref 149–390)
PMV BLD AUTO: 11.2 FL (ref 8.9–12.7)
POTASSIUM SERPL-SCNC: 4.4 MMOL/L (ref 3.5–5.3)
PROT SERPL-MCNC: 5.7 G/DL (ref 6.4–8.4)
PROT UR STRIP-MCNC: ABNORMAL MG/DL
RBC # BLD AUTO: 4.31 MILLION/UL (ref 3.88–5.62)
RBC #/AREA URNS AUTO: ABNORMAL /HPF
SARS-COV+SARS-COV-2 AG RESP QL IA.RAPID: NEGATIVE
SODIUM SERPL-SCNC: 139 MMOL/L (ref 135–147)
SP GR UR STRIP.AUTO: 1.02 (ref 1–1.03)
UROBILINOGEN UR STRIP-ACNC: 2 MG/DL
WBC # BLD AUTO: 4.59 THOUSAND/UL (ref 4.31–10.16)
WBC #/AREA URNS AUTO: ABNORMAL /HPF

## 2024-12-25 PROCEDURE — 99284 EMERGENCY DEPT VISIT MOD MDM: CPT

## 2024-12-25 PROCEDURE — 74177 CT ABD & PELVIS W/CONTRAST: CPT

## 2024-12-25 PROCEDURE — 81001 URINALYSIS AUTO W/SCOPE: CPT | Performed by: EMERGENCY MEDICINE

## 2024-12-25 PROCEDURE — 36415 COLL VENOUS BLD VENIPUNCTURE: CPT | Performed by: EMERGENCY MEDICINE

## 2024-12-25 PROCEDURE — 85025 COMPLETE CBC W/AUTO DIFF WBC: CPT | Performed by: EMERGENCY MEDICINE

## 2024-12-25 PROCEDURE — 80053 COMPREHEN METABOLIC PANEL: CPT | Performed by: EMERGENCY MEDICINE

## 2024-12-25 PROCEDURE — 87804 INFLUENZA ASSAY W/OPTIC: CPT | Performed by: EMERGENCY MEDICINE

## 2024-12-25 PROCEDURE — 87811 SARS-COV-2 COVID19 W/OPTIC: CPT | Performed by: EMERGENCY MEDICINE

## 2024-12-25 PROCEDURE — 99285 EMERGENCY DEPT VISIT HI MDM: CPT | Performed by: EMERGENCY MEDICINE

## 2024-12-25 RX ADMIN — IOHEXOL 100 ML: 350 INJECTION, SOLUTION INTRAVENOUS at 04:27

## 2024-12-25 NOTE — ED PROVIDER NOTES
Time reflects when diagnosis was documented in both MDM as applicable and the Disposition within this note       Time User Action Codes Description Comment    12/25/2024  6:54 AM Randal Jones Add [R33.9] Urinary retention     12/25/2024  6:55 AM Randal Jones Add [J10.1] Influenza A           ED Disposition       ED Disposition   Discharge    Condition   Stable    Date/Time   Wed Dec 25, 2024  6:54 AM    Comment   Pavan Edward discharge to home/self care.                   Assessment & Plan       Medical Decision Making  89-year-old male presenting to the ED today for bilateral flank pain.  At this time he has left lower quadrant abdominal pain.  Differential includes kidney stone versus diverticulitis.  Will evaluate this time with a CBC, CMP, CT ab pelvis IV contrast.    On CT abdomen pelvis with IV contrast patient was found to have findings of possible chronic bladder outlet with obstruction.  Likely secondary to his enlarged prostate.  I had nursing staff place Sparks catheter.  Will check a UA off of that.  He was also found to be flu positive.  Will plan to discharge home.  Encouraged outpatient with urology.  Will give them information to call and make appointments.  Return precautions discussed and patient to be discharged home.    Amount and/or Complexity of Data Reviewed  Labs: ordered. Decision-making details documented in ED Course.  Radiology: ordered.    Risk  Prescription drug management.        ED Course as of 12/25/24 0709   Wed Dec 25, 2024   0621 Influenza A Rapid Antigen(!): Positive   0622 CBC and differential(!)  Reviewed and no actionable derangements.   0622 Comprehensive metabolic panel(!)  No actionable derangements.       Medications   iohexol (OMNIPAQUE) 350 MG/ML injection (MULTI-DOSE) 100 mL (100 mL Intravenous Given 12/25/24 0427)       ED Risk Strat Scores                          SBIRT 22yo+      Flowsheet Row Most Recent Value   Initial Alcohol Screen: US AUDIT-C     1. How often  do you have a drink containing alcohol? 0 Filed at: 12/25/2024 0227   2. How many drinks containing alcohol do you have on a typical day you are drinking?  0 Filed at: 12/25/2024 0227   3a. Male UNDER 65: How often do you have five or more drinks on one occasion? 0 Filed at: 12/25/2024 0227   3b. FEMALE Any Age, or MALE 65+: How often do you have 4 or more drinks on one occassion? 0 Filed at: 12/25/2024 0227   Audit-C Score 0 Filed at: 12/25/2024 0227   ARNALDO: How many times in the past year have you...    Used an illegal drug or used a prescription medication for non-medical reasons? Never Filed at: 12/25/2024 0227                            History of Present Illness       Chief Complaint   Patient presents with    Back Pain     PT arrived via EMS bilateral lower back pain and dysuria, pt states has hx kidney stones.       Past Medical History:   Diagnosis Date    AA (aortic aneurysm) (HCC)     Coronary artery disease     CVA (cerebral vascular accident) (HCC)     Parkinsons (HCC)     Pulmonary emboli (HCC)       Past Surgical History:   Procedure Laterality Date    CATARACT EXTRACTION      CORONARY ANGIOPLASTY WITH STENT PLACEMENT        History reviewed. No pertinent family history.   Social History     Tobacco Use    Smoking status: Never    Smokeless tobacco: Never   Vaping Use    Vaping status: Never Used   Substance Use Topics    Alcohol use: Never    Drug use: Never      E-Cigarette/Vaping    E-Cigarette Use Never User       E-Cigarette/Vaping Substances    Nicotine No     THC No     CBD No     Flavoring No     Other No     Unknown No       I have reviewed and agree with the history as documented.     89-year-old male past medical history significant for history of kidney stones presenting to the ED today for left lower quadrant abdominal pain.  Patient states that this started yesterday.  Initially was complaining of bilateral flank pain as well.  Has a history of kidney stones and thinks it is feels  similar.  Wife then comes in later and tells me that he presented similarly in the past with some weakness when he was diagnosed with COVID and states that he does also have a history of kidney stones and that this also is why that he presents.  No fevers chills.  No nausea or vomiting.        Review of Systems   Constitutional:  Negative for chills and fever.   HENT:  Negative for hearing loss.    Eyes:  Negative for visual disturbance.   Respiratory:  Negative for shortness of breath.    Cardiovascular:  Negative for chest pain.   Gastrointestinal:  Positive for abdominal pain. Negative for constipation, diarrhea, nausea and vomiting.   Genitourinary:  Negative for difficulty urinating.   Musculoskeletal:  Negative for myalgias.   Skin:  Negative for rash.   Neurological:  Negative for dizziness.   Psychiatric/Behavioral:  Negative for agitation.    All other systems reviewed and are negative.          Objective       ED Triage Vitals [12/25/24 0228]   Temperature Pulse Blood Pressure Respirations SpO2 Patient Position - Orthostatic VS   98.4 °F (36.9 °C) 64 121/70 18 96 % Sitting      Temp Source Heart Rate Source BP Location FiO2 (%) Pain Score    Oral Monitor Right arm -- --      Vitals      Date and Time Temp Pulse SpO2 Resp BP Pain Score FACES Pain Rating User   12/25/24 0630 -- 63 96 % 18 195/87 -- --    12/25/24 0530 -- 65 98 % 18 200/88 -- --    12/25/24 0400 -- 69 98 % -- 122/90 -- --    12/25/24 0228 98.4 °F (36.9 °C) 64 96 % 18 121/70 -- --             Physical Exam  Vitals and nursing note reviewed.   Constitutional:       General: He is not in acute distress.     Appearance: Normal appearance. He is not ill-appearing.   HENT:      Head: Normocephalic and atraumatic.      Right Ear: External ear normal.      Left Ear: External ear normal.      Nose: Nose normal. No congestion.      Mouth/Throat:      Mouth: Mucous membranes are moist.      Pharynx: Oropharynx is clear. No oropharyngeal exudate.    Eyes:      General:         Right eye: No discharge.         Left eye: No discharge.      Extraocular Movements: Extraocular movements intact.      Conjunctiva/sclera: Conjunctivae normal.      Pupils: Pupils are equal, round, and reactive to light.   Cardiovascular:      Rate and Rhythm: Normal rate and regular rhythm.      Pulses: Normal pulses.      Heart sounds: Normal heart sounds.   Pulmonary:      Effort: Pulmonary effort is normal. No respiratory distress.      Breath sounds: Normal breath sounds. No wheezing.   Abdominal:      General: Abdomen is flat. Bowel sounds are normal. There is no distension.      Palpations: Abdomen is soft.      Tenderness: There is abdominal tenderness in the left lower quadrant.   Musculoskeletal:         General: No swelling or deformity. Normal range of motion.      Cervical back: Normal range of motion. No rigidity.   Skin:     General: Skin is warm and dry.      Capillary Refill: Capillary refill takes less than 2 seconds.   Neurological:      General: No focal deficit present.      Mental Status: He is alert and oriented to person, place, and time. Mental status is at baseline.      Cranial Nerves: No cranial nerve deficit.      Motor: No weakness.      Gait: Gait normal.   Psychiatric:         Mood and Affect: Mood normal.         Behavior: Behavior normal.         Results Reviewed       Procedure Component Value Units Date/Time    Urine Microscopic [[id number]]  (Abnormal) Collected: 12/25/24 0638    Lab Status: Final result Specimen: Urine, Indwelling Sparks Catheter Updated: 12/25/24 0659     RBC, UA Innumerable /hpf      WBC, UA 2-4 /hpf      Epithelial Cells None Seen /hpf      Bacteria, UA Occasional /hpf      MUCUS THREADS Occasional    UA w Reflex to Microscopic w Reflex to Culture [[id number]]  (Abnormal) Collected: 12/25/24 0638    Lab Status: Final result Specimen: Urine, Indwelling Sparks Catheter Updated: 12/25/24 0649     Color, UA Yellow     Clarity, UA Cloudy      Specific Gravity, UA 1.025     pH, UA 6.5     Leukocytes, UA Negative     Nitrite, UA Negative     Protein, UA 30 (1+) mg/dl      Glucose, UA Negative mg/dl      Ketones, UA Trace mg/dl      Urobilinogen, UA 2.0 mg/dl      Bilirubin, UA Negative     Occult Blood, UA Large    FLU/COVID Rapid Antigen (30 min. TAT) - Preferred screening test in ED [[id number]]  (Abnormal) Collected: 12/25/24 0514    Lab Status: Final result Specimen: Nares from Nose Updated: 12/25/24 0536     SARS COV Rapid Antigen Negative     Influenza A Rapid Antigen Positive     Influenza B Rapid Antigen Negative    Narrative:      This test has been performed using the [employer] [facility] 2 FLU+SARS Antigen test under the Emergency Use Authorization (EUA). This test has been validated by the  and verified by the performing laboratory. The Willa uses lateral flow immunofluorescent sandwich assay to detect SARS-COV, Influenza A and Influenza B Antigen.     The [employer] Willa 2 SARS Antigen test does not differentiate between SARS-CoV and SARS-CoV-2.     Negative results are presumptive and may be confirmed with a molecular assay, if necessary, for patient management. Negative results do not rule out SARS-CoV-2 or influenza infection and should not be used as the sole basis for treatment or patient management decisions. A negative test result may occur if the level of antigen in a sample is below the limit of detection of this test.     Positive results are indicative of the presence of viral antigens, but do not rule out bacterial infection or co-infection with other viruses.     All test results should be used as an adjunct to clinical observations and other information available to the provider.    FOR PEDIATRIC PATIENTS - copy/paste COVID Guidelines URL to browser: [email or web address]    Comprehensive metabolic panel [[id number]]  (Abnormal) Collected: 12/25/24 0238    Lab Status: Final  result Specimen: Blood from Arm, Left Updated: 12/25/24 0300     Sodium 139 mmol/L      Potassium 4.4 mmol/L      Chloride 110 mmol/L      CO2 26 mmol/L      ANION GAP 3 mmol/L      BUN 29 mg/dL      Creatinine 1.01 mg/dL      Glucose 104 mg/dL      Calcium 8.2 mg/dL      AST 12 U/L      ALT 4 U/L      Alkaline Phosphatase 37 U/L      Total Protein 5.7 g/dL      Albumin 3.5 g/dL      Total Bilirubin 0.55 mg/dL      eGFR 65 ml/min/1.73sq m     Narrative:      National Kidney Disease Foundation guidelines for Chronic Kidney Disease (CKD):     Stage 1 with normal or high GFR (GFR > 90 mL/min/1.73 square meters)    Stage 2 Mild CKD (GFR = 60-89 mL/min/1.73 square meters)    Stage 3A Moderate CKD (GFR = 45-59 mL/min/1.73 square meters)    Stage 3B Moderate CKD (GFR = 30-44 mL/min/1.73 square meters)    Stage 4 Severe CKD (GFR = 15-29 mL/min/1.73 square meters)    Stage 5 End Stage CKD (GFR <15 mL/min/1.73 square meters)  Note: GFR calculation is accurate only with a steady state creatinine    CBC and differential [[id number]]  (Abnormal) Collected: 12/25/24 0238    Lab Status: Final result Specimen: Blood from Arm, Left Updated: 12/25/24 0247     WBC 4.59 Thousand/uL      RBC 4.31 Million/uL      Hemoglobin 12.8 g/dL      Hematocrit 39.5 %      MCV 92 fL      MCH 29.7 pg      MCHC 32.4 g/dL      RDW 13.0 %      MPV 11.2 fL      Platelets 135 Thousands/uL      nRBC 0 /100 WBCs      Segmented % 79 %      Immature Grans % 0 %      Lymphocytes % 7 %      Monocytes % 13 %      Eosinophils Relative 1 %      Basophils Relative 0 %      Absolute Neutrophils 3.59 Thousands/µL      Absolute Immature Grans 0.02 Thousand/uL      Absolute Lymphocytes 0.33 Thousands/µL      Absolute Monocytes 0.60 Thousand/µL      Eosinophils Absolute 0.03 Thousand/µL      Basophils Absolute 0.02 Thousands/µL             CT abdomen pelvis with contrast   Final Interpretation by Tino Carter DO (12/25 0612)      Enlarged and nodular  prostate indents the bladder base. Bladder wall thickening with multiple small bladder diverticula noted, suspect a component of chronic bladder outlet obstruction. Superimposed cystitis considered in the appropriate clinical    setting. Correlation with the patient symptoms, laboratory values, and urinalysis recommended.      Right-sided nephrolithiasis, duplicated right renal collecting system, no hydronephrosis.      Hepatic cysts, renal cysts, and other findings as above.               Workstation performed: [id number]             Procedures    ED Medication and Procedure Management   Prior to Admission Medications   Prescriptions Last Dose Informant Patient Reported? Taking?   Cholecalciferol 50 MCG (2000 UT) TABS  Spouse/Significant Other Yes No   Sig: Take 50 mcg by mouth daily   apixaban (Eliquis) 5 mg  Spouse/Significant Other Yes No   Sig: Take 5 mg by mouth 2 (two) times a day   atorvastatin (LIPITOR) 20 mg tablet   No No   Sig: Take 1 tablet (20 mg total) by mouth daily   carbidopa-levodopa (SINEMET)  mg per tablet  Spouse/Significant Other Yes No   Sig: TAKE 1/2 (ONE-HALF) TABLET BY MOUTH AT BEDTIME FOR 7 DAYS THEN 1/2 (ONE-HALF) TWICE DAILY FOR 7 DAYS THEN 1/2 (ONE-HALF) THREE TIMES DAILY FROM THERE AFTER   cyanocobalamin (VITAMIN B-12) 1000 MCG tablet  Spouse/Significant Other No No   Sig: Take 1 tablet (1,000 mcg total) by mouth daily   Patient not taking: Reported on 11/1/2024   guaiFENesin (ROBITUSSIN) 200 MG/10ML oral liquid  Spouse/Significant Other No No   Sig: Take 10 mL (200 mg total) by mouth every 4 (four) hours as needed (cough)   Patient not taking: Reported on 11/1/2024   levothyroxine 100 mcg tablet  Spouse/Significant Other No No   Sig: Take 1 tablet (100 mcg total) by mouth daily in the early morning   lisinopril (ZESTRIL) 2.5 mg tablet  Spouse/Significant Other No No   Sig: Take 1 tablet (2.5 mg total) by mouth daily   polyethylene glycol (MIRALAX) 17 g packet   Spouse/Significant Other No No   Sig: Take 17 g by mouth daily as needed (constipation)   rOPINIRole (REQUIP) 1 mg tablet  Spouse/Significant Other Yes No   Sig: Take 1 mg by mouth 3 (three) times a day   ticagrelor (Brilinta) 60 MG  Spouse/Significant Other Yes No   Sig: Take 60 mg by mouth every 12 (twelve) hours      Facility-Administered Medications: None     Patient's Medications   Discharge Prescriptions    No medications on file     No discharge procedures on file.  ED SEPSIS DOCUMENTATION   Time reflects when diagnosis was documented in both MDM as applicable and the Disposition within this note       Time User Action Codes Description Comment    12/25/2024  6:54 AM Randal Jones [R33.9] Urinary retention     12/25/2024  6:55 AM Randal Jones [J10.1] Influenza A                  Randal Jones MD  12/25/24 0709

## 2024-12-25 NOTE — DISCHARGE INSTRUCTIONS
At this time your Sparks catheter will be in place until you follow-up with urology.  You are also found to be positive for flu here.  Please continue Tylenol every 6 hours if you develop with fever.  Please follow-up with your primary care doctor.  Please also call one of the urology offices below to make an appointment for follow-up.    Return to ER if you develop any worsening symptoms such as difficulty breathing, worsening abdominal pain or generalized weakness that is worsening and you are unable to care for self at home.

## 2024-12-26 ENCOUNTER — TELEPHONE (OUTPATIENT)
Age: 89
End: 2024-12-26

## 2024-12-26 NOTE — TELEPHONE ENCOUNTER
Diagnosis/Complaint:Urinary retention    Insurance:[facility] Complete    History cancer:no    Previous urologist:[facility] [phone]   Dr.Lincoln Meraz    Outside testing/where:[facility]    If yes what kind:[facility]    Records requested:no    Preferred location:[place]

## 2024-12-26 NOTE — TELEPHONE ENCOUNTER
Npt's spouse calling to schedule [facility] ED follow up/balderrama catheter removal,please review records for scheduling.

## 2024-12-30 ENCOUNTER — HOSPITAL ENCOUNTER (EMERGENCY)
Facility: HOSPITAL | Age: 89
Discharge: HOME | End: 2024-12-30
Attending: EMERGENCY MEDICINE
Payer: COMMERCIAL

## 2024-12-30 VITALS
DIASTOLIC BLOOD PRESSURE: 57 MMHG | TEMPERATURE: 98.1 F | OXYGEN SATURATION: 96 % | SYSTOLIC BLOOD PRESSURE: 107 MMHG | RESPIRATION RATE: 16 BRPM | HEART RATE: 73 BPM

## 2024-12-30 DIAGNOSIS — R31.9 HEMATURIA: Primary | ICD-10-CM

## 2024-12-30 LAB
ALBUMIN SERPL BCG-MCNC: 3.6 G/DL (ref 3.5–5)
ALP SERPL-CCNC: 41 U/L (ref 34–104)
ALT SERPL W P-5'-P-CCNC: <3 U/L (ref 7–52)
ANION GAP SERPL CALCULATED.3IONS-SCNC: 4 MMOL/L (ref 4–13)
AST SERPL W P-5'-P-CCNC: 14 U/L (ref 13–39)
BASOPHILS # BLD AUTO: 0.02 THOUSANDS/ΜL (ref 0–0.1)
BASOPHILS NFR BLD AUTO: 0 % (ref 0–1)
BILIRUB SERPL-MCNC: 0.48 MG/DL (ref 0.2–1)
BUN SERPL-MCNC: 19 MG/DL (ref 5–25)
CALCIUM SERPL-MCNC: 8.4 MG/DL (ref 8.4–10.2)
CHLORIDE SERPL-SCNC: 109 MMOL/L (ref 96–108)
CO2 SERPL-SCNC: 28 MMOL/L (ref 21–32)
CREAT SERPL-MCNC: 1.03 MG/DL (ref 0.6–1.3)
EOSINOPHIL # BLD AUTO: 0.1 THOUSAND/ΜL (ref 0–0.61)
EOSINOPHIL NFR BLD AUTO: 2 % (ref 0–6)
ERYTHROCYTE [DISTWIDTH] IN BLOOD BY AUTOMATED COUNT: 12.9 % (ref 11.6–15.1)
GFR SERPL CREATININE-BSD FRML MDRD: 64 ML/MIN/1.73SQ M
GLUCOSE SERPL-MCNC: 94 MG/DL (ref 65–140)
HCT VFR BLD AUTO: 40.6 % (ref 36.5–49.3)
HGB BLD-MCNC: 13.3 G/DL (ref 12–17)
IMM GRANULOCYTES # BLD AUTO: 0.03 THOUSAND/UL (ref 0–0.2)
IMM GRANULOCYTES NFR BLD AUTO: 1 % (ref 0–2)
LYMPHOCYTES # BLD AUTO: 0.88 THOUSANDS/ΜL (ref 0.6–4.47)
LYMPHOCYTES NFR BLD AUTO: 15 % (ref 14–44)
MCH RBC QN AUTO: 29.8 PG (ref 26.8–34.3)
MCHC RBC AUTO-ENTMCNC: 32.8 G/DL (ref 31.4–37.4)
MCV RBC AUTO: 91 FL (ref 82–98)
MONOCYTES # BLD AUTO: 0.46 THOUSAND/ΜL (ref 0.17–1.22)
MONOCYTES NFR BLD AUTO: 8 % (ref 4–12)
NEUTROPHILS # BLD AUTO: 4.37 THOUSANDS/ΜL (ref 1.85–7.62)
NEUTS SEG NFR BLD AUTO: 74 % (ref 43–75)
NRBC BLD AUTO-RTO: 0 /100 WBCS
PLATELET # BLD AUTO: 179 THOUSANDS/UL (ref 149–390)
PMV BLD AUTO: 11.3 FL (ref 8.9–12.7)
POTASSIUM SERPL-SCNC: 4.4 MMOL/L (ref 3.5–5.3)
PROT SERPL-MCNC: 6.1 G/DL (ref 6.4–8.4)
RBC # BLD AUTO: 4.46 MILLION/UL (ref 3.88–5.62)
SODIUM SERPL-SCNC: 141 MMOL/L (ref 135–147)
WBC # BLD AUTO: 5.86 THOUSAND/UL (ref 4.31–10.16)

## 2024-12-30 PROCEDURE — 99283 EMERGENCY DEPT VISIT LOW MDM: CPT

## 2024-12-30 PROCEDURE — 36415 COLL VENOUS BLD VENIPUNCTURE: CPT | Performed by: EMERGENCY MEDICINE

## 2024-12-30 PROCEDURE — 99284 EMERGENCY DEPT VISIT MOD MDM: CPT | Performed by: EMERGENCY MEDICINE

## 2024-12-30 PROCEDURE — 80053 COMPREHEN METABOLIC PANEL: CPT | Performed by: EMERGENCY MEDICINE

## 2024-12-30 PROCEDURE — 85025 COMPLETE CBC W/AUTO DIFF WBC: CPT | Performed by: EMERGENCY MEDICINE

## 2024-12-30 NOTE — ED PROVIDER NOTES
Time reflects when diagnosis was documented in both MDM as applicable and the Disposition within this note       Time User Action Codes Description Comment    12/30/2024  6:57 PM Marcelino Rachel Add [R31.9] Hematuria           ED Disposition       ED Disposition   Discharge    Condition   Stable    Date/Time   Mon Dec 30, 2024  6:57 PM    Comment   Pavan Edward discharge to home/self care.                   Assessment & Plan       Medical Decision Making  Patient with intermittent hematuria ongoing in the emergency department likely related to traumatic Sparks in the setting of anticoagulation.  Patient overall well-appearing with normal vital signs, states that the Sparks has not been getting obstructed.  I ordered and reviewed lab work including CBC, CMP primarily to evaluate patient's hemoglobin.  Patient with a hemoglobin of 13.3.  Instructed to return to the emergency department if this degree of hematuria continues for 2 more full days, if he develops any constitutional symptoms, or if Sparks is not draining.  Provided with urology follow-up, discharged with return precautions.    Amount and/or Complexity of Data Reviewed  Labs: ordered.             Medications - No data to display    ED Risk Strat Scores                                              History of Present Illness       Chief Complaint   Patient presents with    Blood in Urine     New urethral cath placed on 12/25 here. Pt's wife states today bag is completely bloody. Cath is still flowing and not backed up. Pt denies pain.        Past Medical History:   Diagnosis Date    AA (aortic aneurysm) (HCC)     Coronary artery disease     CVA (cerebral vascular accident) (HCC)     Parkinsons (HCC)     Pulmonary emboli (HCC)       Past Surgical History:   Procedure Laterality Date    CATARACT EXTRACTION      CORONARY ANGIOPLASTY WITH STENT PLACEMENT        History reviewed. No pertinent family history.   Social History     Tobacco Use    Smoking status:  Never    Smokeless tobacco: Never   Vaping Use    Vaping status: Never Used   Substance Use Topics    Alcohol use: Never    Drug use: Never      E-Cigarette/Vaping    E-Cigarette Use Never User       E-Cigarette/Vaping Substances    Nicotine No     THC No     CBD No     Flavoring No     Other No     Unknown No       I have reviewed and agree with the history as documented.     Patient is an 89-year-old male history of atrial fibrillation chronically anticoagulated on Eliquis presenting for evaluation of hematuria.  Patient evaluated in the emergency department 5 days ago for abdominal pain, was found to have urinary retention and a Sparks was placed.  Patient states that he had a bit of hematuria at the time of Sparks placement which resolved and bleeding started again in the past day.  Patient denies fatigue, chest pain, shortness of breath, dysuria, hematuria, abdominal pain.  Patient's Sparks continues to drain bloody urine and has not become obstructed.        Review of Systems   Constitutional:  Negative for chills, fatigue and fever.   Respiratory:  Negative for shortness of breath.    Cardiovascular:  Negative for chest pain.   Gastrointestinal:  Negative for abdominal pain, nausea and vomiting.   Genitourinary:  Positive for hematuria. Negative for dysuria, flank pain and frequency.   Neurological:  Negative for light-headedness.   Psychiatric/Behavioral:  Negative for confusion.    All other systems reviewed and are negative.          Objective       ED Triage Vitals [12/30/24 1617]   Temperature Pulse Blood Pressure Respirations SpO2 Patient Position - Orthostatic VS   98.1 °F (36.7 °C) 73 107/57 16 96 % Sitting      Temp Source Heart Rate Source BP Location FiO2 (%) Pain Score    Tympanic Monitor Right arm -- No Pain      Vitals      Date and Time Temp Pulse SpO2 Resp BP Pain Score FACES Pain Rating User   12/30/24 1617 98.1 °F (36.7 °C) 73 96 % 16 107/57 No Pain -- PB            Physical Exam  Vitals and  nursing note reviewed.   Constitutional:       General: He is not in acute distress.     Appearance: Normal appearance. He is not ill-appearing, toxic-appearing or diaphoretic.      Comments: Well-appearing, nontoxic, nondistressed   HENT:      Head: Normocephalic and atraumatic.      Comments: Moist mucous membranes     Right Ear: External ear normal.      Left Ear: External ear normal.   Eyes:      General:         Right eye: No discharge.         Left eye: No discharge.   Cardiovascular:      Comments: Rate controlled atrial fibrillation rate in the 60s to 70s  Pulmonary:      Effort: No respiratory distress.      Comments: No increased work of breathing.  Speaking in complete sentences.  Lungs clear to auscultation bilaterally without wheezes, rales, rhonchi.  Satting 96% on room air indicating adequate oxygenation  Abdominal:      General: There is no distension.   Genitourinary:     Comments: Mild suprapubic tenderness.  No suprapubic fullness.  Gross hematuria in Sparks bag however continues to drain.  Musculoskeletal:         General: No deformity.      Cervical back: Normal range of motion.   Skin:     Findings: No lesion or rash.   Neurological:      Mental Status: He is alert and oriented to person, place, and time. Mental status is at baseline.      Comments: Awake, alert, pleasant, interactive   Psychiatric:         Mood and Affect: Mood and affect normal.         Results Reviewed       Procedure Component Value Units Date/Time    Comprehensive metabolic panel [[id number]]  (Abnormal) Collected: 12/30/24 1801    Lab Status: Final result Specimen: Blood from Arm, Left Updated: 12/30/24 1845     Sodium 141 mmol/L      Potassium 4.4 mmol/L      Chloride 109 mmol/L      CO2 28 mmol/L      ANION GAP 4 mmol/L      BUN 19 mg/dL      Creatinine 1.03 mg/dL      Glucose 94 mg/dL      Calcium 8.4 mg/dL      AST 14 U/L      ALT <3 U/L      Alkaline Phosphatase 41 U/L      Total Protein 6.1 g/dL      Albumin 3.6 g/dL       Total Bilirubin 0.48 mg/dL      eGFR 64 ml/min/1.73sq m     Narrative:      National Kidney Disease Foundation guidelines for Chronic Kidney Disease (CKD):     Stage 1 with normal or high GFR (GFR > 90 mL/min/1.73 square meters)    Stage 2 Mild CKD (GFR = 60-89 mL/min/1.73 square meters)    Stage 3A Moderate CKD (GFR = 45-59 mL/min/1.73 square meters)    Stage 3B Moderate CKD (GFR = 30-44 mL/min/1.73 square meters)    Stage 4 Severe CKD (GFR = 15-29 mL/min/1.73 square meters)    Stage 5 End Stage CKD (GFR <15 mL/min/1.73 square meters)  Note: GFR calculation is accurate only with a steady state creatinine    CBC and differential [[id number]] Collected: 12/30/24 1801    Lab Status: Final result Specimen: Blood from Arm, Left Updated: 12/30/24 1808     WBC 5.86 Thousand/uL      RBC 4.46 Million/uL      Hemoglobin 13.3 g/dL      Hematocrit 40.6 %      MCV 91 fL      MCH 29.8 pg      MCHC 32.8 g/dL      RDW 12.9 %      MPV 11.3 fL      Platelets 179 Thousands/uL      nRBC 0 /100 WBCs      Segmented % 74 %      Immature Grans % 1 %      Lymphocytes % 15 %      Monocytes % 8 %      Eosinophils Relative 2 %      Basophils Relative 0 %      Absolute Neutrophils 4.37 Thousands/µL      Absolute Immature Grans 0.03 Thousand/uL      Absolute Lymphocytes 0.88 Thousands/µL      Absolute Monocytes 0.46 Thousand/µL      Eosinophils Absolute 0.10 Thousand/µL      Basophils Absolute 0.02 Thousands/µL             No orders to display       Procedures    ED Medication and Procedure Management   Prior to Admission Medications   Prescriptions Last Dose Informant Patient Reported? Taking?   Cholecalciferol 50 MCG (2000 UT) TABS  Spouse/Significant Other Yes No   Sig: Take 50 mcg by mouth daily   apixaban (Eliquis) 5 mg  Spouse/Significant Other Yes No   Sig: Take 5 mg by mouth 2 (two) times a day   atorvastatin (LIPITOR) 20 mg tablet   No No   Sig: Take 1 tablet (20 mg total) by mouth daily   carbidopa-levodopa (SINEMET)  mg per  tablet  Spouse/Significant Other Yes No   Sig: TAKE 1/2 (ONE-HALF) TABLET BY MOUTH AT BEDTIME FOR 7 DAYS THEN 1/2 (ONE-HALF) TWICE DAILY FOR 7 DAYS THEN 1/2 (ONE-HALF) THREE TIMES DAILY FROM THERE AFTER   cyanocobalamin (VITAMIN B-12) 1000 MCG tablet  Spouse/Significant Other No No   Sig: Take 1 tablet (1,000 mcg total) by mouth daily   Patient not taking: Reported on 11/1/2024   guaiFENesin (ROBITUSSIN) 200 MG/10ML oral liquid  Spouse/Significant Other No No   Sig: Take 10 mL (200 mg total) by mouth every 4 (four) hours as needed (cough)   Patient not taking: Reported on 11/1/2024   levothyroxine 100 mcg tablet  Spouse/Significant Other No No   Sig: Take 1 tablet (100 mcg total) by mouth daily in the early morning   lisinopril (ZESTRIL) 2.5 mg tablet  Spouse/Significant Other No No   Sig: Take 1 tablet (2.5 mg total) by mouth daily   polyethylene glycol (MIRALAX) 17 g packet  Spouse/Significant Other No No   Sig: Take 17 g by mouth daily as needed (constipation)   rOPINIRole (REQUIP) 1 mg tablet  Spouse/Significant Other Yes No   Sig: Take 1 mg by mouth 3 (three) times a day   ticagrelor (Brilinta) 60 MG  Spouse/Significant Other Yes No   Sig: Take 60 mg by mouth every 12 (twelve) hours      Facility-Administered Medications: None     Patient's Medications   Discharge Prescriptions    No medications on file     No discharge procedures on file.  ED SEPSIS DOCUMENTATION   Time reflects when diagnosis was documented in both MDM as applicable and the Disposition within this note       Time User Action Codes Description Comment    12/30/2024  6:57 PM Marcelino Rachel Add [R31.9] Hematuria                  Marcelino Rachel MD  12/30/24 9771

## 2024-12-30 NOTE — DISCHARGE INSTRUCTIONS
Your hematuria is likely related to Sparks being placed.  If you are still having persistent dark hematuria for 2 more days, if you feel lightheaded, have chest pain, shortness of breath, if your Sparks bag is not draining, return to the emergency department.  Otherwise call the provided number to schedule appointment with urologist Dr. Cherry

## 2024-12-31 NOTE — TELEPHONE ENCOUNTER
I do not see a NP spot at [place] or [place] in the morning as the request is for pt to get a TOV next week. Please advise.

## 2025-01-02 NOTE — PROGRESS NOTES
Pavan Edward is a(n) 89 y.o. male. , :  1935    Subjective     Assessment:  The primary encounter diagnosis was Benign prostatic hyperplasia with urinary retention. Diagnoses of Renal stone, Renal duplicate collecting system, right, and Constipation, unspecified constipation type were also pertinent to this visit.  Urinary retention   Urinary retention with balderrama placement 24  Hx of laser prostate procedure in [place] 2017  Constipation issues and would have BM every 6 days.   Tamsulosin 0.4mg = dizziness   Balderrama removed in AM 25 and no void when return in afternoon.  18fr coude placed without difficulty.  Was on Keflex 500mg after removed in am.       Gross hematuria   24 intermittent hematuria ongoing in the emergency department likely related to traumatic Balderrama in the setting of anticoagulation     Renal stone   Non obstructing right renal stone on CT 24  Duplicate right collecting system       Medical co morbidities   Parkinsons dz, advanced age, A fib on Eliquis, CVA hx, ambulatory dysfunction     Urinary incontinence  Baseline urinary incontinence.  Was sleeping throughout the night with the balderrama cath    Tinea  Right inguinal tinea         Plan:  Keflex 500mg tonight.  Hold remaining four days of Keflex 500mg and start the day of repeat voiding trial in ~ 2 wks.   Pt to start Miralax daily and f/u with PCP or GI for his chronic constipation issues that may be contributing to his retention.  Pt to drink fluids then night before and prior to future voiding trial.   May consider finasteride 5mg   Continue to drink fluids to help with voiding and stone prevention   Hold on tamsulosin due to his prior hypotension on medication and ambulatory dysfunction  Use OTC antifungal powder twice a day to right inguinal tinea.      Radiology  24 CT ap w CTS  KIDNEYS/URETERS: Simple renal cyst(s). Duplicated right renal collecting system. Tiny nonobstructing stone in the upper pole of the  "right kidney. Otherwise unremarkable kidneys. No hydronephrosis.      History  Urinary retention  Flomax = postural hypotension   Renal calculus  Duplicate right renal collection system  Urinary incontinence     Past  surgical history   2017 laser prostate procedure in [place]   Right stent and stone surgery 2022 in [place]         1/7/2025 OV [facility] Mendez  88 y/o male with A fib on Eliquis, HTN, CAD, Parkinson's dz, ambulatory dysfunction, CVA hx, aortic aneurysm, PE,  had balderrama placed in ED 12/25/24.  Was later seen by the ED 12/30/24 for intermittent hematuria ongoing in the emergency department likely related to traumatic Balderrama in the setting of anticoagulation.  Presents for initial urologic evaluation and determine if voiding trial voiding trial is in his best interest.  Prior hx of laser prostate surgery in 2017 and stone surgery in 2020 in [place].  For the past 4-5 years been incontinent of urinary and using pads.  When he had the balderrama placed was having constipation issues at that time as well.     CT images reviewed with pt, wife, and son.  Discussed his BPH, constipation, Parkinson's dx, and CVA hx all may be contributing to his voiding issues.      Procedure: AM   Balderrama catheter removed after discussion with patient and family for voiding trial.  Bladder was irrigated with 50 cc sterile water prior to Balderrama being removed.  Urine itself was slightly cloudy.     Procedure PM office visit   Pt with no void despite drinking fluids.  Discussed with patient, wife, and son about continuing the antibiotics and possibly going to ER later tonight if unable to void versus placing Balderrama catheter.  All in agreement to place Balderrama catheter and repeat voiding trial.    Review of Systems    No results found for: \"PSA\"  No results found for: \"TESTOSTERONE\"  No components found for: \"CR\"  No results found for: \"HBA1C\"    Objective     /60 (BP Location: Left arm, Patient Position: Sitting, Cuff Size: Large)   Ht 6' 1\" " (1.854 m)   Wt 80.7 kg (178 lb)   BMI 23.48 kg/m²     Physical Exam  HENT:      Head: Normocephalic.   Pulmonary:      Effort: Pulmonary effort is normal.   Abdominal:      General: Abdomen is flat.   Genitourinary:     Comments: Prostate  Grade II  Soft, NT  No nodules    Hard stool noted in rectal vault       Right inguinal tinea present   Skin:     General: Skin is warm.   Psychiatric:         Mood and Affect: Mood normal.           [facility]

## 2025-01-07 ENCOUNTER — OFFICE VISIT (OUTPATIENT)
Dept: UROLOGY | Facility: CLINIC | Age: OVER 89
End: 2025-01-07
Payer: COMMERCIAL

## 2025-01-07 VITALS
SYSTOLIC BLOOD PRESSURE: 112 MMHG | DIASTOLIC BLOOD PRESSURE: 60 MMHG | HEIGHT: 73 IN | WEIGHT: 178 LBS | BODY MASS INDEX: 23.59 KG/M2

## 2025-01-07 DIAGNOSIS — K59.00 CONSTIPATION, UNSPECIFIED CONSTIPATION TYPE: ICD-10-CM

## 2025-01-07 DIAGNOSIS — N40.1 BENIGN PROSTATIC HYPERPLASIA WITH URINARY RETENTION: Primary | ICD-10-CM

## 2025-01-07 DIAGNOSIS — Q62.5 RENAL DUPLICATE COLLECTING SYSTEM, RIGHT: ICD-10-CM

## 2025-01-07 DIAGNOSIS — R33.8 BENIGN PROSTATIC HYPERPLASIA WITH URINARY RETENTION: Primary | ICD-10-CM

## 2025-01-07 DIAGNOSIS — N20.0 RENAL STONE: ICD-10-CM

## 2025-01-07 PROBLEM — R33.9 URINARY RETENTION: Status: ACTIVE | Noted: 2025-01-07

## 2025-01-07 PROCEDURE — 99204 OFFICE O/P NEW MOD 45 MIN: CPT | Performed by: PHYSICIAN ASSISTANT

## 2025-01-07 RX ORDER — CEPHALEXIN 500 MG/1
500 CAPSULE ORAL EVERY 12 HOURS SCHEDULED
Qty: 10 CAPSULE | Refills: 0 | Status: SHIPPED | OUTPATIENT
Start: 2025-01-07 | End: 2025-01-17

## 2025-01-07 NOTE — PROGRESS NOTES
Universal Protocol:  Consent: Verbal consent obtained.  Consent given by: patient and guardian  Patient understanding: patient states understanding of the procedure being performed  Patient identity confirmed: verbally with patient    Bladder catheterization    Date/Time: 1/7/2025 8:20 AM    Performed by: Hilton Mendez PA-C  Authorized by: Hilton Mendez PA-C    Patient location:  Bedside  Consent:     Consent given by:  Patient and guardian  Universal protocol:     Procedure explained and questions answered to patient or proxy's satisfaction: yes      Patient identity confirmed:  Verbally with patient  Anesthesia (see MAR for exact dosages):     Anesthesia method:  None  Procedure details:     Bladder irrigation: no      Approach: natural orifice      Catheter type:  Coude    Catheter size:  18 Fr    Number of attempts:  1    Successful placement: yes      Urine characteristics:  Yellow    Provider performed due to:  Nurse unavailable  Post-procedure details:     Patient tolerance of procedure:  Tolerated well, no immediate complications

## 2025-01-10 ENCOUNTER — HOSPITAL ENCOUNTER (INPATIENT)
Facility: HOSPITAL | Age: OVER 89
LOS: 7 days | Discharge: NURSING, REHAB OR LONG-TERM CARE | End: 2025-01-17
Attending: EMERGENCY MEDICINE
Payer: COMMERCIAL

## 2025-01-10 DIAGNOSIS — R31.9 HEMATURIA: ICD-10-CM

## 2025-01-10 DIAGNOSIS — R53.1 WEAKNESS: Primary | ICD-10-CM

## 2025-01-10 DIAGNOSIS — R33.9 URINARY RETENTION: ICD-10-CM

## 2025-01-10 DIAGNOSIS — R78.81 BACTEREMIA: ICD-10-CM

## 2025-01-10 DIAGNOSIS — R25.1 PILL ROLLING TREMORS: ICD-10-CM

## 2025-01-10 DIAGNOSIS — N39.0 UTI (URINARY TRACT INFECTION): ICD-10-CM

## 2025-01-10 LAB
2HR DELTA HS TROPONIN: 1 NG/L
ALBUMIN SERPL BCG-MCNC: 3.6 G/DL (ref 3.5–5)
ALP SERPL-CCNC: 41 U/L (ref 34–104)
ALT SERPL W P-5'-P-CCNC: 3 U/L (ref 7–52)
AMORPH URATE CRY URNS QL MICRO: ABNORMAL
ANION GAP SERPL CALCULATED.3IONS-SCNC: 3 MMOL/L (ref 4–13)
AST SERPL W P-5'-P-CCNC: 10 U/L (ref 13–39)
ATRIAL RATE: 87 BPM
BACTERIA UR QL AUTO: ABNORMAL /HPF
BASOPHILS # BLD AUTO: 0.03 THOUSANDS/ΜL (ref 0–0.1)
BASOPHILS NFR BLD AUTO: 0 % (ref 0–1)
BILIRUB SERPL-MCNC: 0.81 MG/DL (ref 0.2–1)
BILIRUB UR QL STRIP: NEGATIVE
BUN SERPL-MCNC: 25 MG/DL (ref 5–25)
CALCIUM SERPL-MCNC: 8.4 MG/DL (ref 8.4–10.2)
CARDIAC TROPONIN I PNL SERPL HS: 4 NG/L (ref ?–50)
CARDIAC TROPONIN I PNL SERPL HS: 5 NG/L (ref ?–50)
CHLORIDE SERPL-SCNC: 108 MMOL/L (ref 96–108)
CLARITY UR: CLEAR
CO2 SERPL-SCNC: 26 MMOL/L (ref 21–32)
COLOR UR: YELLOW
CREAT SERPL-MCNC: 0.99 MG/DL (ref 0.6–1.3)
EOSINOPHIL # BLD AUTO: 0.09 THOUSAND/ΜL (ref 0–0.61)
EOSINOPHIL NFR BLD AUTO: 1 % (ref 0–6)
ERYTHROCYTE [DISTWIDTH] IN BLOOD BY AUTOMATED COUNT: 12.7 % (ref 11.6–15.1)
FLUAV AG UPPER RESP QL IA.RAPID: NEGATIVE
FLUBV AG UPPER RESP QL IA.RAPID: NEGATIVE
GFR SERPL CREATININE-BSD FRML MDRD: 67 ML/MIN/1.73SQ M
GLUCOSE SERPL-MCNC: 131 MG/DL (ref 65–140)
GLUCOSE SERPL-MCNC: 141 MG/DL (ref 65–140)
GLUCOSE UR STRIP-MCNC: NEGATIVE MG/DL
HCT VFR BLD AUTO: 36.7 % (ref 36.5–49.3)
HGB BLD-MCNC: 12 G/DL (ref 12–17)
HGB UR QL STRIP.AUTO: ABNORMAL
IMM GRANULOCYTES # BLD AUTO: 0.1 THOUSAND/UL (ref 0–0.2)
IMM GRANULOCYTES NFR BLD AUTO: 1 % (ref 0–2)
KETONES UR STRIP-MCNC: NEGATIVE MG/DL
LACTATE SERPL-SCNC: 0.6 MMOL/L (ref 0.5–2)
LEUKOCYTE ESTERASE UR QL STRIP: ABNORMAL
LG PLATELETS BLD QL SMEAR: PRESENT
LYMPHOCYTES # BLD AUTO: 0.3 THOUSANDS/ΜL (ref 0.6–4.47)
LYMPHOCYTES NFR BLD AUTO: 2 % (ref 14–44)
MAGNESIUM SERPL-MCNC: 1.9 MG/DL (ref 1.9–2.7)
MCH RBC QN AUTO: 29.6 PG (ref 26.8–34.3)
MCHC RBC AUTO-ENTMCNC: 32.7 G/DL (ref 31.4–37.4)
MCV RBC AUTO: 90 FL (ref 82–98)
MONOCYTES # BLD AUTO: 0.72 THOUSAND/ΜL (ref 0.17–1.22)
MONOCYTES NFR BLD AUTO: 4 % (ref 4–12)
NEUTROPHILS # BLD AUTO: 16.48 THOUSANDS/ΜL (ref 1.85–7.62)
NEUTS SEG NFR BLD AUTO: 92 % (ref 43–75)
NITRITE UR QL STRIP: POSITIVE
NON-SQ EPI CELLS URNS QL MICRO: ABNORMAL /HPF
NRBC BLD AUTO-RTO: 0 /100 WBCS
P AXIS: 87 DEGREES
PH UR STRIP.AUTO: 6.5 [PH]
PLATELET # BLD AUTO: 167 THOUSANDS/UL (ref 149–390)
PLATELET BLD QL SMEAR: ADEQUATE
PMV BLD AUTO: 11.1 FL (ref 8.9–12.7)
POTASSIUM SERPL-SCNC: 4.5 MMOL/L (ref 3.5–5.3)
PR INTERVAL: 212 MS
PROT SERPL-MCNC: 5.8 G/DL (ref 6.4–8.4)
PROT UR STRIP-MCNC: ABNORMAL MG/DL
QRS AXIS: -62 DEGREES
QRSD INTERVAL: 112 MS
QT INTERVAL: 396 MS
QTC INTERVAL: 476 MS
RBC # BLD AUTO: 4.06 MILLION/UL (ref 3.88–5.62)
RBC #/AREA URNS AUTO: ABNORMAL /HPF
RBC MORPH BLD: NORMAL
SARS-COV+SARS-COV-2 AG RESP QL IA.RAPID: NEGATIVE
SODIUM SERPL-SCNC: 137 MMOL/L (ref 135–147)
SP GR UR STRIP.AUTO: 1.02 (ref 1–1.03)
T WAVE AXIS: 79 DEGREES
TSH SERPL DL<=0.05 MIU/L-ACNC: 3.54 UIU/ML (ref 0.45–4.5)
UROBILINOGEN UR STRIP-ACNC: 2 MG/DL
VENTRICULAR RATE: 87 BPM
WBC # BLD AUTO: 17.72 THOUSAND/UL (ref 4.31–10.16)
WBC #/AREA URNS AUTO: ABNORMAL /HPF

## 2025-01-10 PROCEDURE — 84443 ASSAY THYROID STIM HORMONE: CPT | Performed by: EMERGENCY MEDICINE

## 2025-01-10 PROCEDURE — 87040 BLOOD CULTURE FOR BACTERIA: CPT | Performed by: EMERGENCY MEDICINE

## 2025-01-10 PROCEDURE — 82948 REAGENT STRIP/BLOOD GLUCOSE: CPT

## 2025-01-10 PROCEDURE — 96375 TX/PRO/DX INJ NEW DRUG ADDON: CPT

## 2025-01-10 PROCEDURE — 93005 ELECTROCARDIOGRAM TRACING: CPT

## 2025-01-10 PROCEDURE — 85025 COMPLETE CBC W/AUTO DIFF WBC: CPT | Performed by: EMERGENCY MEDICINE

## 2025-01-10 PROCEDURE — 87086 URINE CULTURE/COLONY COUNT: CPT

## 2025-01-10 PROCEDURE — 87186 SC STD MICRODIL/AGAR DIL: CPT

## 2025-01-10 PROCEDURE — 84484 ASSAY OF TROPONIN QUANT: CPT | Performed by: EMERGENCY MEDICINE

## 2025-01-10 PROCEDURE — 83605 ASSAY OF LACTIC ACID: CPT | Performed by: EMERGENCY MEDICINE

## 2025-01-10 PROCEDURE — 36415 COLL VENOUS BLD VENIPUNCTURE: CPT | Performed by: EMERGENCY MEDICINE

## 2025-01-10 PROCEDURE — 87154 CUL TYP ID BLD PTHGN 6+ TRGT: CPT | Performed by: EMERGENCY MEDICINE

## 2025-01-10 PROCEDURE — 83735 ASSAY OF MAGNESIUM: CPT | Performed by: EMERGENCY MEDICINE

## 2025-01-10 PROCEDURE — 99285 EMERGENCY DEPT VISIT HI MDM: CPT | Performed by: EMERGENCY MEDICINE

## 2025-01-10 PROCEDURE — 80053 COMPREHEN METABOLIC PANEL: CPT | Performed by: EMERGENCY MEDICINE

## 2025-01-10 PROCEDURE — 87077 CULTURE AEROBIC IDENTIFY: CPT

## 2025-01-10 PROCEDURE — 96365 THER/PROPH/DIAG IV INF INIT: CPT

## 2025-01-10 PROCEDURE — 87147 CULTURE TYPE IMMUNOLOGIC: CPT

## 2025-01-10 PROCEDURE — 99223 1ST HOSP IP/OBS HIGH 75: CPT

## 2025-01-10 PROCEDURE — 81001 URINALYSIS AUTO W/SCOPE: CPT | Performed by: EMERGENCY MEDICINE

## 2025-01-10 PROCEDURE — 87077 CULTURE AEROBIC IDENTIFY: CPT | Performed by: EMERGENCY MEDICINE

## 2025-01-10 PROCEDURE — 87804 INFLUENZA ASSAY W/OPTIC: CPT | Performed by: EMERGENCY MEDICINE

## 2025-01-10 PROCEDURE — 99285 EMERGENCY DEPT VISIT HI MDM: CPT

## 2025-01-10 PROCEDURE — 87811 SARS-COV-2 COVID19 W/OPTIC: CPT | Performed by: EMERGENCY MEDICINE

## 2025-01-10 PROCEDURE — 87186 SC STD MICRODIL/AGAR DIL: CPT | Performed by: EMERGENCY MEDICINE

## 2025-01-10 RX ORDER — POLYETHYLENE GLYCOL 3350 17 G/17G
17 POWDER, FOR SOLUTION ORAL DAILY
Status: DISCONTINUED | OUTPATIENT
Start: 2025-01-11 | End: 2025-01-11

## 2025-01-10 RX ORDER — LORAZEPAM 2 MG/ML
0.25 INJECTION INTRAMUSCULAR ONCE
Status: COMPLETED | OUTPATIENT
Start: 2025-01-10 | End: 2025-01-10

## 2025-01-10 RX ORDER — ACETAMINOPHEN 325 MG/1
650 TABLET ORAL EVERY 6 HOURS PRN
Status: DISCONTINUED | OUTPATIENT
Start: 2025-01-10 | End: 2025-01-17 | Stop reason: HOSPADM

## 2025-01-10 RX ORDER — POLYETHYLENE GLYCOL 3350 17 G/17G
17 POWDER, FOR SOLUTION ORAL DAILY PRN
Status: DISCONTINUED | OUTPATIENT
Start: 2025-01-10 | End: 2025-01-17 | Stop reason: HOSPADM

## 2025-01-10 RX ORDER — DOCUSATE SODIUM 100 MG/1
100 CAPSULE, LIQUID FILLED ORAL 2 TIMES DAILY
Status: DISCONTINUED | OUTPATIENT
Start: 2025-01-11 | End: 2025-01-17 | Stop reason: HOSPADM

## 2025-01-10 RX ORDER — SODIUM CHLORIDE 9 MG/ML
75 INJECTION, SOLUTION INTRAVENOUS CONTINUOUS
Status: DISCONTINUED | OUTPATIENT
Start: 2025-01-10 | End: 2025-01-15

## 2025-01-10 RX ORDER — LEVOTHYROXINE SODIUM 100 UG/1
100 TABLET ORAL
Status: DISCONTINUED | OUTPATIENT
Start: 2025-01-11 | End: 2025-01-17 | Stop reason: HOSPADM

## 2025-01-10 RX ORDER — GABAPENTIN 100 MG/1
100 CAPSULE ORAL
Status: DISCONTINUED | OUTPATIENT
Start: 2025-01-10 | End: 2025-01-17 | Stop reason: HOSPADM

## 2025-01-10 RX ORDER — SENNOSIDES 8.6 MG
1 TABLET ORAL DAILY
Status: DISCONTINUED | OUTPATIENT
Start: 2025-01-11 | End: 2025-01-13

## 2025-01-10 RX ORDER — CEFTRIAXONE 1 G/50ML
1000 INJECTION, SOLUTION INTRAVENOUS EVERY 24 HOURS
Status: DISCONTINUED | OUTPATIENT
Start: 2025-01-11 | End: 2025-01-12

## 2025-01-10 RX ORDER — ONDANSETRON 2 MG/ML
4 INJECTION INTRAMUSCULAR; INTRAVENOUS EVERY 6 HOURS PRN
Status: DISCONTINUED | OUTPATIENT
Start: 2025-01-10 | End: 2025-01-17 | Stop reason: HOSPADM

## 2025-01-10 RX ORDER — LISINOPRIL 2.5 MG/1
2.5 TABLET ORAL DAILY
Status: DISCONTINUED | OUTPATIENT
Start: 2025-01-11 | End: 2025-01-12

## 2025-01-10 RX ORDER — CEFTRIAXONE 1 G/50ML
1000 INJECTION, SOLUTION INTRAVENOUS ONCE
Status: COMPLETED | OUTPATIENT
Start: 2025-01-10 | End: 2025-01-10

## 2025-01-10 RX ORDER — CARBIDOPA AND LEVODOPA 25; 100 MG/1; MG/1
1 TABLET ORAL 3 TIMES DAILY
Status: DISCONTINUED | OUTPATIENT
Start: 2025-01-10 | End: 2025-01-17 | Stop reason: HOSPADM

## 2025-01-10 RX ORDER — HEPARIN SODIUM 5000 [USP'U]/ML
5000 INJECTION, SOLUTION INTRAVENOUS; SUBCUTANEOUS EVERY 8 HOURS SCHEDULED
Status: DISCONTINUED | OUTPATIENT
Start: 2025-01-10 | End: 2025-01-10

## 2025-01-10 RX ADMIN — SODIUM CHLORIDE 75 ML/HR: 0.9 INJECTION, SOLUTION INTRAVENOUS at 23:12

## 2025-01-10 RX ADMIN — CARBIDOPA AND LEVODOPA 1 TABLET: 25; 100 TABLET ORAL at 23:10

## 2025-01-10 RX ADMIN — LORAZEPAM 0.25 MG: 2 INJECTION INTRAMUSCULAR; INTRAVENOUS at 19:35

## 2025-01-10 RX ADMIN — CEFTRIAXONE 1000 MG: 1 INJECTION, SOLUTION INTRAVENOUS at 20:26

## 2025-01-10 RX ADMIN — GABAPENTIN 100 MG: 100 CAPSULE ORAL at 23:10

## 2025-01-11 ENCOUNTER — APPOINTMENT (INPATIENT)
Dept: RADIOLOGY | Facility: HOSPITAL | Age: OVER 89
End: 2025-01-11
Payer: COMMERCIAL

## 2025-01-11 PROBLEM — M79.604 PAIN IN BOTH LOWER EXTREMITIES: Status: ACTIVE | Noted: 2025-01-11

## 2025-01-11 PROBLEM — R31.0 GROSS HEMATURIA: Status: ACTIVE | Noted: 2025-01-11

## 2025-01-11 PROBLEM — N39.0 COMPLICATED UTI (URINARY TRACT INFECTION): Status: ACTIVE | Noted: 2025-01-11

## 2025-01-11 PROBLEM — M79.605 PAIN IN BOTH LOWER EXTREMITIES: Status: ACTIVE | Noted: 2025-01-11

## 2025-01-11 PROBLEM — I73.9 PAD (PERIPHERAL ARTERY DISEASE) (HCC): Status: ACTIVE | Noted: 2025-01-11

## 2025-01-11 LAB
ALBUMIN SERPL BCG-MCNC: 3 G/DL (ref 3.5–5)
ALP SERPL-CCNC: 39 U/L (ref 34–104)
ALT SERPL W P-5'-P-CCNC: 3 U/L (ref 7–52)
ANION GAP SERPL CALCULATED.3IONS-SCNC: 5 MMOL/L (ref 4–13)
AST SERPL W P-5'-P-CCNC: 8 U/L (ref 13–39)
BASOPHILS # BLD AUTO: 0.03 THOUSANDS/ΜL (ref 0–0.1)
BASOPHILS NFR BLD AUTO: 0 % (ref 0–1)
BILIRUB SERPL-MCNC: 1.09 MG/DL (ref 0.2–1)
BUN SERPL-MCNC: 26 MG/DL (ref 5–25)
CALCIUM ALBUM COR SERPL-MCNC: 8.3 MG/DL (ref 8.3–10.1)
CALCIUM SERPL-MCNC: 7.5 MG/DL (ref 8.4–10.2)
CHLORIDE SERPL-SCNC: 108 MMOL/L (ref 96–108)
CO2 SERPL-SCNC: 26 MMOL/L (ref 21–32)
CREAT SERPL-MCNC: 1.07 MG/DL (ref 0.6–1.3)
EOSINOPHIL # BLD AUTO: 0.01 THOUSAND/ΜL (ref 0–0.61)
EOSINOPHIL NFR BLD AUTO: 0 % (ref 0–6)
ERYTHROCYTE [DISTWIDTH] IN BLOOD BY AUTOMATED COUNT: 12.7 % (ref 11.6–15.1)
GFR SERPL CREATININE-BSD FRML MDRD: 61 ML/MIN/1.73SQ M
GLUCOSE SERPL-MCNC: 112 MG/DL (ref 65–140)
HCT VFR BLD AUTO: 35.6 % (ref 36.5–49.3)
HGB BLD-MCNC: 11.4 G/DL (ref 12–17)
IMM GRANULOCYTES # BLD AUTO: 0.1 THOUSAND/UL (ref 0–0.2)
IMM GRANULOCYTES NFR BLD AUTO: 1 % (ref 0–2)
LYMPHOCYTES # BLD AUTO: 0.47 THOUSANDS/ΜL (ref 0.6–4.47)
LYMPHOCYTES NFR BLD AUTO: 3 % (ref 14–44)
MCH RBC QN AUTO: 29 PG (ref 26.8–34.3)
MCHC RBC AUTO-ENTMCNC: 32 G/DL (ref 31.4–37.4)
MCV RBC AUTO: 91 FL (ref 82–98)
MONOCYTES # BLD AUTO: 1.39 THOUSAND/ΜL (ref 0.17–1.22)
MONOCYTES NFR BLD AUTO: 8 % (ref 4–12)
NEUTROPHILS # BLD AUTO: 15.62 THOUSANDS/ΜL (ref 1.85–7.62)
NEUTS SEG NFR BLD AUTO: 88 % (ref 43–75)
NRBC BLD AUTO-RTO: 0 /100 WBCS
PLATELET # BLD AUTO: 171 THOUSANDS/UL (ref 149–390)
PMV BLD AUTO: 11.5 FL (ref 8.9–12.7)
POTASSIUM SERPL-SCNC: 4.6 MMOL/L (ref 3.5–5.3)
PROT SERPL-MCNC: 5.3 G/DL (ref 6.4–8.4)
RBC # BLD AUTO: 3.93 MILLION/UL (ref 3.88–5.62)
SODIUM SERPL-SCNC: 139 MMOL/L (ref 135–147)
WBC # BLD AUTO: 17.62 THOUSAND/UL (ref 4.31–10.16)

## 2025-01-11 PROCEDURE — 80053 COMPREHEN METABOLIC PANEL: CPT

## 2025-01-11 PROCEDURE — 99232 SBSQ HOSP IP/OBS MODERATE 35: CPT

## 2025-01-11 PROCEDURE — 85025 COMPLETE CBC W/AUTO DIFF WBC: CPT

## 2025-01-11 PROCEDURE — 93970 EXTREMITY STUDY: CPT

## 2025-01-11 PROCEDURE — 93970 EXTREMITY STUDY: CPT | Performed by: SURGERY

## 2025-01-11 RX ORDER — POLYETHYLENE GLYCOL 3350 17 G/17G
17 POWDER, FOR SOLUTION ORAL
Status: DISCONTINUED | OUTPATIENT
Start: 2025-01-11 | End: 2025-01-17 | Stop reason: HOSPADM

## 2025-01-11 RX ORDER — TAMSULOSIN HYDROCHLORIDE 0.4 MG/1
0.4 CAPSULE ORAL
Status: DISCONTINUED | OUTPATIENT
Start: 2025-01-11 | End: 2025-01-17 | Stop reason: HOSPADM

## 2025-01-11 RX ORDER — AMLODIPINE BESYLATE 2.5 MG/1
2.5 TABLET ORAL DAILY
Status: DISCONTINUED | OUTPATIENT
Start: 2025-01-12 | End: 2025-01-12

## 2025-01-11 RX ORDER — POLYETHYLENE GLYCOL 3350 17 G/17G
17 POWDER, FOR SOLUTION ORAL ONCE
Status: COMPLETED | OUTPATIENT
Start: 2025-01-11 | End: 2025-01-11

## 2025-01-11 RX ADMIN — POLYETHYLENE GLYCOL 3350 17 G: 17 POWDER, FOR SOLUTION ORAL at 22:31

## 2025-01-11 RX ADMIN — CHOLECALCIFEROL (VITAMIN D3) 10 MCG (400 UNIT) TABLET 2000 UNITS: at 09:23

## 2025-01-11 RX ADMIN — CARBIDOPA AND LEVODOPA 1 TABLET: 25; 100 TABLET ORAL at 09:19

## 2025-01-11 RX ADMIN — GABAPENTIN 100 MG: 100 CAPSULE ORAL at 22:31

## 2025-01-11 RX ADMIN — CARBIDOPA AND LEVODOPA 1 TABLET: 25; 100 TABLET ORAL at 16:11

## 2025-01-11 RX ADMIN — CEFTRIAXONE 1000 MG: 1 INJECTION, SOLUTION INTRAVENOUS at 22:33

## 2025-01-11 RX ADMIN — DOCUSATE SODIUM 100 MG: 100 CAPSULE, LIQUID FILLED ORAL at 17:13

## 2025-01-11 RX ADMIN — LEVOTHYROXINE SODIUM 100 MCG: 100 TABLET ORAL at 06:13

## 2025-01-11 RX ADMIN — TICAGRELOR 60 MG: 60 TABLET ORAL at 09:19

## 2025-01-11 RX ADMIN — APIXABAN 5 MG: 5 TABLET, FILM COATED ORAL at 17:13

## 2025-01-11 RX ADMIN — POLYETHYLENE GLYCOL 3350 17 G: 17 POWDER, FOR SOLUTION ORAL at 09:19

## 2025-01-11 RX ADMIN — DOCUSATE SODIUM 100 MG: 100 CAPSULE, LIQUID FILLED ORAL at 09:19

## 2025-01-11 RX ADMIN — CARBIDOPA AND LEVODOPA 1 TABLET: 25; 100 TABLET ORAL at 22:31

## 2025-01-11 RX ADMIN — TICAGRELOR 60 MG: 60 TABLET ORAL at 22:30

## 2025-01-11 RX ADMIN — TAMSULOSIN HYDROCHLORIDE 0.4 MG: 0.4 CAPSULE ORAL at 16:10

## 2025-01-11 RX ADMIN — SENNOSIDES 8.6 MG: 8.6 TABLET, FILM COATED ORAL at 09:19

## 2025-01-11 RX ADMIN — LISINOPRIL 2.5 MG: 2.5 TABLET ORAL at 09:18

## 2025-01-11 RX ADMIN — APIXABAN 5 MG: 5 TABLET, FILM COATED ORAL at 09:19

## 2025-01-11 NOTE — ASSESSMENT & PLAN NOTE
POA with generalized weakness and worsening parkinson symptoms the last 3 days  Follows with urology, benign prostate hyperplasia with urinary retention , Balderrama catheter in place in Ed on 12/25/24  Chart review performed: Seen in urology's office 1/7/2025: Urinary retention  for voiding trial failed balderrama replaced  In Ed noted, Leukocytosis WBCs 17.72, lactic acid and Pro-Zi negative, afebrile  UA positive for nitrates, blood, micro positive for innumerable RBC, WBC, and moderate bacteria     Denies any abdominal pain, pelvic pain no hematuria noted  Continue ceftriaxone  Follow-up with urine culture  Balderrama's catheter remains in place.  Consult urology  Interval follow-up  Trend fevers, CBC, vitals

## 2025-01-11 NOTE — ASSESSMENT & PLAN NOTE
Followed by vascular surgery  Home medicaiton  Brilinta 60 mg every 12 hours, per wife on hold in setting of hematuria and she recently started yesterday

## 2025-01-11 NOTE — ASSESSMENT & PLAN NOTE
Follow with cardiology  Denies chest pain or palpations   S/p PCI x2 on 8/13/2023  Brilinta was on hold on 12/24 per ED given gross hematuria.  However it was traumatic secondary to Sparks's catheter insertion.  Currently hematuria cleared per bedside exam and patient.  Restart Brilinta

## 2025-01-11 NOTE — ED PROVIDER NOTES
Time reflects when diagnosis was documented in both MDM as applicable and the Disposition within this note       Time User Action Codes Description Comment    1/10/2025  7:21 PM AnepuJuanArlin Add [R53.1] Weakness     1/10/2025  7:22 PM Anepu, Arlin Add [R25.1] Pill rolling tremors     1/10/2025  9:03 PM Anepu, Arlin Add [N39.0] UTI (urinary tract infection)           ED Disposition       ED Disposition   Admit    Condition   Stable    Date/Time   Fri Conner 10, 2025  9:03 PM    Comment                  Assessment & Plan       Medical Decision Making  Patient evaluated with EKG labs.  Given IV antibiotics admitted to the medicine service for further evaluation and management.    Problems Addressed:  Pill rolling tremors: acute illness or injury  UTI (urinary tract infection): acute illness or injury  Weakness: acute illness or injury    Amount and/or Complexity of Data Reviewed  External Data Reviewed: notes.  Labs: ordered. Decision-making details documented in ED Course.  ECG/medicine tests: ordered and independent interpretation performed. Decision-making details documented in ED Course.    Risk  Prescription drug management.  Decision regarding hospitalization.             Medications   LORazepam (ATIVAN) injection 0.25 mg (0.25 mg Intravenous Given 1/1935)   cefTRIAXone (ROCEPHIN) IVPB (premix in dextrose) 1,000 mg 50 mL (0 mg Intravenous Stopped 1/10/25 2056)       ED Risk Strat Scores                          SBIRT 20yo+      Flowsheet Row Most Recent Value   Initial Alcohol Screen: US AUDIT-C     1. How often do you have a drink containing alcohol? 0 Filed at: 01/10/2025 1903   2. How many drinks containing alcohol do you have on a typical day you are drinking?  0 Filed at: 01/10/2025 1903   3a. Male UNDER 65: How often do you have five or more drinks on one occasion? 0 Filed at: 01/10/2025 1903   3b. FEMALE Any Age, or MALE 65+: How often do you have 4 or more drinks on one occassion? 0 Filed at:  01/10/2025 1903   Audit-C Score 0 Filed at: 01/10/2025 1903   ARNALDO: How many times in the past year have you...    Used an illegal drug or used a prescription medication for non-medical reasons? Never Filed at: 01/10/2025 1903                            History of Present Illness       Chief Complaint   Patient presents with    Weakness - Generalized     Pt. Arrives VIA EMS for new weakness that started at 3:30. Pt reports having parkinson's but has increasingly gotten shaker since 3:30pm as well as a new cough. Pt. Has a urinary catheter had appt. A couple days ago and no infection reported.        Past Medical History:   Diagnosis Date    AA (aortic aneurysm) ([facility])     Coronary artery disease     CVA (cerebral vascular accident) ([facility])     Parkinsons ([facility])     Pulmonary emboli ([facility])       Past Surgical History:   Procedure Laterality Date    CATARACT EXTRACTION      CORONARY ANGIOPLASTY WITH STENT PLACEMENT        No family history on file.   Social History     Tobacco Use    Smoking status: Never    Smokeless tobacco: Never   Vaping Use    Vaping status: Never Used   Substance Use Topics    Alcohol use: Never    Drug use: Never      E-Cigarette/Vaping    E-Cigarette Use Never User       E-Cigarette/Vaping Substances    Nicotine No     THC No     CBD No     Flavoring No     Other No     Unknown No       I have reviewed and agree with the history as documented.     89-year-old male presents with generalized weakness relatively acute onset that started 3 PM today he says increased in his tremors he has a history of Parkinson's no change in his medications recently Sparks catheter was placed for urinary retention without any evidence of infection he denies any chest pain shortness of breath nausea vomiting or pain diarrhea expressive aphasia alert speech focal weakness numbness tingling headache head trauma or any other symptoms.  Lives at home with his wife.      History provided by:  Patient    used: No        Review of Systems   Constitutional: Negative.    HENT: Negative.     Eyes: Negative.    Respiratory: Negative.     Cardiovascular: Negative.    Gastrointestinal: Negative.    Endocrine: Negative.    Genitourinary: Negative.    Musculoskeletal: Negative.    Skin: Negative.    Allergic/Immunologic: Negative.    Neurological:  Positive for tremors and weakness.   Hematological: Negative.    Psychiatric/Behavioral: Negative.     All other systems reviewed and are negative.          Objective       ED Triage Vitals [01/10/25 1901]   Temperature Pulse Blood Pressure Respirations SpO2 Patient Position - Orthostatic VS   97.8 °F (36.6 °C) 85 168/75 18 94 % Lying      Temp Source Heart Rate Source BP Location FiO2 (%) Pain Score    Oral Monitor Left arm -- 10 - Worst Possible Pain      Vitals      Date and Time Temp Pulse SpO2 Resp BP Pain Score FACES Pain Rating User   01/10/25 2200 -- 80 95 % 18 158/71 -- -- DD   01/10/25 2130 -- 80 94 % 18 158/67 -- -- DD   01/10/25 2100 -- 80 93 % 18 148/65 -- -- DD   01/10/25 1901 97.8 °F (36.6 °C) 85 94 % 18 168/75 10 - Worst Possible Pain -- DD            Physical Exam  Vitals and nursing note reviewed.   Constitutional:       Appearance: Normal appearance.   HENT:      Head: Normocephalic and atraumatic.      Nose: Nose normal.      Mouth/Throat:      Mouth: Mucous membranes are moist.   Eyes:      Extraocular Movements: Extraocular movements intact.      Pupils: Pupils are equal, round, and reactive to light.   Cardiovascular:      Rate and Rhythm: Normal rate and regular rhythm.   Pulmonary:      Effort: Pulmonary effort is normal.      Breath sounds: Normal breath sounds.   Abdominal:      General: Abdomen is flat. Bowel sounds are normal.      Palpations: Abdomen is soft.   Musculoskeletal:         General: Normal range of motion.      Cervical back: Normal range of motion and neck supple.   Skin:     General: Skin is warm.      Capillary Refill: Capillary refill  takes less than 2 seconds.   Neurological:      General: No focal deficit present.      Mental Status: He is alert and oriented to person, place, and time. Mental status is at baseline.      Cranial Nerves: No cranial nerve deficit.      Sensory: No sensory deficit.      Motor: Weakness present.      Coordination: Coordination normal.      Gait: Gait normal.      Deep Tendon Reflexes: Reflexes normal.      Comments: Decreased strength body overall including his lower extremities and upper extremities.  No focal deficits noted.   Psychiatric:         Mood and Affect: Mood normal.         Thought Content: Thought content normal.         Results Reviewed       Procedure Component Value Units Date/Time    Lactic acid, plasma (w/reflex if result > 2.0) [[id number]] Collected: 01/10/25 2210    Lab Status: In process Specimen: Blood from Arm, Right Updated: 01/10/25 2212    HS Troponin I 2hr [[id number]]  (Normal) Collected: 01/10/25 2109    Lab Status: Final result Specimen: Blood from Arm, Left Updated: 01/10/25 2137     hs TnI 2hr 5 ng/L      Delta 2hr hsTnI 1 ng/L     Blood culture #2 [[id number]] Collected: 01/10/25 2025    Lab Status: In process Specimen: Blood from Arm, Right Updated: 01/10/25 2031    Blood culture #1 [[id number]] Collected: 01/10/25 2025    Lab Status: In process Specimen: Blood from Arm, Left Updated: 01/10/25 2030    CBC and differential [[id number]]  (Abnormal) Collected: 01/10/25 1933    Lab Status: Final result Specimen: Blood from Arm, Left Updated: 01/10/25 2020     WBC 17.72 Thousand/uL      RBC 4.06 Million/uL      Hemoglobin 12.0 g/dL      Hematocrit 36.7 %      MCV 90 fL      MCH 29.6 pg      MCHC 32.7 g/dL      RDW 12.7 %      MPV 11.1 fL      Platelets 167 Thousands/uL      nRBC 0 /100 WBCs      Segmented % 92 %      Immature Grans % 1 %      Lymphocytes % 2 %      Monocytes % 4 %      Eosinophils Relative 1 %      Basophils Relative 0 %      Absolute Neutrophils 16.48 Thousands/µL       Absolute Immature Grans 0.10 Thousand/uL      Absolute Lymphocytes 0.30 Thousands/µL      Absolute Monocytes 0.72 Thousand/µL      Eosinophils Absolute 0.09 Thousand/µL      Basophils Absolute 0.03 Thousands/µL     Narrative:      This is an appended report.  These results have been appended to a previously verified report.    TSH, 3rd generation with Free T4 reflex [[id number]]  (Normal) Collected: 01/10/25 1933    Lab Status: Final result Specimen: Blood from Arm, Left Updated: 01/10/25 2020     TSH 3RD GENERATON 3.541 uIU/mL     Smear Review(Phlebs Do Not Order) [[id number]] Collected: 01/10/25 1933    Lab Status: Final result Specimen: Blood from Arm, Left Updated: 01/10/25 2020     RBC Morphology Normal     Platelet Estimate Adequate     Large Platelet Present    HS Troponin I 4hr [[id number]]     Lab Status: No result Specimen: Blood     HS Troponin 0hr (reflex protocol) [[id number]]  (Normal) Collected: 01/10/25 1933    Lab Status: Final result Specimen: Blood from Arm, Left Updated: 01/10/25 2011     hs TnI 0hr 4 ng/L     Urine Microscopic [[id number]]  (Abnormal) Collected: 01/10/25 1944    Lab Status: Final result Specimen: Urine, Indwelling Sparks Catheter Updated: 01/10/25 2010     RBC, UA Innumerable /hpf      WBC, UA Innumerable /hpf      Epithelial Cells None Seen /hpf      Bacteria, UA Moderate /hpf      Amorphous Crystals, UA Occasional    Comprehensive metabolic panel [[id number]]  (Abnormal) Collected: 01/10/25 1933    Lab Status: Final result Specimen: Blood from Arm, Left Updated: 01/10/25 2006     Sodium 137 mmol/L      Potassium 4.5 mmol/L      Chloride 108 mmol/L      CO2 26 mmol/L      ANION GAP 3 mmol/L      BUN 25 mg/dL      Creatinine 0.99 mg/dL      Glucose 141 mg/dL      Calcium 8.4 mg/dL      AST 10 U/L      ALT 3 U/L      Alkaline Phosphatase 41 U/L      Total Protein 5.8 g/dL      Albumin 3.6 g/dL      Total Bilirubin 0.81 mg/dL      eGFR 67 ml/min/1.73sq m     Narrative:      National  Kidney Disease Foundation guidelines for Chronic Kidney Disease (CKD):     Stage 1 with normal or high GFR (GFR > 90 mL/min/1.73 square meters)    Stage 2 Mild CKD (GFR = 60-89 mL/min/1.73 square meters)    Stage 3A Moderate CKD (GFR = 45-59 mL/min/1.73 square meters)    Stage 3B Moderate CKD (GFR = 30-44 mL/min/1.73 square meters)    Stage 4 Severe CKD (GFR = 15-29 mL/min/1.73 square meters)    Stage 5 End Stage CKD (GFR <15 mL/min/1.73 square meters)  Note: GFR calculation is accurate only with a steady state creatinine    Magnesium [[id number]]  (Normal) Collected: 01/10/25 1933    Lab Status: Final result Specimen: Blood from Arm, Left Updated: 01/10/25 2006     Magnesium 1.9 mg/dL     FLU/COVID Rapid Antigen (30 min. TAT) - Preferred screening test in ED [[id number]]  (Normal) Collected: 01/10/25 1933    Lab Status: Final result Specimen: Nares from Nose Updated: 01/10/25 2005     SARS COV Rapid Antigen Negative     Influenza A Rapid Antigen Negative     Influenza B Rapid Antigen Negative    Narrative:      This test has been performed using the Quidel Willa 2 FLU+SARS Antigen test under the Emergency Use Authorization (EUA). This test has been validated by the  and verified by the performing laboratory. The Willa uses lateral flow immunofluorescent sandwich assay to detect SARS-COV, Influenza A and Influenza B Antigen.     The Quidel Willa 2 SARS Antigen test does not differentiate between SARS-CoV and SARS-CoV-2.     Negative results are presumptive and may be confirmed with a molecular assay, if necessary, for patient management. Negative results do not rule out SARS-CoV-2 or influenza infection and should not be used as the sole basis for treatment or patient management decisions. A negative test result may occur if the level of antigen in a sample is below the limit of detection of this test.     Positive results are indicative of the presence of viral antigens, but do not rule out bacterial  infection or co-infection with other viruses.     All test results should be used as an adjunct to clinical observations and other information available to the provider.    FOR PEDIATRIC PATIENTS - copy/paste COVID Guidelines URL to browser: [email or web address]    UA (URINE) with reflex to Scope [[id number]]  (Abnormal) Collected: 01/10/25 1944    Lab Status: Final result Specimen: Urine, Indwelling Sparks Catheter Updated: 01/10/25 1952     Color, UA Yellow     Clarity, UA Clear     Specific Gravity, UA 1.025     pH, UA 6.5     Leukocytes, UA Large     Nitrite, UA Positive     Protein,  (2+) mg/dl      Glucose, UA Negative mg/dl      Ketones, UA Negative mg/dl      Urobilinogen, UA 2.0 mg/dl      Bilirubin, UA Negative     Occult Blood, UA Large    Fingerstick Glucose (POCT) [[id number]]  (Normal) Collected: 01/10/25 1909    Lab Status: Final result Specimen: Blood Updated: 01/10/25 1910     POC Glucose 131 mg/dl             No orders to display       ECG 12 Lead Documentation Only    Date/Time: 1/10/2025 7:43 PM    Performed by: Arlin Ontiveros DO  Authorized by: Arlin Ontiveros DO    ECG reviewed by me, the ED Provider: yes    Patient location:  ED  Previous ECG:     Previous ECG:  Unavailable    Comparison to cardiac monitor: Yes    Interpretation:     Interpretation: non-specific    Rate:     ECG rate assessment: normal    Rhythm:     Rhythm: sinus rhythm    Ectopy:     Ectopy: none    QRS:     QRS axis:  Normal  Conduction:     Conduction: normal    ST segments:     ST segments:  Non-specific  T waves:     T waves: non-specific        ED Medication and Procedure Management   Prior to Admission Medications   Prescriptions Last Dose Informant Patient Reported? Taking?   Cholecalciferol 50 MCG (2000 UT) TABS  Spouse/Significant Other Yes No   Sig: Take 50 mcg by mouth daily   apixaban (Eliquis) 5 mg  Spouse/Significant Other Yes No   Sig: Take 5 mg by mouth 2  (two) times a day   Patient not taking: Reported on 1/7/2025   atorvastatin (LIPITOR) 20 mg tablet  Spouse/Significant Other No No   Sig: Take 1 tablet (20 mg total) by mouth daily   Patient not taking: Reported on 1/7/2025   carbidopa-levodopa (SINEMET)  mg per tablet  Spouse/Significant Other Yes No   Sig: Take 1 tablet by mouth 3 (three) times a day   cephalexin (KEFLEX) 500 mg capsule   No No   Sig: Take 1 capsule (500 mg total) by mouth every 12 (twelve) hours for 5 days   cyanocobalamin (VITAMIN B-12) 1000 MCG tablet  Spouse/Significant Other No No   Sig: Take 1 tablet (1,000 mcg total) by mouth daily   Patient not taking: Reported on 11/1/2024   guaiFENesin (ROBITUSSIN) 200 MG/10ML oral liquid  Spouse/Significant Other No No   Sig: Take 10 mL (200 mg total) by mouth every 4 (four) hours as needed (cough)   Patient not taking: Reported on 11/1/2024   levothyroxine 100 mcg tablet  Spouse/Significant Other No No   Sig: Take 1 tablet (100 mcg total) by mouth daily in the early morning   lisinopril (ZESTRIL) 2.5 mg tablet  Spouse/Significant Other No No   Sig: Take 1 tablet (2.5 mg total) by mouth daily   polyethylene glycol (MIRALAX) 17 g packet  Spouse/Significant Other No No   Sig: Take 17 g by mouth daily as needed (constipation)   ticagrelor (Brilinta) 60 MG   Yes No   Sig: Take 60 mg by mouth every 12 (twelve) hours      Facility-Administered Medications: None     Patient's Medications   Discharge Prescriptions    No medications on file     No discharge procedures on file.  ED SEPSIS DOCUMENTATION   Time reflects when diagnosis was documented in both MDM as applicable and the Disposition within this note       Time User Action Codes Description Comment    1/10/2025  7:21 PM Arlin Ontiveros [R53.1] Weakness     1/10/2025  7:22 PM Arlin Ontiveros [R25.1] Pill rolling tremors     1/10/2025  9:03 PM Arlin Ontiveros [N39.0] UTI (urinary tract infection)                  Arlin Ontiveros DO  01/10/25  0845

## 2025-01-11 NOTE — ASSESSMENT & PLAN NOTE
Per patient and significant other at bedside he developed gross hematuria after inserting Sparks's catheter on 12/24/2024 he was advised to stop Eliquis and Brilinta given hematuria by emergency medicine provider.  Family reports gross hematuria resolved several days ago.  Brilinta was restarted yesterday per family.    -Currently clear urine noted at bedside in Sparks's catheter bag.  -Restart Eliquis  -Continue Brilinta  -Continue to monitor    -Hematuria resolved

## 2025-01-11 NOTE — CONSULTS
H&P Exam - Urology       Patient: Pavan Edward   : 1935 Sex: male   MRN: [id number]     CSN: [id number]      History of Present Illness   HPI:  Pavan Edward is a 89 y.o. male worsening voiding complaints over the last few years history of A-fib hypertension Parkinson disease CVA presented to [facility] ER on  with generalized weakness having Sparks catheter inserted left to bag drainage patient seen by [facility] urology on  attempting voiding trial and failing having catheter replaced presenting to ER last night with generalized weakness hematuria found on urine analysis to have complicated UTI/red blood cells on Eliquis CVA and admitted.  Chronic constipation noted        Review of Systems:   Constitutional:  Negative for activity change, fever, chills and diaphoresis.   HENT: Negative for hearing loss and trouble swallowing.   Eyes: Negative for itching and visual disturbance.   Respiratory: Negative for chest tightness and shortness of breath.   Cardiovascular: Negative for chest pain, edema.   Gastrointestinal: Negative for abdominal distention, na abdominal pain, constipation, diarrhea, Nausea and vomiting.   Genitourinary: Negative for decreased urine volume, difficulty urinating, dysuria, enuresis, frequency, hematuria and urgency.   Musculoskeletal: Negative for gait problem and myalgias.   Neurological: Negative for dizziness and headaches.   Hematological: Does not bruise/bleed easily.       Historical Information   Past Medical History:   Diagnosis Date    AA (aortic aneurysm) ([facility])     Coronary artery disease     CVA (cerebral vascular accident) ([facility])     Parkinsons ([facility])     Pulmonary emboli ([facility])      Past Surgical History:   Procedure Laterality Date    CATARACT EXTRACTION      CORONARY ANGIOPLASTY WITH STENT PLACEMENT       Social History   Social History     Substance and Sexual Activity   Alcohol Use Never     Social History     Substance and Sexual  Activity   Drug Use Never     Social History     Tobacco Use   Smoking Status Never   Smokeless Tobacco Never     Family History: No family history on file.    Meds/Allergies     Medications Prior to Admission:     carbidopa-levodopa (SINEMET)  mg per tablet    cephalexin (KEFLEX) 500 mg capsule    Cholecalciferol 50 MCG (2000 UT) TABS    levothyroxine 100 mcg tablet    lisinopril (ZESTRIL) 2.5 mg tablet    ticagrelor (Brilinta) 60 MG    apixaban (Eliquis) 5 mg    atorvastatin (LIPITOR) 20 mg tablet    cyanocobalamin (VITAMIN B-12) 1000 MCG tablet    guaiFENesin (ROBITUSSIN) 200 MG/10ML oral liquid    polyethylene glycol (MIRALAX) 17 g packet  No Known Allergies    Objective   Vitals: /58   Pulse 63   Temp 98.6 °F (37 °C) (Temporal)   Resp 19   SpO2 97%     Physical Exam:  General Alert awake   Normocephalic atraumatic PERRLA  Lungs clear bilaterally  Cardiac normal S1 normal S2  Abdomen soft, flank pain  Sparks draining clear urine  Extremities no edema    I/O last 24 hours:  In: -   Out: 650 [Urine:650]    Invasive Devices       Peripheral Intravenous Line  Duration             Peripheral IV 01/10/25 Left;Ventral (anterior) Hand <1 day              Drain  Duration             Urethral Catheter Latex 16 Fr. 17 days                        Lab Results: CBC:   Lab Results   Component Value Date    WBC 17.62 (H) 01/11/2025    HGB 11.4 (L) 01/11/2025    HCT 35.6 (L) 01/11/2025    MCV 91 01/11/2025     01/11/2025    RBC 3.93 01/11/2025    MCH 29.0 01/11/2025    MCHC 32.0 01/11/2025    RDW 12.7 01/11/2025    MPV 11.5 01/11/2025    NRBC 0 01/11/2025     CMP:   Lab Results   Component Value Date     01/11/2025    CO2 26 01/11/2025    BUN 26 (H) 01/11/2025    CREATININE 1.07 01/11/2025    CALCIUM 7.5 (L) 01/11/2025    AST 8 (L) 01/11/2025    ALT 3 (L) 01/11/2025    ALKPHOS 39 01/11/2025    EGFR 61 01/11/2025     Urinalysis:   Lab Results   Component Value Date    COLORU Yellow 01/10/2025     "CLARITYU Clear 01/10/2025    SPECGRAV 1.025 01/10/2025    PHUR 6.5 01/10/2025    LEUKOCYTESUR Large (A) 01/10/2025    NITRITE Positive (A) 01/10/2025    GLUCOSEU Negative 01/10/2025    KETONESU Negative 01/10/2025    BILIRUBINUR Negative 01/10/2025    BLOODU Large (A) 01/10/2025     Urine Culture: No results found for: \"URINECX\"  PSA: No results found for: \"PSA\"        Assessment/ Plan:  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

## 2025-01-11 NOTE — ASSESSMENT & PLAN NOTE
POA with generalized weakness and worsening parkinson symptoms the last 3 days  Follows with urology, benign prostate hyperplasia with urinary retention , Balderrama catheter in place in Ed on 12/25/24  Chart review performed: Seen in urology's office 1/7/2025: Urinary retention  for voiding trial failed balderrama replaced  In Ed noted, Leukocytosis WBCs 17.72, lactic acid and Pro-Zi negative, afebrile  UA positive for nitrates, blood, micro positive for innumerable RBC, WBC, and moderate bacteria   Denies any abdominal pain, pelvic pain no hematuria noted  In Ed started on Ceftriaxone 1000 mg IV q 24 hrs, continue antbx  Suspect etiology secondary to UTI  Follow-up on blood cultures x 2  Follow-up on urine cultures adjust antibiotics accordingly  Maintain balderrama catheter   Urology consulted appreciate recs  PT/OT Consulted

## 2025-01-11 NOTE — ASSESSMENT & PLAN NOTE
POA with bilateral leg pain behind his knees and swelling  Per wife patient Eliquis and ticagrelor have been on hold since he developed hematuria   Obtain venous study r/oDVT  PRN Tylenol for pain  Elevated lower extremities

## 2025-01-11 NOTE — ASSESSMENT & PLAN NOTE
Follow with cardiology  Denies chest pain or palpations   S/p PCI x2 on 8/13/2023  Home medication Brilinta was on hold in the setting of hematuria, patient wife restarted because he has not been on it for some time  Chart review performed:  ED 12/30/24 for intermittent hematuria ongoing in the emergency department likely related to traumatic Sparks in the setting of anticoagulation  Urology consulted appreciate recommendations to restart Brillanta and Elqiuis

## 2025-01-11 NOTE — PLAN OF CARE

## 2025-01-11 NOTE — UTILIZATION REVIEW
Initial Clinical Review    Admission: Date/Time/Statement:   Admission Orders (From admission, onward)       Ordered        01/10/25 2103  INPATIENT ADMISSION  Once                     Orders Placed This Encounter   Procedures    INPATIENT ADMISSION     Standing Status:   Standing     Number of Occurrences:   1     Level of Care:   Med Surg [16]     Estimated length of stay:   More than 2 Midnights     Certification:   I certify that inpatient services are medically necessary for this patient for a duration of greater than two midnights. See H&P and MD Progress Notes for additional information about the patient's course of treatment.     ED Arrival Information       Expected   -    Arrival   1/10/2025 18:52    Acuity   Urgent              Means of arrival   Ambulance    [facility]    Admission type   Emergency              Arrival complaint   Hypertension        Chief Complaint   Patient presents with    Weakness - Generalized     Pt. Arrives VIA EMS for new weakness that started at 3:30. Pt reports having parkinson's but has increasingly gotten shaker since 3:30pm as well as a new cough. Pt. Has a urinary catheter had appt. A couple days ago and no infection reported.      Initial Presentation:   89 y/o male with PMHx HTN, CVA, CAD s/p PCI 2023, Atrial Fib on Eliquis, aortic aneurysm, PE, Parkinson's, recently diagnosed with urinary retention s/p Sparks catheter insertion on 1/7/25 - presented to [facility] ED on 1/10/25 2nd 3 day history of generalized fatigue and suprapubic pressure.   Reports developed gross hematuria 2nd trauma from initial Sparks catheter insertion on 12/24/24 and both Brilinta and Eliquis were held per ED provider at that time.   Exam:  generalized weakness    Labs: WBCs 17,720 lactic acid and Pro-Zi negative. UA positive for nitrates, blood, micro positive for innumerable RBC, WBC, and moderate bacteria.   Urine culture >100,000 cfu/ml Chryseobacterium  indologenes*   ED Tx: IVF NSS, IV Rocephin, IV ativan  Admitted Inpatient 1/10/25 at 2103 -     1#complicated UTI  2#urinary retention  3#A-fib  4#CAD s/p PCI  5#hypertension  6#chronic constipation     Plan:  Started on ceftriaxone will continue  Follow-up with urine cultures  Sparks's catheter in place; draining clear urine.  will resume Eliquis and Brilinta and monitor I/os.  Consult urology team; appreciate input  Patient and family reports chronic constipation; bowel regimen ordered.  Check daily labs.    MD Anticipated Length of Stay: Patient will be admitted on an inpatient basis with an anticipated length of stay of greater than 2 midnights secondary to complicated UTI requiring Iv antbiotics.        1/11/25 DAY 2:  generalized fatigue/weakness.    Sparks draining - urine clear.  Family reports restarting Brilinta yesterday.   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  MD Certification Statement: The patient will continue to require additional inpatient hospital stay due to complicated UTI with gram-negative bacteremia, acute metabolic encephalopathy     Gross hematuria  Brilinta was restarted yesterday per family.   -Currently yellow colored urine. No hematuria . Continue to monitor.    -Restart Eliquis  -Continue Brilinta  -Continue to monitor     Gram-negative  bacteremia  Pseudomonas aeruginosa + blood culture   -mostly of urine origin given current acute cystitis    -started on IV cefepime 2 Gms Q8H   -consult infectious disease team      ED Treatment-Medication Administration from 01/10/2025 1852 to 01/10/2025 2357         Date/Time Order Dose Route Action     01/10/2025 1935 LORazepam (ATIVAN) injection 0.25 mg 0.25 mg Intravenous Given     01/10/2025 2026 cefTRIAXone (ROCEPHIN) IVPB (premix in dextrose) 1,000 mg 50 mL 1,000 mg Intravenous New Bag     01/10/2025 2310 carbidopa-levodopa (SINEMET)  mg per tablet 1 tablet 1 tablet Oral Given     01/10/2025 2245 ticagrelor tablet 60 mg -- Oral Held Dose     01/10/2025 2312 sodium chloride 0.9 % infusion 75 mL/hr Intravenous New Bag     01/10/2025 2310 gabapentin (NEURONTIN) capsule 100 mg 100 mg Oral Given     Diet Cardiovascular; Cardiac    Scheduled Medications:  [START ON 1/13/2025] amLODIPine, 5 mg, Oral, Daily  apixaban, 5 mg, Oral, BID  carbidopa-levodopa, 1 tablet, Oral, TID  cefepime, 2,000 mg, Intravenous, Q12H  Cholecalciferol, 2,000 Units, Oral, Daily  docusate sodium, 100 mg, Oral, BID  gabapentin, 100 mg, Oral, HS  levothyroxine, 100 mcg, Oral, Early Morning  [START ON 1/13/2025] lisinopril, 5 mg, Oral, Daily  polyethylene glycol, 17 g, Oral, HS  senna, 1 tablet, Oral, Daily  tamsulosin, 0.4 mg, Oral, Daily With Dinner  ticagrelor, 60 mg, Oral, Q12H SAM    Continuous IV Infusions:  sodium chloride, 75 mL/hr, Intravenous, Continuous    PRN Meds:  acetaminophen, 650 mg, Oral, Q6H PRN  ondansetron, 4 mg, Intravenous, Q6H PRN  polyethylene glycol, 17 g, Oral, Daily PRN - 1/12 X 1      ED Triage Vitals [01/10/25 1901]   Temperature Pulse Respirations Blood Pressure SpO2 Pain Score   97.8 °F (36.6 °C) 85 18 168/75 94 % 10 - Worst Possible Pain     Weight (last 2 days)       Date/Time Weight    01/11/25 2005 81.6 (180)     Vital Signs (last 3 days)       Date/Time Temp Pulse Resp BP MAP (mmHg) SpO2 O2 Device  Patient Position - Orthostatic VS Rakesh Coma Scale Score Pain    01/12/25 1939 97.9 °F (36.6 °C) 69 18 141/71 100 96 % None (Room air) Lying -- No Pain    01/12/25 1527 98.4 °F (36.9 °C) 70 16 182/79 113 95 % None (Room air) Lying -- --    01/12/25 0738 97.7 °F (36.5 °C) 73 -- 155/67 -- -- -- -- -- No Pain    01/11/25 2300 97.7 °F (36.5 °C) 72 18 157/66 95 95 % None (Room air) Lying -- --    01/11/25 2005 98.6 °F (37 °C) 68 20 148/65 94 95 % None (Room air) Lying -- No Pain    01/11/25 1623 100 °F (37.8 °C) 64 18 176/85 108 97 % None (Room air) Lying -- --    01/11/25 0800 98.6 °F (37 °C) 63 19 149/58 -- 97 % -- -- -- No Pain    01/11/25 0030 -- -- -- -- -- -- -- -- -- 10 - Worst Possible Pain    01/10/25 2357 99.1 °F (37.3 °C) 79 18 121/57 82 94 % None (Room air) Lying -- --    01/10/25 2300 -- 80 18 138/63 90 95 % None (Room air) Lying -- --    01/10/25 2230 -- 79 18 149/68 98 95 % None (Room air) Lying -- --    01/10/25 2200 -- 80 18 158/71 102 95 % None (Room air) Lying -- --    01/10/25 2151 -- -- -- -- -- -- -- -- 15 --    01/10/25 2130 -- 80 18 158/67 97 94 % None (Room air) Lying -- --    01/10/25 2100 -- 80 18 148/65 94 93 % None (Room air) Lying -- --    01/10/25 1901 97.8 °F (36.6 °C) 85 18 168/75 108 94 % None (Room air) Lying -- 10 - Worst Possible Pain     I/O 01/11  0701 01/12 0701    Output 1,400 1,100    Urine   (mL/kg/hr) 1,400   (0.7) 1,100    Net -1,400 -1,100        Pertinent Labs/Diagnostic Test Results:     Results from last 7 days   Lab Units 01/12/25  0714 01/11/25  0613 01/10/25  1933   WBC Thousand/uL 13.14* 17.62* 17.72*   HEMOGLOBIN g/dL 11.9* 11.4* 12.0   HEMATOCRIT % 36.3* 35.6* 36.7   PLATELETS Thousands/uL 151 171 167   TOTAL NEUT ABS Thousands/µL 11.63* 15.62* 16.48*     Results from last 7 days   Lab Units 01/12/25  0714 01/11/25  0613 01/10/25  1933   SODIUM mmol/L 139 139 137   POTASSIUM mmol/L 4.4 4.6 4.5   CHLORIDE mmol/L 108 108 108   CO2 mmol/L 25 26 26   ANION GAP mmol/L  6 5 3*   BUN mg/dL 19 26* 25   CREATININE mg/dL 0.84 1.07 0.99   EGFR ml/min/1.73sq m 77 61 67   CALCIUM mg/dL 7.6* 7.5* 8.4   MAGNESIUM mg/dL 1.9  --  1.9     Results from last 7 days   Lab Units 01/12/25  0714 01/11/25  0613 01/10/25  1933   AST U/L 8* 8* 10*   ALT U/L 4* 3* 3*   ALK PHOS U/L 40 39 41   TOTAL PROTEIN g/dL 5.4* 5.3* 5.8*   ALBUMIN g/dL 2.9* 3.0* 3.6   TOTAL BILIRUBIN mg/dL 0.74 1.09* 0.81     Results from last 7 days   Lab Units 01/10/25  1909   POC GLUCOSE mg/dl 131     Results from last 7 days   Lab Units 01/12/25  0714 01/11/25  0613 01/10/25  1933   GLUCOSE RANDOM mg/dL 94 112 141*     Results from last 7 days   Lab Units 01/10/25  2109 01/10/25  1933   HS TNI 0HR ng/L  --  4   HS TNI 2HR ng/L 5  --    HSTNI D2 ng/L 1  --      Results from last 7 days   Lab Units 01/10/25  1933   TSH 3RD GENERATON uIU/mL 3.541       Results from last 7 days   Lab Units 01/10/25  2210   LACTIC ACID mmol/L 0.6     Results from last 7 days   Lab Units 01/10/25  1944   CLARITY UA  Clear   COLOR UA  Yellow   SPEC GRAV UA  1.025   PH UA  6.5   GLUCOSE UA mg/dl Negative   KETONES UA mg/dl Negative   BLOOD UA  Large*   PROTEIN UA mg/dl 100 (2+)*   NITRITE UA  Positive*   BILIRUBIN UA  Negative   UROBILINOGEN UA (BE) mg/dl 2.0*   LEUKOCYTES UA  Large*   WBC UA /hpf Innumerable*   RBC UA /hpf Innumerable*   BACTERIA UA /hpf Moderate*   EPITHELIAL CELLS WET PREP /hpf None Seen     Results from last 7 days   Lab Units 01/10/25  2025 01/10/25  1944   BLOOD CULTURE  No Growth at 24 hrs.  --    GRAM STAIN RESULT  Gram negative rods*  --    URINE CULTURE   --  >100,000 cfu/ml Chryseobacterium indologenes*       Past Medical History:   Diagnosis Date    AA (aortic aneurysm) (HCC)     Coronary artery disease     CVA (cerebral vascular accident) (HCC)     Parkinsons (HCC)     Pulmonary emboli (HCC)      Present on Admission:   CAD (coronary artery disease)   Hypertension   Complicated UTI (urinary tract infection)   Paroxysmal  atrial fibrillation (HCC)   Parkinson's disease (HCC)   Pain in both lower extremities   PAD (peripheral artery disease) (HCC)    Admitting Diagnosis: Urinary retention [R33.9]  UTI (urinary tract infection) [N39.0]  Weakness generalized [R53.1]  Weakness [R53.1]  Pill rolling tremors [R25.1]    Age/Sex: 89 y.o. male    Network Utilization Review Department  ATTENTION: Please call with any questions or concerns to [phone] and carefully listen to the prompts so that you are directed to the right person. All voicemails are confidential.   For Discharge needs, contact Care Management DC Support Team at [phone] opt. 2  Send all requests for admission clinical reviews, approved or denied determinations and any other requests to dedicated fax number below belonging to the campus where the patient is receiving treatment. List of dedicated fax numbers for the Facilities:  FACILITY NAME UR FAX NUMBER   ADMISSION DENIALS (Administrative/Medical Necessity) [phone]   DISCHARGE SUPPORT TEAM (NETWORK) [phone]   PARENT CHILD HEALTH (Maternity/NICU/Pediatrics) [phone]   [facility] [phone]   [facility] [phone]   Replaced by [facility] [phone]   [facility] [phone]   [facility] [phone]   [facility] [phone]   [facility] [phone]   [facility] [phone]   [facility] [phone]   [facility] [phone]   [facility] [phone]   [facility] [phone]

## 2025-01-11 NOTE — PROGRESS NOTES
Progress Note - Hospitalist   Name: Pavan Edward 89 y.o. male I MRN: [id number]  Unit/Bed#: 3 [address] I Date of Admission: 1/10/2025   Date of Service: 1/11/2025 I Hospital Day: 1    Assessment & Plan  Complicated UTI (urinary tract infection)  POA with generalized weakness and worsening parkinson symptoms the last 3 days  Follows with urology, benign prostate hyperplasia with urinary retention , Balderrama catheter in place in Ed on 12/25/24  Chart review performed: Seen in urology's office 1/7/2025: Urinary retention  for voiding trial failed balderrama replaced  In Ed noted, Leukocytosis WBCs 17.72, lactic acid and Pro-Zi negative, afebrile  UA positive for nitrates, blood, micro positive for innumerable RBC, WBC, and moderate bacteria     Denies any abdominal pain, pelvic pain no hematuria noted  Continue ceftriaxone  Follow-up with urine culture  Balderrama's catheter remains in place.  Consult urology  Interval follow-up  Trend fevers, CBC, vitals  CAD (coronary artery disease)  Follow with cardiology  Denies chest pain or palpations   S/p PCI x2 on 8/13/2023  Brilinta was on hold on 12/24 per ED given gross hematuria.  However it was traumatic secondary to Balderrama's catheter insertion.  Currently hematuria cleared per bedside exam and patient.  Restart Brilinta     Hypertension  Home medication lisinopril continued   Paroxysmal atrial fibrillation (HCC)  Continue PTA medications    -Eliquis was on hold since 12/24 secondary to gross hematuria after inserting Balderrama's catheter.  -Hematuria resolved.  Restart Eliquis  Parkinson's disease ([facility])  Chronic   Home medication carbidopa-levodopa continued  PT consulted   Pain in both lower extremities  POA with bilateral leg pain behind his knees and swelling  Per wife patient Eliquis and ticagrelor have been on hold since he developed hematuria   Obtain venous study r/oDVT  PRN Tylenol for pain  Elevated lower extremities   PAD (peripheral artery disease) ([facility])  Followed by  vascular surgery  Home medicaiton  Brilinta 60 mg every 12 hours      Gross hematuria  Per patient and significant other at bedside he developed gross hematuria after inserting Sparks's catheter on 12/24/2024 he was advised to stop Eliquis and Brilinta given hematuria by emergency medicine provider.  Family reports gross hematuria resolved several days ago.  Brilinta was restarted yesterday per family.    -Currently clear urine noted at bedside in Sparks's catheter bag.  -Restart Eliquis  -Continue Brilinta  -Continue to monitor    -Hematuria resolved    VTE Pharmacologic Prophylaxis:   Moderate Risk (Score 3-4) - Pharmacological DVT Prophylaxis Ordered: apixaban (Eliquis).    Mobility:   Basic Mobility Inpatient Raw Score: 11  JH-HLM Goal: 4: Move to chair/commode  JH-HLM Achieved: 4: Move to chair/commode  JH-HLM Goal achieved. Continue to encourage appropriate mobility.    Patient Centered Rounds: I performed bedside rounds with nursing staff today.   Discussions with Specialists or Other Care Team Provider: urology     Education and Discussions with Family / Patient: Updated  (significant other) at bedside.    Current Length of Stay: 1 day(s)  Current Patient Status: Inpatient   Certification Statement: The patient will continue to require additional inpatient hospital stay due to complicated urinary tract infection  Discharge Plan: Anticipate discharge in 24-48 hrs to home.    Code Status: Level 3 - DNAR and DNI    Subjective   Patient seen today at bedside.  He reports feeling better.  Denies any nausea or vomiting, diarrhea or constipation.  On presentation reports he was complaining of generalized fatigue/weakness.    Per patient and significant other at bedside he developed gross hematuria after inserting Sparks's catheter on 12/24/2024 he was advised to stop Eliquis and Brilinta given hematuria.  Family reports restarting Brilinta yesterday.    Objective :  Temp:  [97.8 °F (36.6 °C)-99.1 °F  (37.3 °C)] 98.6 °F (37 °C)  HR:  [63-85] 63  BP: (121-168)/(57-75) 149/58  Resp:  [18-19] 19  SpO2:  [93 %-97 %] 97 %  O2 Device: None (Room air)    There is no height or weight on file to calculate BMI.     Input and Output Summary (last 24 hours):     Intake/Output Summary (Last 24 hours) at 1/11/2025 1135  Last data filed at 1/11/2025 0030  Gross per 24 hour   Intake --   Output 650 ml   Net -650 ml       Physical Exam  Vitals and nursing note reviewed.   Constitutional:       General: He is not in acute distress.     Appearance: Normal appearance.   HENT:      Head: Normocephalic and atraumatic.      Mouth/Throat:      Mouth: Mucous membranes are moist.   Eyes:      Conjunctiva/sclera: Conjunctivae normal.      Pupils: Pupils are equal, round, and reactive to light.   Cardiovascular:      Rate and Rhythm: Normal rate and regular rhythm.      Pulses: Normal pulses.           Carotid pulses are 2+ on the right side and 2+ on the left side.       Radial pulses are 2+ on the right side and 2+ on the left side.        Dorsalis pedis pulses are 2+ on the right side and 2+ on the left side.      Heart sounds: Normal heart sounds, S1 normal and S2 normal. No murmur heard.  Pulmonary:      Effort: No tachypnea, bradypnea or accessory muscle usage.      Breath sounds: Normal breath sounds and air entry. No decreased breath sounds, wheezing, rhonchi or rales.   Abdominal:      General: Abdomen is flat. Bowel sounds are normal. There is no distension.      Palpations: Abdomen is soft.      Tenderness: There is no abdominal tenderness.   Musculoskeletal:      Right lower leg: No edema.      Left lower leg: No edema.   Neurological:      Mental Status: He is alert and oriented to person, place, and time. Mental status is at baseline.   Psychiatric:         Mood and Affect: Mood normal.         Behavior: Behavior normal.           Lines/Drains:    Urinary Catheter:  Goal for removal: N/A - Chronic Sparks                 Lab  Results: I have reviewed the following results:   Results from last 7 days   Lab Units 01/11/25  0613   WBC Thousand/uL 17.62*   HEMOGLOBIN g/dL 11.4*   HEMATOCRIT % 35.6*   PLATELETS Thousands/uL 171   SEGS PCT % 88*   LYMPHO PCT % 3*   MONO PCT % 8   EOS PCT % 0     Results from last 7 days   Lab Units 01/11/25  0613   SODIUM mmol/L 139   POTASSIUM mmol/L 4.6   CHLORIDE mmol/L 108   CO2 mmol/L 26   BUN mg/dL 26*   CREATININE mg/dL 1.07   ANION GAP mmol/L 5   CALCIUM mg/dL 7.5*   ALBUMIN g/dL 3.0*   TOTAL BILIRUBIN mg/dL 1.09*   ALK PHOS U/L 39   ALT U/L 3*   AST U/L 8*   GLUCOSE RANDOM mg/dL 112         Results from last 7 days   Lab Units 01/10/25  1909   POC GLUCOSE mg/dl 131         Results from last 7 days   Lab Units 01/10/25  2210   LACTIC ACID mmol/L 0.6       Recent Cultures (last 7 days):   Results from last 7 days   Lab Units 01/10/25  2025   BLOOD CULTURE  Received in Microbiology Lab. Culture in Progress.  Received in Microbiology Lab. Culture in Progress.       Imaging Results Review: I reviewed radiology reports from this admission including: chest xray.  Other Study Results Review: EKG was reviewed.     Last 24 Hours Medication List:     Current Facility-Administered Medications:     acetaminophen (TYLENOL) tablet 650 mg, Q6H PRN    apixaban (ELIQUIS) tablet 5 mg, BID    carbidopa-levodopa (SINEMET)  mg per tablet 1 tablet, TID    cefTRIAXone (ROCEPHIN) IVPB (premix in dextrose) 1,000 mg 50 mL, Q24H    Cholecalciferol (VITAMIN D3) tablet 2,000 Units, Daily    docusate sodium (COLACE) capsule 100 mg, BID    gabapentin (NEURONTIN) capsule 100 mg, HS    levothyroxine tablet 100 mcg, Early Morning    lisinopril (ZESTRIL) tablet 2.5 mg, Daily    ondansetron (ZOFRAN) injection 4 mg, Q6H PRN    polyethylene glycol (MIRALAX) packet 17 g, Daily PRN    polyethylene glycol (MIRALAX) packet 17 g, HS    senna (SENOKOT) tablet 8.6 mg, Daily    sodium chloride 0.9 % infusion, Continuous, Last Rate: 75 mL/hr  (01/10/25 8474)    ticagrelor tablet 60 mg, Q12H SAM    Administrative Statements   Today, Patient Was Seen By: Whit Gu MD  I have spent a total time of 30 minutes in caring for this patient on the day of the visit/encounter including Diagnostic results, Prognosis, Risks and benefits of tx options, and Instructions for management.    **Please Note: This note may have been constructed using a voice recognition system.**

## 2025-01-11 NOTE — ASSESSMENT & PLAN NOTE
Continue PTA medications    -Eliquis was on hold since 12/24 secondary to gross hematuria after inserting Sparks's catheter.  -Hematuria resolved.  Restart Eliquis

## 2025-01-11 NOTE — H&P
H&P - Hospitalist   Name: Pavan Edward 89 y.o. male I MRN: [id number]  Unit/Bed#: [address] [place] [address] Date of Admission: 1/10/2025   Date of Service: 1/11/2025 I Hospital Day: 1     Assessment & Plan  Complicated UTI (urinary tract infection)  POA with generalized weakness and worsening parkinson symptoms the last 3 days  Follows with urology, benign prostate hyperplasia with urinary retention , Balderrama catheter in place in Ed on 12/25/24  Chart review performed: Seen in urology's office 1/7/2025: Urinary retention  for voiding trial failed balderrama replaced  In Ed noted, Leukocytosis WBCs 17.72, lactic acid and Pro-Zi negative, afebrile  UA positive for nitrates, blood, micro positive for innumerable RBC, WBC, and moderate bacteria   Denies any abdominal pain, pelvic pain no hematuria noted  In Ed started on Ceftriaxone 1000 mg IV q 24 hrs, continue antbx  Suspect etiology secondary to UTI  Follow-up on blood cultures x 2  Follow-up on urine cultures adjust antibiotics accordingly  Maintain balderrama catheter   Urology consulted appreciate recs  PT/OT Consulted  CAD (coronary artery disease)  Follow with cardiology  Denies chest pain or palpations   S/p PCI x2 on 8/13/2023  Home medication Brilinta was on hold in the setting of hematuria, patient wife restarted because he has not been on it for some time  Chart review performed:  ED 12/30/24 for intermittent hematuria ongoing in the emergency department likely related to traumatic Balderrama in the setting of anticoagulation  Urology consulted appreciate recommendations to restart Brillanta and Elqiuis   Hypertension  Home medication lisinopril continued   Paroxysmal atrial fibrillation (HCC)  Home medication Eliquis bid on hold in the setting of hematuria per wife  Parkinson's disease (HCC)  Chronic   Home medication carbidopa-levodopa continued  PT consulted   Pain in both lower extremities  POA with bilateral leg pain behind his knees and swelling  Per wife patient Eliquis and  ticagrelor have been on hold since he developed hematuria   Obtain venous study r/oDVT  PRN Tylenol for pain  Elevated lower extremities   PAD (peripheral artery disease) ([facility])  Followed by vascular surgery  Home medicaiton  Brilinta 60 mg every 12 hours, per wife on hold in setting of hematuria and she recently started yesterday           VTE Pharmacologic Prophylaxis:   High Risk (Score >/= 5) - Pharmacological DVT Prophylaxis Contraindicated. Sequential Compression Devices Ordered.  Code Status: Level 3 - DNAR and DNI with wife and patient  Discussion with family: Updated  (wife) at bedside.    Anticipated Length of Stay: Patient will be admitted on an inpatient basis with an anticipated length of stay of greater than 2 midnights secondary to complicated UTI requiring Iv antbxs.    History of Present Illness   Chief Complaint: generalized weakness and worsening parkinson symptoms the last 3 days    Pavan Edward is a 89 y.o. male with a PMH of A-fib on Eliquis hypertension CAD Parkinson's CVA history aortic aneurysm PE had a Sparks placed in ED in 1225 who presents with generalized weakness and worsening parkinson symptoms the last 3 days.  Per wife, patient has a Sparks catheter due to obstruction and was seen by urology on the 1/7 for voiding trial and and Sparks catheter was replaced.  Per wife patient anticoagulation has been on hold due to hematuria on 12/30/24.  Patient denies abdominal pain, shortness of breath, pelvic pain, or CVA tenderness.  Discussed CODE STATUS with wife patient is a DNR DNI level 3.     Review of Systems   Constitutional:  Positive for activity change. Negative for chills and fever.   HENT:  Negative for ear pain and sore throat.    Eyes:  Negative for pain and visual disturbance.   Respiratory:  Negative for cough and shortness of breath.    Cardiovascular:  Positive for leg swelling. Negative for chest pain and palpitations.   Gastrointestinal:  Negative for abdominal  pain and vomiting.   Genitourinary:  Negative for dysuria and hematuria.   Musculoskeletal:  Positive for arthralgias and gait problem. Negative for back pain.   Skin:  Negative for color change and rash.   Neurological:  Positive for weakness. Negative for seizures and syncope.        Generalized weakness    All other systems reviewed and are negative.      Historical Information   Past Medical History:   Diagnosis Date    AA (aortic aneurysm) ([facility])     Coronary artery disease     CVA (cerebral vascular accident) ([facility])     Parkinsons ([facility])     Pulmonary emboli ([facility])      Past Surgical History:   Procedure Laterality Date    CATARACT EXTRACTION      CORONARY ANGIOPLASTY WITH STENT PLACEMENT       Social History     Tobacco Use    Smoking status: Never    Smokeless tobacco: Never   Vaping Use    Vaping status: Never Used   Substance and Sexual Activity    Alcohol use: Never    Drug use: Never    Sexual activity: Not on file     E-Cigarette/Vaping    E-Cigarette Use Never User      E-Cigarette/Vaping Substances    Nicotine No     THC No     CBD No     Flavoring No     Other No     Unknown No        Social History:  Marital Status: /Civil Union   Occupation: retired   Patient Pre-hospital Living Situation: Home  Patient Pre-hospital Level of Mobility: walks with walker  Patient Pre-hospital Diet Restrictions: none    Meds/Allergies   I have reviewed home medications with patient family member.  Prior to Admission medications    Medication Sig Start Date End Date Taking? Authorizing Provider   carbidopa-levodopa (SINEMET)  mg per tablet Take 1 tablet by mouth 3 (three) times a day 11/20/24  Yes Historical Provider, MD   cephalexin (KEFLEX) 500 mg capsule Take 1 capsule (500 mg total) by mouth every 12 (twelve) hours for 5 days 1/7/25 1/12/25 Yes Hilton Mendez PA-C   Cholecalciferol 50 MCG (2000 UT) TABS Take 50 mcg by mouth daily   Yes Historical Provider, MD   levothyroxine 100 mcg tablet Take 1  tablet (100 mcg total) by mouth daily in the early morning 8/25/24  Yes Reece Parks MD   lisinopril (ZESTRIL) 2.5 mg tablet Take 1 tablet (2.5 mg total) by mouth daily 8/25/24  Yes Reece Parks MD   ticagrelor (Brilinta) 60 MG Take 60 mg by mouth every 12 (twelve) hours   Yes Historical Provider, MD   apixaban (Eliquis) 5 mg Take 5 mg by mouth 2 (two) times a day  Patient not taking: Reported on 1/7/2025    Historical Provider, MD   atorvastatin (LIPITOR) 20 mg tablet Take 1 tablet (20 mg total) by mouth daily  Patient not taking: Reported on 1/7/2025 12/3/24   Matthias Gonzalez MD   cyanocobalamin (VITAMIN B-12) 1000 MCG tablet Take 1 tablet (1,000 mcg total) by mouth daily  Patient not taking: Reported on 11/1/2024 8/25/24   Reece Parks MD   guaiFENesin (ROBITUSSIN) 200 MG/10ML oral liquid Take 10 mL (200 mg total) by mouth every 4 (four) hours as needed (cough)  Patient not taking: Reported on 11/1/2024 8/24/24   Reece Parks MD   polyethylene glycol (MIRALAX) 17 g packet Take 17 g by mouth daily as needed (constipation) 8/24/24   Reece Parks MD     No Known Allergies    Objective :  Temp:  [97.8 °F (36.6 °C)-99.1 °F (37.3 °C)] 99.1 °F (37.3 °C)  HR:  [79-85] 79  BP: (121-168)/(57-75) 121/57  Resp:  [18] 18  SpO2:  [93 %-95 %] 94 %  O2 Device: None (Room air)    Physical Exam  Vitals and nursing note reviewed.   Constitutional:       General: He is not in acute distress.     Appearance: He is well-developed.   HENT:      Head: Normocephalic and atraumatic.   Eyes:      Conjunctiva/sclera: Conjunctivae normal.   Cardiovascular:      Rate and Rhythm: Normal rate and regular rhythm.      Heart sounds: No murmur heard.  Pulmonary:      Effort: Pulmonary effort is normal. No respiratory distress.      Breath sounds: Normal breath sounds.   Abdominal:      Palpations: Abdomen is soft.      Tenderness: There is no abdominal tenderness.   Musculoskeletal:         General: No swelling.      Cervical back: Neck supple.  "  Skin:     General: Skin is warm and dry.      Capillary Refill: Capillary refill takes less than 2 seconds.   Neurological:      General: No focal deficit present.      Mental Status: He is alert. Mental status is at baseline.      Motor: Weakness present.      Gait: Gait abnormal.      Comments: Generalized weakness and Parkinson disease.    Psychiatric:         Mood and Affect: Mood normal.          Lines/Drains:    Urinary Catheter:  Goal for removal: N/A - Chronic Sparks               Lab Results: I have reviewed the following results:  Results from last 7 days   Lab Units 01/10/25  1933   WBC Thousand/uL 17.72*   HEMOGLOBIN g/dL 12.0   HEMATOCRIT % 36.7   PLATELETS Thousands/uL 167   SEGS PCT % 92*   LYMPHO PCT % 2*   MONO PCT % 4   EOS PCT % 1     Results from last 7 days   Lab Units 01/10/25  1933   SODIUM mmol/L 137   POTASSIUM mmol/L 4.5   CHLORIDE mmol/L 108   CO2 mmol/L 26   BUN mg/dL 25   CREATININE mg/dL 0.99   ANION GAP mmol/L 3*   CALCIUM mg/dL 8.4   ALBUMIN g/dL 3.6   TOTAL BILIRUBIN mg/dL 0.81   ALK PHOS U/L 41   ALT U/L 3*   AST U/L 10*   GLUCOSE RANDOM mg/dL 141*         Results from last 7 days   Lab Units 01/10/25  1909   POC GLUCOSE mg/dl 131     No results found for: \"HGBA1C\"  Results from last 7 days   Lab Units 01/10/25  2210   LACTIC ACID mmol/L 0.6       Imaging Results Review: No pertinent imaging studies reviewed.  Other Study Results Review: EKG was reviewed.  EKG was personally reviewed and my interpretation is: Personally Reviewed. NSR. HR 87..    Administrative Statements       ** Please Note: This note has been constructed using a voice recognition system. **    "

## 2025-01-11 NOTE — PLAN OF CARE
Problem: Potential for Falls  Goal: Patient will remain free of falls  Description: INTERVENTIONS:  - Educate patient/family on patient safety including physical limitations  - Instruct patient to call for assistance with activity   - Consult OT/PT to assist with strengthening/mobility   - Keep Call bell within reach  - Keep bed low and locked with side rails adjusted as appropriate  - Keep care items and personal belongings within reach  - Initiate and maintain comfort rounds  - Make Fall Risk Sign visible to staff  - Offer Toileting every 2 Hours, in advance of need  - Initiate/Maintain bed alarm  - Obtain necessary fall risk management equipment: walker  - Apply yellow socks and bracelet for high fall risk patients  - Consider moving patient to room near nurses station  Outcome: Progressing     Problem: PAIN - ADULT  Goal: Verbalizes/displays adequate comfort level or baseline comfort level  Description: Interventions:  - Encourage patient to monitor pain and request assistance  - Assess pain using appropriate pain scale  - Administer analgesics based on type and severity of pain and evaluate response  - Implement non-pharmacological measures as appropriate and evaluate response  - Consider cultural and social influences on pain and pain management  - Notify physician/advanced practitioner if interventions unsuccessful or patient reports new pain  Outcome: Progressing     Problem: INFECTION - ADULT  Goal: Absence or prevention of progression during hospitalization  Description: INTERVENTIONS:  - Assess and monitor for signs and symptoms of infection  - Monitor lab/diagnostic results  - Monitor all insertion sites, i.e. indwelling lines, tubes, and drains  - Monitor endotracheal if appropriate and nasal secretions for changes in amount and color  - Guin appropriate cooling/warming therapies per order  - Administer medications as ordered  - Instruct and encourage patient and family to use good hand hygiene  technique  - Identify and instruct in appropriate isolation precautions for identified infection/condition  Outcome: Progressing  Goal: Absence of fever/infection during neutropenic period  Description: INTERVENTIONS:  - Monitor WBC    Outcome: Progressing     Problem: SAFETY ADULT  Goal: Maintain or return to baseline ADL function  Description: INTERVENTIONS:  -  Assess patient's ability to carry out ADLs; assess patient's baseline for ADL function and identify physical deficits which impact ability to perform ADLs (bathing, care of mouth/teeth, toileting, grooming, dressing, etc.)  - Assess/evaluate cause of self-care deficits   - Assess range of motion  - Assess patient's mobility; develop plan if impaired  - Assess patient's need for assistive devices and provide as appropriate  - Encourage maximum independence but intervene and supervise when necessary  - Involve family in performance of ADLs  - Assess for home care needs following discharge   - Consider OT consult to assist with ADL evaluation and planning for discharge  - Provide patient education as appropriate  Outcome: Progressing  Goal: Maintains/Returns to pre admission functional level  Description: INTERVENTIONS:  - Perform AM-PAC 6 Click Basic Mobility/ Daily Activity assessment daily.  - Set and communicate daily mobility goal to care team and patient/family/caregiver.   - Collaborate with rehabilitation services on mobility goals if consulted  - Perform Range of Motion 3 times a day.  - Reposition patient every 2 hours.  - Dangle patient 3 times a day  - Stand patient 3 times a day  - Ambulate patient 3 times a day  - Out of bed to chair 3 times a day   - Out of bed for meals 3 times a day  - Out of bed for toileting  - Record patient progress and toleration of activity level   Outcome: Progressing     Problem: DISCHARGE PLANNING  Goal: Discharge to home or other facility with appropriate resources  Description: INTERVENTIONS:  - Identify barriers  to discharge w/patient and caregiver  - Arrange for needed discharge resources and transportation as appropriate  - Identify discharge learning needs (meds, wound care, etc.)  - Arrange for interpretive services to assist at discharge as needed  - Refer to Case Management Department for coordinating discharge planning if the patient needs post-hospital services based on physician/advanced practitioner order or complex needs related to functional status, cognitive ability, or social support system  Outcome: Progressing     Problem: Knowledge Deficit  Goal: Patient/family/caregiver demonstrates understanding of disease process, treatment plan, medications, and discharge instructions  Description: Complete learning assessment and assess knowledge base.  Interventions:  - Provide teaching at level of understanding  - Provide teaching via preferred learning methods  Outcome: Progressing     Problem: Prexisting or High Potential for Compromised Skin Integrity  Goal: Skin integrity is maintained or improved  Description: INTERVENTIONS:  - Identify patients at risk for skin breakdown  - Assess and monitor skin integrity  - Assess and monitor nutrition and hydration status  - Monitor labs   - Assess for incontinence   - Turn and reposition patient  - Assist with mobility/ambulation  - Relieve pressure over bony prominences  - Avoid friction and shearing  - Provide appropriate hygiene as needed including keeping skin clean and dry  - Evaluate need for skin moisturizer/barrier cream  - Collaborate with interdisciplinary team   - Patient/family teaching  - Consider wound care consult   Outcome: Progressing

## 2025-01-12 ENCOUNTER — APPOINTMENT (INPATIENT)
Dept: RADIOLOGY | Facility: HOSPITAL | Age: OVER 89
End: 2025-01-12
Payer: COMMERCIAL

## 2025-01-12 PROBLEM — R78.81 GRAM-NEGATIVE BACTEREMIA: Status: ACTIVE | Noted: 2025-01-12

## 2025-01-12 PROBLEM — G93.41 ACUTE METABOLIC ENCEPHALOPATHY: Status: ACTIVE | Noted: 2025-01-12

## 2025-01-12 LAB
ALBUMIN SERPL BCG-MCNC: 2.9 G/DL (ref 3.5–5)
ALP SERPL-CCNC: 40 U/L (ref 34–104)
ALT SERPL W P-5'-P-CCNC: 4 U/L (ref 7–52)
ANION GAP SERPL CALCULATED.3IONS-SCNC: 6 MMOL/L (ref 4–13)
AST SERPL W P-5'-P-CCNC: 8 U/L (ref 13–39)
BASOPHILS # BLD AUTO: 0.03 THOUSANDS/ΜL (ref 0–0.1)
BASOPHILS NFR BLD AUTO: 0 % (ref 0–1)
BILIRUB SERPL-MCNC: 0.74 MG/DL (ref 0.2–1)
BUN SERPL-MCNC: 19 MG/DL (ref 5–25)
CALCIUM ALBUM COR SERPL-MCNC: 8.5 MG/DL (ref 8.3–10.1)
CALCIUM SERPL-MCNC: 7.6 MG/DL (ref 8.4–10.2)
CHLORIDE SERPL-SCNC: 108 MMOL/L (ref 96–108)
CO2 SERPL-SCNC: 25 MMOL/L (ref 21–32)
CREAT SERPL-MCNC: 0.84 MG/DL (ref 0.6–1.3)
EOSINOPHIL # BLD AUTO: 0.11 THOUSAND/ΜL (ref 0–0.61)
EOSINOPHIL NFR BLD AUTO: 1 % (ref 0–6)
ERYTHROCYTE [DISTWIDTH] IN BLOOD BY AUTOMATED COUNT: 12.8 % (ref 11.6–15.1)
GFR SERPL CREATININE-BSD FRML MDRD: 77 ML/MIN/1.73SQ M
GLUCOSE SERPL-MCNC: 94 MG/DL (ref 65–140)
HCT VFR BLD AUTO: 36.3 % (ref 36.5–49.3)
HGB BLD-MCNC: 11.9 G/DL (ref 12–17)
IMM GRANULOCYTES # BLD AUTO: 0.1 THOUSAND/UL (ref 0–0.2)
IMM GRANULOCYTES NFR BLD AUTO: 1 % (ref 0–2)
LYMPHOCYTES # BLD AUTO: 0.45 THOUSANDS/ΜL (ref 0.6–4.47)
LYMPHOCYTES NFR BLD AUTO: 3 % (ref 14–44)
MAGNESIUM SERPL-MCNC: 1.9 MG/DL (ref 1.9–2.7)
MCH RBC QN AUTO: 29.6 PG (ref 26.8–34.3)
MCHC RBC AUTO-ENTMCNC: 32.8 G/DL (ref 31.4–37.4)
MCV RBC AUTO: 90 FL (ref 82–98)
MONOCYTES # BLD AUTO: 0.82 THOUSAND/ΜL (ref 0.17–1.22)
MONOCYTES NFR BLD AUTO: 6 % (ref 4–12)
NEUTROPHILS # BLD AUTO: 11.63 THOUSANDS/ΜL (ref 1.85–7.62)
NEUTS SEG NFR BLD AUTO: 89 % (ref 43–75)
NRBC BLD AUTO-RTO: 0 /100 WBCS
PLATELET # BLD AUTO: 151 THOUSANDS/UL (ref 149–390)
PMV BLD AUTO: 11.3 FL (ref 8.9–12.7)
POTASSIUM SERPL-SCNC: 4.4 MMOL/L (ref 3.5–5.3)
PROT SERPL-MCNC: 5.4 G/DL (ref 6.4–8.4)
RBC # BLD AUTO: 4.02 MILLION/UL (ref 3.88–5.62)
SODIUM SERPL-SCNC: 139 MMOL/L (ref 135–147)
WBC # BLD AUTO: 13.14 THOUSAND/UL (ref 4.31–10.16)

## 2025-01-12 PROCEDURE — 85025 COMPLETE CBC W/AUTO DIFF WBC: CPT

## 2025-01-12 PROCEDURE — 99232 SBSQ HOSP IP/OBS MODERATE 35: CPT

## 2025-01-12 PROCEDURE — 71045 X-RAY EXAM CHEST 1 VIEW: CPT

## 2025-01-12 PROCEDURE — 80053 COMPREHEN METABOLIC PANEL: CPT

## 2025-01-12 PROCEDURE — 83735 ASSAY OF MAGNESIUM: CPT

## 2025-01-12 PROCEDURE — 70450 CT HEAD/BRAIN W/O DYE: CPT

## 2025-01-12 RX ORDER — AMLODIPINE BESYLATE 5 MG/1
5 TABLET ORAL DAILY
Status: DISCONTINUED | OUTPATIENT
Start: 2025-01-13 | End: 2025-01-17 | Stop reason: HOSPADM

## 2025-01-12 RX ORDER — LISINOPRIL 5 MG/1
5 TABLET ORAL DAILY
Status: DISCONTINUED | OUTPATIENT
Start: 2025-01-13 | End: 2025-01-15

## 2025-01-12 RX ORDER — CEFEPIME HYDROCHLORIDE 2 G/50ML
2000 INJECTION, SOLUTION INTRAVENOUS EVERY 12 HOURS
Status: DISCONTINUED | OUTPATIENT
Start: 2025-01-12 | End: 2025-01-13

## 2025-01-12 RX ORDER — AMLODIPINE BESYLATE 2.5 MG/1
2.5 TABLET ORAL ONCE
Status: COMPLETED | OUTPATIENT
Start: 2025-01-12 | End: 2025-01-12

## 2025-01-12 RX ORDER — CEFEPIME HYDROCHLORIDE 1 G/50ML
1000 INJECTION, SOLUTION INTRAVENOUS EVERY 12 HOURS
Status: DISCONTINUED | OUTPATIENT
Start: 2025-01-12 | End: 2025-01-12

## 2025-01-12 RX ORDER — POLYETHYLENE GLYCOL 3350 17 G/17G
17 POWDER, FOR SOLUTION ORAL ONCE
Status: COMPLETED | OUTPATIENT
Start: 2025-01-12 | End: 2025-01-12

## 2025-01-12 RX ADMIN — SENNOSIDES 8.6 MG: 8.6 TABLET, FILM COATED ORAL at 08:21

## 2025-01-12 RX ADMIN — CEFEPIME HYDROCHLORIDE 1000 MG: 1 INJECTION, SOLUTION INTRAVENOUS at 01:33

## 2025-01-12 RX ADMIN — CEFEPIME HYDROCHLORIDE 2000 MG: 2 INJECTION, SOLUTION INTRAVENOUS at 13:56

## 2025-01-12 RX ADMIN — TICAGRELOR 60 MG: 60 TABLET ORAL at 08:21

## 2025-01-12 RX ADMIN — CARBIDOPA AND LEVODOPA 1 TABLET: 25; 100 TABLET ORAL at 21:39

## 2025-01-12 RX ADMIN — CARBIDOPA AND LEVODOPA 1 TABLET: 25; 100 TABLET ORAL at 08:21

## 2025-01-12 RX ADMIN — CARBIDOPA AND LEVODOPA 1 TABLET: 25; 100 TABLET ORAL at 16:46

## 2025-01-12 RX ADMIN — LISINOPRIL 2.5 MG: 2.5 TABLET ORAL at 09:30

## 2025-01-12 RX ADMIN — AMLODIPINE BESYLATE 2.5 MG: 2.5 TABLET ORAL at 09:30

## 2025-01-12 RX ADMIN — CHOLECALCIFEROL (VITAMIN D3) 10 MCG (400 UNIT) TABLET 2000 UNITS: at 08:21

## 2025-01-12 RX ADMIN — DOCUSATE SODIUM 100 MG: 100 CAPSULE, LIQUID FILLED ORAL at 08:21

## 2025-01-12 RX ADMIN — AMLODIPINE BESYLATE 2.5 MG: 2.5 TABLET ORAL at 17:36

## 2025-01-12 RX ADMIN — LEVOTHYROXINE SODIUM 100 MCG: 100 TABLET ORAL at 05:40

## 2025-01-12 RX ADMIN — APIXABAN 5 MG: 5 TABLET, FILM COATED ORAL at 08:21

## 2025-01-12 RX ADMIN — TAMSULOSIN HYDROCHLORIDE 0.4 MG: 0.4 CAPSULE ORAL at 16:46

## 2025-01-12 RX ADMIN — POLYETHYLENE GLYCOL 3350 17 G: 17 POWDER, FOR SOLUTION ORAL at 16:46

## 2025-01-12 RX ADMIN — POLYETHYLENE GLYCOL 3350 17 G: 17 POWDER, FOR SOLUTION ORAL at 08:26

## 2025-01-12 RX ADMIN — DOCUSATE SODIUM 100 MG: 100 CAPSULE, LIQUID FILLED ORAL at 17:36

## 2025-01-12 RX ADMIN — POLYETHYLENE GLYCOL 3350 17 G: 17 POWDER, FOR SOLUTION ORAL at 21:39

## 2025-01-12 RX ADMIN — APIXABAN 5 MG: 5 TABLET, FILM COATED ORAL at 17:36

## 2025-01-12 RX ADMIN — TICAGRELOR 60 MG: 60 TABLET ORAL at 21:39

## 2025-01-12 RX ADMIN — GABAPENTIN 100 MG: 100 CAPSULE ORAL at 21:39

## 2025-01-12 NOTE — ASSESSMENT & PLAN NOTE
POA with generalized weakness and worsening parkinson symptoms the last 3 days  Follows with urology, benign prostate hyperplasia with urinary retention , Balderrama catheter in place in Ed on 12/25/24  Chart review performed: Seen in urology's office 1/7/2025: Urinary retention  for voiding trial failed balderrama replaced  In Ed noted, Leukocytosis WBCs 17.72, lactic acid and Pro-Zi negative, afebrile  UA positive for nitrates, blood, micro positive for innumerable RBC, WBC, and moderate bacteria     Denies any abdominal pain, pelvic pain no hematuria noted  Started on ceftriaxone << started on cefepime 2 g Q 8 given positive blood cultures.   Follow-up with urine culture  Balderrama's catheter remains in place.  Consult urology  Consult infectious disease   Interval follow-up  Trend fevers, CBC, vitals

## 2025-01-12 NOTE — PROGRESS NOTES
"Progress Note - Urology      Patient: Pavan Edward   : 1935 Sex: male   MRN: [id number]     CSN: [id number]  Unit/Bed#: [address] [place] [address]     SUBJECTIVE:   Pt seenrounds  No issue balderrama  Discussed possibly deseeding catheter after constipation has cleared stating that he has no issues with the catheter at least at this moment he prefer to continue with it since he is not ambulating out of bed      Objective   Vitals: /67   Pulse 73   Temp 97.7 °F (36.5 °C)   Resp 18   Ht 6' 1\" (1.854 m)   Wt 81.6 kg (180 lb)   SpO2 95%   BMI 23.75 kg/m²     I/O last 24 hours:  In: -   Out: 0 [Urine:0]      Physical Exam:   General Alert awake   Normocephalic atraumatic PERRLA  Lungs clear bilaterally  Cardiac normal S1 normal S2  Abdomen soft, flank pain  Balderrama draining clear urine  Extremities no edema      Lab Results: CBC:   Lab Results   Component Value Date    WBC 13.14 (H) 2025    HGB 11.9 (L) 2025    HCT 36.3 (L) 2025    MCV 90 2025     2025    RBC 4.02 2025    MCH 29.6 2025    MCHC 32.8 2025    RDW 12.8 2025    MPV 11.3 2025    NRBC 0 2025     CMP:   Lab Results   Component Value Date     2025    CO2 25 2025    BUN 19 2025    CREATININE 0.84 2025    CALCIUM 7.6 (L) 2025    AST 8 (L) 2025    ALT 4 (L) 2025    ALKPHOS 40 2025    EGFR 77 2025     Urinalysis:   Lab Results   Component Value Date    COLORU Yellow 01/10/2025    CLARITYU Clear 01/10/2025    SPECGRAV 1.025 01/10/2025    PHUR 6.5 01/10/2025    LEUKOCYTESUR Large (A) 01/10/2025    NITRITE Positive (A) 01/10/2025    GLUCOSEU Negative 01/10/2025    KETONESU Negative 01/10/2025    BILIRUBINUR Negative 01/10/2025    BLOODU Large (A) 01/10/2025     Urine Culture: No results found for: \"URINECX\"  PSA: No results found for: \"PSA\"      Assessment/ Plan:  Urinary retention  Balderrama trauma  Complicated uti  Awaiting " cultures  Continue catheter as per patient wishes would need to see [facility]'s urology for follow-up for another voiding trial            Oliver Cherry MD

## 2025-01-12 NOTE — ASSESSMENT & PLAN NOTE
Pseudomonas aeruginosa + blood culture     -mostly of urine origin given current acute cystitis    -started on cefepime 2 g Q8   -consult infectious disease team

## 2025-01-12 NOTE — ASSESSMENT & PLAN NOTE
Per patient and significant other at bedside he developed gross hematuria after inserting Sparks's catheter on 12/24/2024 he was advised to stop Eliquis and Brilinta given hematuria by emergency medicine provider.  Family reports gross hematuria resolved several days ago.  Brilinta was restarted yesterday per family.    -Currently clear urine noted at bedside in Sparks's catheter bag.  -Restart Eliquis  -Continue Brilinta  -Continue to monitor    -remains with yellow colored urine. No hematuria . Continue to monitor

## 2025-01-12 NOTE — PROGRESS NOTES
Progress Note - Hospitalist   Name: Pavan Edward 89 y.o. male I MRN: [id number]  Unit/Bed#: 3 [address] I Date of Admission: 1/10/2025   Date of Service: 1/12/2025 I Hospital Day: 2    Assessment & Plan  Complicated UTI (urinary tract infection)  POA with generalized weakness and worsening parkinson symptoms the last 3 days  Follows with urology, benign prostate hyperplasia with urinary retention , Balderrama catheter in place in Ed on 12/25/24  Chart review performed: Seen in urology's office 1/7/2025: Urinary retention  for voiding trial failed balderrama replaced  In Ed noted, Leukocytosis WBCs 17.72, lactic acid and Pro-Zi negative, afebrile  UA positive for nitrates, blood, micro positive for innumerable RBC, WBC, and moderate bacteria     Denies any abdominal pain, pelvic pain no hematuria noted  Started on ceftriaxone << started on cefepime 2 g Q 8 given positive blood cultures.   Follow-up with urine culture  Balderrama's catheter remains in place.  Consult urology  Consult infectious disease   Interval follow-up  Trend fevers, CBC, vitals  CAD (coronary artery disease)  Follow with cardiology  Denies chest pain or palpations   S/p PCI x2 on 8/13/2023  Brilinta was on hold on 12/24 per ED given gross hematuria.  However it was traumatic secondary to Balderrama's catheter insertion.  Currently hematuria cleared per bedside exam and patient.  Brillinta continued during hospitalization      Hypertension  Home medication lisinopril continued   Paroxysmal atrial fibrillation (HCC)  Continue PTA medications    -Eliquis was on hold since 12/24 secondary to gross hematuria after inserting Balderrama's catheter.  -will continue eliquis . And monitor for hematuria recurrence   Parkinson's disease (HCC)  Chronic   Home medication carbidopa-levodopa continued  PT consulted   Pain in both lower extremities  POA with bilateral leg pain behind his knees and swelling  Per wife patient Eliquis and ticagrelor have been on hold since he developed  hematuria   Obtain venous study r/oDVT  PRN Tylenol for pain  Elevated lower extremities   PAD (peripheral artery disease) ([facility])  Followed by vascular surgery  Home medicaiton  Brilinta 60 mg every 12 hours      Gross hematuria  Per patient and significant other at bedside he developed gross hematuria after inserting Sparks's catheter on 12/24/2024 he was advised to stop Eliquis and Brilinta given hematuria by emergency medicine provider.  Family reports gross hematuria resolved several days ago.  Brilinta was restarted yesterday per family.    -Currently clear urine noted at bedside in Sparks's catheter bag.  -Restart Eliquis  -Continue Brilinta  -Continue to monitor    -remains with yellow colored urine. No hematuria . Continue to monitor   Gram-negative bacteremia  Pseudomonas aeruginosa + blood culture     -mostly of urine origin given current acute cystitis    -started on cefepime 2 g Q8   -consult infectious disease team    Acute metabolic encephalopathy  Noted per family at bedside.   History of hospital delirium per family at bedside. Similar to this time.     Patient is cooperative, answering quesitons   Alert and oriented to person only at this time .  Significant change from yesterday     Probably multifactorial in the setting of hospitalization / deconditioning / active infection / antibiotic use.   No focal deficit identified.     -check Ct head.   -delirium precautions  -minimize lab tests as possible   -arrange medication timings   -Minimize opioids/benzodiazepine use.      VTE Pharmacologic Prophylaxis:   Moderate Risk (Score 3-4) - Pharmacological DVT Prophylaxis Ordered: apixaban (Eliquis).    Mobility:   Basic Mobility Inpatient Raw Score: 11  JH-HLM Goal: 4: Move to chair/commode  JH-HLM Achieved: 2: Bed activities/Dependent transfer  JH-HLM Goal achieved. Continue to encourage appropriate mobility.    Patient Centered Rounds: I performed bedside rounds with nursing staff today.   Discussions with  Specialists or Other Care Team Provider: Infectious disease, urology    Education and Discussions with Family / Patient: Updated  (significant other) at bedside.    Current Length of Stay: 2 day(s)  Current Patient Status: Inpatient   Certification Statement: The patient will continue to require additional inpatient hospital stay due to gram-negative bacteremia, acute metabolic encephalopathy  Discharge Plan: Anticipate discharge in 24-48 hrs to home with home services.    Code Status: Level 3 - DNAR and DNI    Subjective   Patient seen today at bedside.  He is alert and oriented to person only.  Cooperative and answering questions.  No focal deficits noted.  Family at bedside reports patient with history of sundowning when hospitalized.     Patient is answering questions appropriately.  No active complaints.      Objective :  Temp:  [97.7 °F (36.5 °C)-100 °F (37.8 °C)] 97.7 °F (36.5 °C)  HR:  [64-73] 73  BP: (148-176)/(65-85) 155/67  Resp:  [18-20] 18  SpO2:  [95 %-97 %] 95 %  O2 Device: None (Room air)    Body mass index is 23.75 kg/m².     Input and Output Summary (last 24 hours):     Intake/Output Summary (Last 24 hours) at 1/12/2025 1111  Last data filed at 1/12/2025 0801  Gross per 24 hour   Intake --   Output 1850 ml   Net -1850 ml       Physical Exam  Vitals and nursing note reviewed.   Constitutional:       General: He is not in acute distress.     Appearance: Normal appearance. He is not ill-appearing.   HENT:      Head: Normocephalic and atraumatic.      Mouth/Throat:      Mouth: Mucous membranes are moist.   Eyes:      Conjunctiva/sclera: Conjunctivae normal.      Pupils: Pupils are equal, round, and reactive to light.   Cardiovascular:      Rate and Rhythm: Normal rate and regular rhythm.      Pulses: Normal pulses.           Carotid pulses are 2+ on the right side and 2+ on the left side.       Radial pulses are 2+ on the right side and 2+ on the left side.        Dorsalis pedis pulses  are 2+ on the right side and 2+ on the left side.      Heart sounds: Normal heart sounds, S1 normal and S2 normal. No murmur heard.  Pulmonary:      Effort: No tachypnea, bradypnea or accessory muscle usage.      Breath sounds: Normal breath sounds and air entry. No decreased breath sounds, wheezing, rhonchi or rales.   Abdominal:      General: Abdomen is flat. Bowel sounds are normal. There is no distension.      Palpations: Abdomen is soft.      Tenderness: There is no abdominal tenderness. There is no right CVA tenderness, left CVA tenderness, guarding or rebound.      Comments: Sparks's catheter noted at bedside.  Draining yellow urine with precipitants.  No blood or clots noted.   Musculoskeletal:      Right lower leg: No edema.      Left lower leg: No edema.   Neurological:      Mental Status: He is alert. He is disoriented.   Psychiatric:         Behavior: Behavior normal.           Lines/Drains:    Urinary Catheter:  Goal for removal: N/A - Chronic Sparks                 Lab Results: I have reviewed the following results:   Results from last 7 days   Lab Units 01/12/25  0714   WBC Thousand/uL 13.14*   HEMOGLOBIN g/dL 11.9*   HEMATOCRIT % 36.3*   PLATELETS Thousands/uL 151   SEGS PCT % 89*   LYMPHO PCT % 3*   MONO PCT % 6   EOS PCT % 1     Results from last 7 days   Lab Units 01/12/25  0714   SODIUM mmol/L 139   POTASSIUM mmol/L 4.4   CHLORIDE mmol/L 108   CO2 mmol/L 25   BUN mg/dL 19   CREATININE mg/dL 0.84   ANION GAP mmol/L 6   CALCIUM mg/dL 7.6*   ALBUMIN g/dL 2.9*   TOTAL BILIRUBIN mg/dL 0.74   ALK PHOS U/L 40   ALT U/L 4*   AST U/L 8*   GLUCOSE RANDOM mg/dL 94         Results from last 7 days   Lab Units 01/10/25  1909   POC GLUCOSE mg/dl 131         Results from last 7 days   Lab Units 01/10/25  2210   LACTIC ACID mmol/L 0.6       Recent Cultures (last 7 days):   Results from last 7 days   Lab Units 01/10/25  2025   BLOOD CULTURE  No Growth at 24 hrs.   GRAM STAIN RESULT  Gram negative rods*        Imaging Results Review: No pertinent imaging studies reviewed.  Other Study Results Review: EKG was reviewed.     Last 24 Hours Medication List:     Current Facility-Administered Medications:     acetaminophen (TYLENOL) tablet 650 mg, Q6H PRN    amLODIPine (NORVASC) tablet 2.5 mg, Daily    apixaban (ELIQUIS) tablet 5 mg, BID    carbidopa-levodopa (SINEMET)  mg per tablet 1 tablet, TID    cefepime (MAXIPIME) IVPB (premix in dextrose) 2,000 mg 50 mL, Q12H    Cholecalciferol (VITAMIN D3) tablet 2,000 Units, Daily    docusate sodium (COLACE) capsule 100 mg, BID    gabapentin (NEURONTIN) capsule 100 mg, HS    levothyroxine tablet 100 mcg, Early Morning    lisinopril (ZESTRIL) tablet 2.5 mg, Daily    ondansetron (ZOFRAN) injection 4 mg, Q6H PRN    polyethylene glycol (MIRALAX) packet 17 g, Daily PRN    polyethylene glycol (MIRALAX) packet 17 g, HS    senna (SENOKOT) tablet 8.6 mg, Daily    sodium chloride 0.9 % infusion, Continuous, Last Rate: 75 mL/hr (01/10/25 2312)    tamsulosin (FLOMAX) capsule 0.4 mg, Daily With Dinner    ticagrelor tablet 60 mg, Q12H SAM    Administrative Statements   Today, Patient Was Seen By: Whit Gu MD  I have spent a total time of 30 minutes in caring for this patient on the day of the visit/encounter including Diagnostic results, Prognosis, Risks and benefits of tx options, and Instructions for management.    **Please Note: This note may have been constructed using a voice recognition system.**

## 2025-01-12 NOTE — ASSESSMENT & PLAN NOTE
Noted per family at bedside.   History of hospital delirium per family at bedside. Similar to this time.     Patient is cooperative, answering quesitons   Alert and oriented to person only at this time .  Significant change from yesterday     Probably multifactorial in the setting of hospitalization / deconditioning / active infection / antibiotic use.   No focal deficit identified.     -check Ct head.   -delirium precautions  -minimize lab tests as possible   -arrange medication timings   -Minimize opioids/benzodiazepine use.

## 2025-01-12 NOTE — QUICK NOTE
Notified by Critical lab result on [facility], 1 of 2 Blood Culture sets positive with gram negative rods. Reviewed blood cultre Id: Pseudomonas detected changed to antbx to cefepime and Id consulted.

## 2025-01-12 NOTE — PLAN OF CARE
Problem: Potential for Falls  Goal: Patient will remain free of falls  Description: INTERVENTIONS:  - Educate patient/family on patient safety including physical limitations  - Instruct patient to call for assistance with activity   - Consult OT/PT to assist with strengthening/mobility   - Keep Call bell within reach  - Keep bed low and locked with side rails adjusted as appropriate  - Keep care items and personal belongings within reach  - Initiate and maintain comfort rounds  - Make Fall Risk Sign visible to staff  - Offer Toileting every 2 Hours, in advance of need  - Initiate/Maintain bed alarm  - Obtain necessary fall risk management equipment: walker  - Apply yellow socks and bracelet for high fall risk patients  - Consider moving patient to room near nurses station  Outcome: Progressing     Problem: PAIN - ADULT  Goal: Verbalizes/displays adequate comfort level or baseline comfort level  Description: Interventions:  - Encourage patient to monitor pain and request assistance  - Assess pain using appropriate pain scale  - Administer analgesics based on type and severity of pain and evaluate response  - Implement non-pharmacological measures as appropriate and evaluate response  - Consider cultural and social influences on pain and pain management  - Notify physician/advanced practitioner if interventions unsuccessful or patient reports new pain  Outcome: Progressing     Problem: INFECTION - ADULT  Goal: Absence or prevention of progression during hospitalization  Description: INTERVENTIONS:  - Assess and monitor for signs and symptoms of infection  - Monitor lab/diagnostic results  - Monitor all insertion sites, i.e. indwelling lines, tubes, and drains  - Monitor endotracheal if appropriate and nasal secretions for changes in amount and color  - [place] appropriate cooling/warming therapies per order  - Administer medications as ordered  - Instruct and encourage patient and family to use good hand hygiene  technique  - Identify and instruct in appropriate isolation precautions for identified infection/condition  Outcome: Progressing  Goal: Absence of fever/infection during neutropenic period  Description: INTERVENTIONS:  - Monitor WBC    Outcome: Progressing     Problem: SAFETY ADULT  Goal: Maintain or return to baseline ADL function  Description: INTERVENTIONS:  -  Assess patient's ability to carry out ADLs; assess patient's baseline for ADL function and identify physical deficits which impact ability to perform ADLs (bathing, care of mouth/teeth, toileting, grooming, dressing, etc.)  - Assess/evaluate cause of self-care deficits   - Assess range of motion  - Assess patient's mobility; develop plan if impaired  - Assess patient's need for assistive devices and provide as appropriate  - Encourage maximum independence but intervene and supervise when necessary  - Involve family in performance of ADLs  - Assess for home care needs following discharge   - Consider OT consult to assist with ADL evaluation and planning for discharge  - Provide patient education as appropriate  Outcome: Progressing  Goal: Maintains/Returns to pre admission functional level  Description: INTERVENTIONS:  - Perform AM-PAC 6 Click Basic Mobility/ Daily Activity assessment daily.  - Set and communicate daily mobility goal to care team and patient/family/caregiver.   - Collaborate with rehabilitation services on mobility goals if consulted  - Perform Range of Motion 3 times a day.  - Reposition patient every 2 hours.  - Dangle patient 3 times a day  - Stand patient 3 times a day  - Ambulate patient 3 times a day  - Out of bed to chair 3 times a day   - Out of bed for meals 3 times a day  - Out of bed for toileting  - Record patient progress and toleration of activity level   Outcome: Progressing     Problem: DISCHARGE PLANNING  Goal: Discharge to home or other facility with appropriate resources  Description: INTERVENTIONS:  - Identify barriers  to discharge w/patient and caregiver  - Arrange for needed discharge resources and transportation as appropriate  - Identify discharge learning needs (meds, wound care, etc.)  - Arrange for interpretive services to assist at discharge as needed  - Refer to Case Management Department for coordinating discharge planning if the patient needs post-hospital services based on physician/advanced practitioner order or complex needs related to functional status, cognitive ability, or social support system  Outcome: Progressing     Problem: Knowledge Deficit  Goal: Patient/family/caregiver demonstrates understanding of disease process, treatment plan, medications, and discharge instructions  Description: Complete learning assessment and assess knowledge base.  Interventions:  - Provide teaching at level of understanding  - Provide teaching via preferred learning methods  Outcome: Progressing     Problem: Prexisting or High Potential for Compromised Skin Integrity  Goal: Skin integrity is maintained or improved  Description: INTERVENTIONS:  - Identify patients at risk for skin breakdown  - Assess and monitor skin integrity  - Assess and monitor nutrition and hydration status  - Monitor labs   - Assess for incontinence   - Turn and reposition patient  - Assist with mobility/ambulation  - Relieve pressure over bony prominences  - Avoid friction and shearing  - Provide appropriate hygiene as needed including keeping skin clean and dry  - Evaluate need for skin moisturizer/barrier cream  - Collaborate with interdisciplinary team   - Patient/family teaching  - Consider wound care consult   Outcome: Progressing

## 2025-01-12 NOTE — ASSESSMENT & PLAN NOTE
Follow with cardiology  Denies chest pain or palpations   S/p PCI x2 on 8/13/2023  Brilinta was on hold on 12/24 per ED given gross hematuria.  However it was traumatic secondary to Sparks's catheter insertion.  Currently hematuria cleared per bedside exam and patient.  Brillinta continued during hospitalization

## 2025-01-12 NOTE — PLAN OF CARE
Problem: Potential for Falls  Goal: Patient will remain free of falls  Description: INTERVENTIONS:  - Educate patient/family on patient safety including physical limitations  - Instruct patient to call for assistance with activity   - Consult OT/PT to assist with strengthening/mobility   - Keep Call bell within reach  - Keep bed low and locked with side rails adjusted as appropriate  - Keep care items and personal belongings within reach  - Initiate and maintain comfort rounds  - Make Fall Risk Sign visible to staff  - Offer Toileting every 2 Hours, in advance of need  - Initiate/Maintain bed alarm  - Obtain necessary fall risk management equipment: socks   - Apply yellow socks and bracelet for high fall risk patients  - Consider moving patient to room near nurses station  Outcome: Progressing     Problem: PAIN - ADULT  Goal: Verbalizes/displays adequate comfort level or baseline comfort level  Description: Interventions:  - Encourage patient to monitor pain and request assistance  - Assess pain using appropriate pain scale  - Administer analgesics based on type and severity of pain and evaluate response  - Implement non-pharmacological measures as appropriate and evaluate response  - Consider cultural and social influences on pain and pain management  - Notify physician/advanced practitioner if interventions unsuccessful or patient reports new pain  Outcome: Progressing     Problem: INFECTION - ADULT  Goal: Absence or prevention of progression during hospitalization  Description: INTERVENTIONS:  - Assess and monitor for signs and symptoms of infection  - Monitor lab/diagnostic results  - Monitor all insertion sites, i.e. indwelling lines, tubes, and drains  - Monitor endotracheal if appropriate and nasal secretions for changes in amount and color  - [place] appropriate cooling/warming therapies per order  - Administer medications as ordered  - Instruct and encourage patient and family to use good hand hygiene  technique  - Identify and instruct in appropriate isolation precautions for identified infection/condition  Outcome: Progressing  Goal: Absence of fever/infection during neutropenic period  Description: INTERVENTIONS:  - Monitor WBC    Outcome: Progressing     Problem: SAFETY ADULT  Goal: Maintain or return to baseline ADL function  Description: INTERVENTIONS:  -  Assess patient's ability to carry out ADLs; assess patient's baseline for ADL function and identify physical deficits which impact ability to perform ADLs (bathing, care of mouth/teeth, toileting, grooming, dressing, etc.)  - Assess/evaluate cause of self-care deficits   - Assess range of motion  - Assess patient's mobility; develop plan if impaired  - Assess patient's need for assistive devices and provide as appropriate  - Encourage maximum independence but intervene and supervise when necessary  - Involve family in performance of ADLs  - Assess for home care needs following discharge   - Consider OT consult to assist with ADL evaluation and planning for discharge  - Provide patient education as appropriate  Outcome: Progressing  Goal: Maintains/Returns to pre admission functional level  Description: INTERVENTIONS:  - Perform AM-PAC 6 Click Basic Mobility/ Daily Activity assessment daily.  - Set and communicate daily mobility goal to care team and patient/family/caregiver.   - Collaborate with rehabilitation services on mobility goals if consulted  - Perform Range of Motion 3 times a day.  - Reposition patient every 2 hours.  - Dangle patient 3 times a day  - Stand patient 3 times a day  - Ambulate patient 3 times a day  - Out of bed to chair 3 times a day   - Out of bed for meals 3 times a day  - Out of bed for toileting  - Record patient progress and toleration of activity level   Outcome: Progressing     Problem: DISCHARGE PLANNING  Goal: Discharge to home or other facility with appropriate resources  Description: INTERVENTIONS:  - Identify barriers  to discharge w/patient and caregiver  - Arrange for needed discharge resources and transportation as appropriate  - Identify discharge learning needs (meds, wound care, etc.)  - Arrange for interpretive services to assist at discharge as needed  - Refer to Case Management Department for coordinating discharge planning if the patient needs post-hospital services based on physician/advanced practitioner order or complex needs related to functional status, cognitive ability, or social support system  Outcome: Progressing     Problem: Knowledge Deficit  Goal: Patient/family/caregiver demonstrates understanding of disease process, treatment plan, medications, and discharge instructions  Description: Complete learning assessment and assess knowledge base.  Interventions:  - Provide teaching at level of understanding  - Provide teaching via preferred learning methods  Outcome: Progressing     Problem: Prexisting or High Potential for Compromised Skin Integrity  Goal: Skin integrity is maintained or improved  Description: INTERVENTIONS:  - Identify patients at risk for skin breakdown  - Assess and monitor skin integrity  - Assess and monitor nutrition and hydration status  - Monitor labs   - Assess for incontinence   - Turn and reposition patient  - Assist with mobility/ambulation  - Relieve pressure over bony prominences  - Avoid friction and shearing  - Provide appropriate hygiene as needed including keeping skin clean and dry  - Evaluate need for skin moisturizer/barrier cream  - Collaborate with interdisciplinary team   - Patient/family teaching  - Consider wound care consult   Outcome: Progressing

## 2025-01-12 NOTE — ASSESSMENT & PLAN NOTE
Continue PTA medications    -Eliquis was on hold since 12/24 secondary to gross hematuria after inserting Sparks's catheter.  -will continue eliquis . And monitor for hematuria recurrence

## 2025-01-13 ENCOUNTER — APPOINTMENT (INPATIENT)
Dept: RADIOLOGY | Facility: HOSPITAL | Age: OVER 89
End: 2025-01-13
Payer: COMMERCIAL

## 2025-01-13 ENCOUNTER — APPOINTMENT (INPATIENT)
Dept: NON INVASIVE DIAGNOSTICS | Facility: HOSPITAL | Age: OVER 89
End: 2025-01-13
Payer: COMMERCIAL

## 2025-01-13 PROBLEM — B96.5 BACTEREMIA DUE TO PSEUDOMONAS: Status: ACTIVE | Noted: 2025-01-12

## 2025-01-13 PROBLEM — N39.0 UTI (URINARY TRACT INFECTION): Status: ACTIVE | Noted: 2025-01-13

## 2025-01-13 LAB
ALBUMIN SERPL BCG-MCNC: 3 G/DL (ref 3.5–5)
ALP SERPL-CCNC: 39 U/L (ref 34–104)
ALT SERPL W P-5'-P-CCNC: 4 U/L (ref 7–52)
ANION GAP SERPL CALCULATED.3IONS-SCNC: 6 MMOL/L (ref 4–13)
AORTIC ROOT: 3.5 CM
AST SERPL W P-5'-P-CCNC: 9 U/L (ref 13–39)
ATRIAL RATE: 87 BPM
AV LVOT PEAK GRADIENT: 3 MMHG
AV PEAK GRADIENT: 9 MMHG
BASOPHILS # BLD AUTO: 0.01 THOUSANDS/ΜL (ref 0–0.1)
BASOPHILS NFR BLD AUTO: 0 % (ref 0–1)
BILIRUB SERPL-MCNC: 0.89 MG/DL (ref 0.2–1)
BSA FOR ECHO PROCEDURE: 2.06 M2
BUN SERPL-MCNC: 12 MG/DL (ref 5–25)
CALCIUM ALBUM COR SERPL-MCNC: 9 MG/DL (ref 8.3–10.1)
CALCIUM SERPL-MCNC: 8.2 MG/DL (ref 8.4–10.2)
CHLORIDE SERPL-SCNC: 110 MMOL/L (ref 96–108)
CO2 SERPL-SCNC: 24 MMOL/L (ref 21–32)
CREAT SERPL-MCNC: 0.76 MG/DL (ref 0.6–1.3)
DOP CALC LVOT AREA: 3.14 CM2
DOP CALC LVOT DIAMETER: 2 CM
E WAVE DECELERATION TIME: 329 MS
E/A RATIO: 0.78
EOSINOPHIL # BLD AUTO: 0.1 THOUSAND/ΜL (ref 0–0.61)
EOSINOPHIL NFR BLD AUTO: 1 % (ref 0–6)
ERYTHROCYTE [DISTWIDTH] IN BLOOD BY AUTOMATED COUNT: 12.4 % (ref 11.6–15.1)
FRACTIONAL SHORTENING: 33 (ref 28–44)
GFR SERPL CREATININE-BSD FRML MDRD: 80 ML/MIN/1.73SQ M
GLUCOSE SERPL-MCNC: 104 MG/DL (ref 65–140)
GLUCOSE SERPL-MCNC: 104 MG/DL (ref 65–140)
HCT VFR BLD AUTO: 36.6 % (ref 36.5–49.3)
HGB BLD-MCNC: 12 G/DL (ref 12–17)
IMM GRANULOCYTES # BLD AUTO: 0.02 THOUSAND/UL (ref 0–0.2)
IMM GRANULOCYTES NFR BLD AUTO: 0 % (ref 0–2)
INTERVENTRICULAR SEPTUM IN DIASTOLE (PARASTERNAL SHORT AXIS VIEW): 1.5 CM
INTERVENTRICULAR SEPTUM: 1.5 CM (ref 0.6–1.1)
LAAS-AP2: 29.9 CM2
LAAS-AP4: 23.7 CM2
LEFT ATRIUM SIZE: 3.8 CM
LEFT ATRIUM VOLUME (MOD BIPLANE): 89 ML
LEFT ATRIUM VOLUME INDEX (MOD BIPLANE): 43.2 ML/M2
LEFT INTERNAL DIMENSION IN SYSTOLE: 3.4 CM (ref 2.1–4)
LEFT VENTRICULAR INTERNAL DIMENSION IN DIASTOLE: 5.1 CM (ref 3.5–6)
LEFT VENTRICULAR POSTERIOR WALL IN END DIASTOLE: 1.3 CM
LEFT VENTRICULAR STROKE VOLUME: 73 ML
LV EF US.2D.A4C+ESTIMATED: 58 %
LVSV (TEICH): 73 ML
LYMPHOCYTES # BLD AUTO: 0.32 THOUSANDS/ΜL (ref 0.6–4.47)
LYMPHOCYTES NFR BLD AUTO: 4 % (ref 14–44)
MAGNESIUM SERPL-MCNC: 1.9 MG/DL (ref 1.9–2.7)
MCH RBC QN AUTO: 29.2 PG (ref 26.8–34.3)
MCHC RBC AUTO-ENTMCNC: 32.8 G/DL (ref 31.4–37.4)
MCV RBC AUTO: 89 FL (ref 82–98)
MONOCYTES # BLD AUTO: 0.5 THOUSAND/ΜL (ref 0.17–1.22)
MONOCYTES NFR BLD AUTO: 6 % (ref 4–12)
MV E'TISSUE VEL-SEP: 6 CM/S
MV PEAK A VEL: 0.88 M/S
MV PEAK E VEL: 69 CM/S
MV STENOSIS PRESSURE HALF TIME: 95 MS
MV VALVE AREA P 1/2 METHOD: 2.32
NEUTROPHILS # BLD AUTO: 7.08 THOUSANDS/ΜL (ref 1.85–7.62)
NEUTS SEG NFR BLD AUTO: 89 % (ref 43–75)
NRBC BLD AUTO-RTO: 0 /100 WBCS
P AXIS: 87 DEGREES
PLATELET # BLD AUTO: 165 THOUSANDS/UL (ref 149–390)
PMV BLD AUTO: 11.6 FL (ref 8.9–12.7)
POTASSIUM SERPL-SCNC: 3.8 MMOL/L (ref 3.5–5.3)
PR INTERVAL: 212 MS
PROCALCITONIN SERPL-MCNC: 2.05 NG/ML
PROT SERPL-MCNC: 5.6 G/DL (ref 6.4–8.4)
QRS AXIS: -62 DEGREES
QRSD INTERVAL: 112 MS
QT INTERVAL: 396 MS
QTC INTERVAL: 476 MS
RA PRESSURE ESTIMATED: 8 MMHG
RBC # BLD AUTO: 4.11 MILLION/UL (ref 3.88–5.62)
RIGHT ATRIUM AREA SYSTOLE A4C: 21.1 CM2
RIGHT VENTRICLE ID DIMENSION: 3.3 CM
RV PSP: 23 MMHG
SL CV LEFT ATRIUM LENGTH A2C: 6.1 CM
SL CV LV EF: 60
SL CV PED ECHO LEFT VENTRICLE DIASTOLIC VOLUME (MOD BIPLANE) 2D: 121 ML
SL CV PED ECHO LEFT VENTRICLE SYSTOLIC VOLUME (MOD BIPLANE) 2D: 48 ML
SODIUM SERPL-SCNC: 140 MMOL/L (ref 135–147)
T WAVE AXIS: 79 DEGREES
TR MAX PG: 15 MMHG
TR PEAK VELOCITY: 1.9 M/S
TRICUSPID ANNULAR PLANE SYSTOLIC EXCURSION: 2.1 CM
TRICUSPID VALVE PEAK REGURGITATION VELOCITY: 1.93 M/S
VENTRICULAR RATE: 87 BPM
WBC # BLD AUTO: 8.03 THOUSAND/UL (ref 4.31–10.16)

## 2025-01-13 PROCEDURE — 99232 SBSQ HOSP IP/OBS MODERATE 35: CPT

## 2025-01-13 PROCEDURE — 80053 COMPREHEN METABOLIC PANEL: CPT

## 2025-01-13 PROCEDURE — 99223 1ST HOSP IP/OBS HIGH 75: CPT | Performed by: STUDENT IN AN ORGANIZED HEALTH CARE EDUCATION/TRAINING PROGRAM

## 2025-01-13 PROCEDURE — 93010 ELECTROCARDIOGRAM REPORT: CPT | Performed by: INTERNAL MEDICINE

## 2025-01-13 PROCEDURE — G0545 PR INHERENT VISIT TO INPT: HCPCS | Performed by: STUDENT IN AN ORGANIZED HEALTH CARE EDUCATION/TRAINING PROGRAM

## 2025-01-13 PROCEDURE — 82948 REAGENT STRIP/BLOOD GLUCOSE: CPT

## 2025-01-13 PROCEDURE — 74177 CT ABD & PELVIS W/CONTRAST: CPT

## 2025-01-13 PROCEDURE — 84145 PROCALCITONIN (PCT): CPT

## 2025-01-13 PROCEDURE — 97163 PT EVAL HIGH COMPLEX 45 MIN: CPT

## 2025-01-13 PROCEDURE — 85025 COMPLETE CBC W/AUTO DIFF WBC: CPT

## 2025-01-13 PROCEDURE — 93306 TTE W/DOPPLER COMPLETE: CPT

## 2025-01-13 PROCEDURE — 93306 TTE W/DOPPLER COMPLETE: CPT | Performed by: INTERNAL MEDICINE

## 2025-01-13 PROCEDURE — 97110 THERAPEUTIC EXERCISES: CPT

## 2025-01-13 PROCEDURE — 87040 BLOOD CULTURE FOR BACTERIA: CPT | Performed by: STUDENT IN AN ORGANIZED HEALTH CARE EDUCATION/TRAINING PROGRAM

## 2025-01-13 PROCEDURE — 83735 ASSAY OF MAGNESIUM: CPT

## 2025-01-13 RX ORDER — CEFEPIME HYDROCHLORIDE 2 G/50ML
2000 INJECTION, SOLUTION INTRAVENOUS EVERY 8 HOURS
Status: DISCONTINUED | OUTPATIENT
Start: 2025-01-13 | End: 2025-01-14

## 2025-01-13 RX ORDER — SENNOSIDES 8.6 MG
1 TABLET ORAL 2 TIMES DAILY
Status: DISCONTINUED | OUTPATIENT
Start: 2025-01-13 | End: 2025-01-17 | Stop reason: HOSPADM

## 2025-01-13 RX ADMIN — SENNOSIDES 8.6 MG: 8.6 TABLET, FILM COATED ORAL at 17:14

## 2025-01-13 RX ADMIN — DOCUSATE SODIUM 100 MG: 100 CAPSULE, LIQUID FILLED ORAL at 12:16

## 2025-01-13 RX ADMIN — CARBIDOPA AND LEVODOPA 1 TABLET: 25; 100 TABLET ORAL at 16:15

## 2025-01-13 RX ADMIN — CEFEPIME HYDROCHLORIDE 2000 MG: 2 INJECTION, SOLUTION INTRAVENOUS at 02:00

## 2025-01-13 RX ADMIN — CEFEPIME HYDROCHLORIDE 2000 MG: 2 INJECTION, SOLUTION INTRAVENOUS at 21:48

## 2025-01-13 RX ADMIN — TAMSULOSIN HYDROCHLORIDE 0.4 MG: 0.4 CAPSULE ORAL at 16:16

## 2025-01-13 RX ADMIN — AMLODIPINE BESYLATE 5 MG: 5 TABLET ORAL at 12:15

## 2025-01-13 RX ADMIN — CHOLECALCIFEROL (VITAMIN D3) 10 MCG (400 UNIT) TABLET 2000 UNITS: at 12:16

## 2025-01-13 RX ADMIN — APIXABAN 5 MG: 5 TABLET, FILM COATED ORAL at 12:18

## 2025-01-13 RX ADMIN — IOHEXOL 100 ML: 350 INJECTION, SOLUTION INTRAVENOUS at 12:00

## 2025-01-13 RX ADMIN — SENNOSIDES 8.6 MG: 8.6 TABLET, FILM COATED ORAL at 12:18

## 2025-01-13 RX ADMIN — CARBIDOPA AND LEVODOPA 1 TABLET: 25; 100 TABLET ORAL at 12:18

## 2025-01-13 RX ADMIN — CEFEPIME HYDROCHLORIDE 2000 MG: 2 INJECTION, SOLUTION INTRAVENOUS at 12:29

## 2025-01-13 RX ADMIN — LISINOPRIL 5 MG: 5 TABLET ORAL at 12:15

## 2025-01-13 RX ADMIN — APIXABAN 5 MG: 5 TABLET, FILM COATED ORAL at 17:14

## 2025-01-13 RX ADMIN — SODIUM CHLORIDE 75 ML/HR: 0.9 INJECTION, SOLUTION INTRAVENOUS at 21:52

## 2025-01-13 RX ADMIN — TICAGRELOR 60 MG: 60 TABLET ORAL at 12:19

## 2025-01-13 RX ADMIN — DOCUSATE SODIUM 100 MG: 100 CAPSULE, LIQUID FILLED ORAL at 17:14

## 2025-01-13 RX ADMIN — LEVOTHYROXINE SODIUM 100 MCG: 100 TABLET ORAL at 06:01

## 2025-01-13 NOTE — ASSESSMENT & PLAN NOTE
1 of 2 sets admission blood cultures is growing gram-negative rods identified as Pseudomonas aeruginosa on BCID.  UA shows pyuria.  Suspect a urinary source although urine culture is growing Chyyseobacterium indologenes.  He did have prior hematuria and underwent Sparks catheter removal and replacement last week so he may have had translocation from the urinary tract at a earlier time.  White blood cell count was improving even prior to initiation of targeted antibiotics, and has now normalized.  The patient is hemodynamically stable.  No intravascular prosthetic devices.   -Continue IV cefepime but increase dose to 2 g every 8 hours   -If mental status worsens will adjust antibiotics, but his lethargy today is unlikely due to the cefepime as he was just started on it yesterday   -Follow-up blood cultures   -Repeat blood cultures   -Check CT A/P with contrast to assess for any abnormalities of the urinary tract   -Check TTE   -Will avoid fluoroquinolones due to patient's iliac artery aneurysm, age   -Patient will need to complete a course of IV antibiotics, duration and drug choice will be dependent on clinical course   -Monitor CBC, CMP to assess for treatment response and drug toxicity

## 2025-01-13 NOTE — ASSESSMENT & PLAN NOTE
In the setting of acute infection.  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   -Monitor mental status   -Will continue cefepime for now but if mental status worsens may need to alter treatment

## 2025-01-13 NOTE — ASSESSMENT & PLAN NOTE
Patient with a history of infrarenal aortic dissection, known iliac artery aneurysm.  Will avoid fluoroquinolones.

## 2025-01-13 NOTE — ASSESSMENT & PLAN NOTE
Noted per family at bedside.   History of hospital delirium per family at bedside. Similar to this time.     Patient is cooperative, answering quesitons   Alert and oriented to person only at this time .  Significant change from yesterday     Probably multifactorial in the setting of hospitalization / deconditioning / active infection / antibiotic use.   No focal deficit identified.     -1/12/2025; CT head; negative.  No acute findings.    -1/13/2025; patient is more alert and oriented currently to person and place.  Still with some confusion however compared to yesterday with remarkable improvement.    -delirium precautions  -minimize lab tests as possible   -arrange medication timings   -Minimize opioids/benzodiazepine use.

## 2025-01-13 NOTE — INCIDENTAL FINDINGS
The following findings require follow up:  Radiographic finding   Findin.5 cm hyperdense lesion arising from the posterior lower pole of the left kidney, is stable and most likely a proteinaceous or hemorrhagic cyst but technically indeterminate.    Follow up required: renal ultrasound   Follow up should be done within 3 month(s)    Please notify the following clinician to assist with the follow up:   Dr. Phil Major    Incidental finding results were discussed with the Patient by Whit Gu MD on 25.   They expressed understanding and all questions answered.

## 2025-01-13 NOTE — ASSESSMENT & PLAN NOTE
POA with generalized weakness and worsening parkinson symptoms the last 3 days  Follows with urology, benign prostate hyperplasia with urinary retention , Balderrama catheter in place in Ed on 12/25/24  Chart review performed: Seen in urology's office 1/7/2025: Urinary retention  for voiding trial failed balderrama replaced  In Ed noted, Leukocytosis WBCs 17.72, lactic acid and Pro-Zi negative, afebrile  Urine culture;>100,000 cfu/ml Chryseobacterium indologenes Abnormal       Continue cefepime 2 g Q8  Infectious disease consulted; appreciate input  Follow-up with urine culture  Balderrama's catheter remains in place.  Consult urology; recommending voiding trial once constipation resolved.  Interval follow-up  Trend fevers, CBC, vitals

## 2025-01-13 NOTE — CONSULTS
Consultation - Infectious Disease   Name: Pavan Edward 89 y.o. male I MRN: [id number]  Unit/Bed#: 3 [address] I Date of Admission: 1/10/2025   Date of Service: 1/13/2025 I Hospital Day: 3   Inpatient consult to Infectious Diseases  Consult performed by: Julia Trevizo MD  Consult ordered by: YESSY Marc        Physician Requesting Evaluation: Whit Gu MD   Reason for Evaluation / Principal Problem: Pseudomonas bacteremia    Assessment & Plan  Bacteremia due to Pseudomonas  1 of 2 sets admission blood cultures is growing gram-negative rods identified as Pseudomonas aeruginosa on BCID.  UA shows pyuria.  Suspect a urinary source although urine culture is growing Chyyseobacterium indologenes.  He did have prior hematuria and underwent Sparks catheter removal and replacement last week so he may have had translocation from the urinary tract at a earlier time.  White blood cell count was improving even prior to initiation of targeted antibiotics, and has now normalized.  The patient is hemodynamically stable.  No intravascular prosthetic devices.   -Continue IV cefepime but increase dose to 2 g every 8 hours   -If mental status worsens will adjust antibiotics, but his lethargy today is unlikely due to the cefepime as he was just started on it yesterday   -Follow-up blood cultures   -Repeat blood cultures   -Check CT A/P with contrast to assess for any abnormalities of the urinary tract   -Check TTE   -Will avoid fluoroquinolones due to patient's iliac artery aneurysm, age   -Patient will need to complete a course of IV antibiotics, duration and drug choice will be dependent on clinical course   -Monitor CBC, CMP to assess for treatment response and drug toxicity  Complicated UTI (urinary tract infection)  In the setting of recent hematuria, Sparks catheter insertion/removal over the past few weeks.  UA with pyuria although difficult to assess in the setting of the Sparks catheter.  His urine culture  is interestingly growing a different organism in the blood; urine is growing Chyyseobacterium indologenes, blood culture is growing Pseudomonas aeruginosa.   -Continue IV cefepime   -Follow-up blood and urine cultures   -Check CT A/P   -Urology following  Acute metabolic encephalopathy  In the setting of acute infection.  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   -Monitor mental status   -Will continue cefepime for now but if mental status worsens may need to alter treatment  Parkinson's disease (HCC)  Continue home medications per primary.  Paroxysmal atrial fibrillation (HCC)  Anticoagulation per primary.  Aortic dissection, abdominal (HCC)  Patient with a history of infrarenal aortic dissection, known iliac artery aneurysm.  Will avoid fluoroquinolones.    Above management plan to continue IV cefepime, check CT imaging discussed with Dr. Gu.  Discussed with the patient's wife at bedside.  ID will follow.    Antibiotics:  Cefepime day 2    History of Present Illness   Pavan Edward is a 89 y.o. man with a history of coronary artery disease status post PCI, atrial fibrillation, Parkinson's disease, CVA, JORGE aneurysm, BPH who was brought to the hospital with 1 day of fatigue and worsening tremors.  The patient was initially seen in the ED 12/25 with urinary retention and imaging showed an enlarged and nodular prostate.  A Sparks catheter was placed.  He was seen in the ED again 12/30 for hematuria which subsequently resolved.  The patient was seen by urology 1/7 for voiding trial but the catheter had to be replaced due to retention.  At the time he was given 5 days of Keflex.  On presentation, white blood cell count was 17.72, UA showed innumerable WBCs and RBCs.  The patient was afebrile and hemodynamically stable.  The patient was started on IV ceftriaxone.  1 of 2 sets admission blood cultures is now growing gram-negative rods identified as Pseudomonas aeruginosa on  BCID so antibiotics were switched to IV cefepime 1/12.  Urine cultures growing over 100 K. Chryseobacterium indologenes.  ID is consulted for further antibiotic management.  The patient is very lethargic today and did not want to open his eyes but he did answer yes or no to some questions.  He denies any fevers, abdominal pain.  Per the patient's wife, the lethargy is worse today and at home is usually very active and goes to the gym.    A complete review of systems is negative other than that noted in the HPI.    I have reviewed the patient's PMH, PSH, Social History, Family History, Meds, and Allergies    Objective :  Temp:  [97.9 °F (36.6 °C)-98.8 °F (37.1 °C)] 98.8 °F (37.1 °C)  HR:  [56-70] 56  BP: (141-187)/(71-89) 187/80  Resp:  [16-19] 19  SpO2:  [95 %-96 %] 95 %  O2 Device: None (Room air)    General: Lethargic  Psychiatric: Arousable but lethargic  Pulmonary:  Normal respiratory excursion without accessory muscle use  Abdomen:  Soft, nontender  : Sparks catheter in place with yellow urine  Extremities:  No edema  Skin:  No rashes  Neuro: Upper extremity tremor      Lab Results: I have reviewed the following results:  Results from last 7 days   Lab Units 01/13/25  0604 01/12/25  0714 01/11/25  0613   WBC Thousand/uL 8.03 13.14* 17.62*   HEMOGLOBIN g/dL 12.0 11.9* 11.4*   PLATELETS Thousands/uL 165 151 171     Results from last 7 days   Lab Units 01/13/25  0604 01/12/25  0714 01/11/25  0613   SODIUM mmol/L 140 139 139   POTASSIUM mmol/L 3.8 4.4 4.6   CHLORIDE mmol/L 110* 108 108   CO2 mmol/L 24 25 26   BUN mg/dL 12 19 26*   CREATININE mg/dL 0.76 0.84 1.07   EGFR ml/min/1.73sq m 80 77 61   CALCIUM mg/dL 8.2* 7.6* 7.5*   AST U/L 9* 8* 8*   ALT U/L 4* 4* 3*   ALK PHOS U/L 39 40 39   ALBUMIN g/dL 3.0* 2.9* 3.0*     Results from last 7 days   Lab Units 01/10/25  2025 01/10/25  1944   BLOOD CULTURE  No Growth at 48 hrs.  --    GRAM STAIN RESULT  Gram negative rods*  --    URINE CULTURE   --  >100,000 cfu/ml  Chryseobacterium indologenes*     Results from last 7 days   Lab Units 01/13/25  0604   PROCALCITONIN ng/ml 2.05*     Imaging: Chest x-ray personally reviewed, no edema or consolidation

## 2025-01-13 NOTE — PROGRESS NOTES
Progress Note - Hospitalist   Name: Pavan Edward 89 y.o. male I MRN: [id number]  Unit/Bed#: 3 [address] I Date of Admission: 1/10/2025   Date of Service: 1/13/2025 I Hospital Day: 3    Assessment & Plan  Complicated UTI (urinary tract infection)  POA with generalized weakness and worsening parkinson symptoms the last 3 days  Follows with urology, benign prostate hyperplasia with urinary retention , Balderrama catheter in place in Ed on 12/25/24  Chart review performed: Seen in urology's office 1/7/2025: Urinary retention  for voiding trial failed balderrama replaced  In Ed noted, Leukocytosis WBCs 17.72, lactic acid and Pro-Zi negative, afebrile  Urine culture;>100,000 cfu/ml Chryseobacterium indologenes Abnormal       Continue cefepime 2 g Q8  Infectious disease consulted; appreciate input  Follow-up with urine culture  Balderrama's catheter remains in place.  Consult urology; recommending voiding trial once constipation resolved.  Interval follow-up  Trend fevers, CBC, vitals  CAD (coronary artery disease)  Follow with cardiology  Denies chest pain or palpations   S/p PCI x2 on 8/13/2023  Brilinta was on hold on 12/24 per ED given gross hematuria.  However it was traumatic secondary to Balderrama's catheter insertion.  Currently hematuria cleared per bedside exam and patient.  Brillinta continued during hospitalization      Hypertension  Home medication lisinopril continued   Paroxysmal atrial fibrillation (HCC)  Continue PTA medications    -Eliquis was on hold since 12/24 secondary to gross hematuria after inserting Balderrama's catheter.  -will continue eliquis . And monitor for hematuria recurrence   Parkinson's disease (HCC)  Chronic   Home medication carbidopa-levodopa continued  PT consulted   Pain in both lower extremities  POA with bilateral leg pain behind his knees and swelling  Per wife patient Eliquis and ticagrelor have been on hold since he developed hematuria   Obtain venous study r/oDVT  PRN Tylenol for pain  Elevated  lower extremities   PAD (peripheral artery disease) ([facility])  Followed by vascular surgery  Home medicaiton  Brilinta 60 mg every 12 hours      Gross hematuria  Per patient and significant other at bedside he developed gross hematuria after inserting Sparks's catheter on 12/24/2024 he was advised to stop Eliquis and Brilinta given hematuria by emergency medicine provider.  Family reports gross hematuria resolved several days ago.  Brilinta was restarted yesterday per family.    -Currently clear urine noted at bedside in Sparks's catheter bag.  -Restart Eliquis  -Continue Brilinta  -Continue to monitor    -remains with yellow colored urine. No hematuria . Continue to monitor   Gram-negative bacteremia  Pseudomonas aeruginosa + blood culture     -mostly of urine origin given current acute cystitis    -started on cefepime 2 g Q8   -consult infectious disease team    Acute metabolic encephalopathy  Noted per family at bedside.   History of hospital delirium per family at bedside. Similar to this time.     Patient is cooperative, answering quesitons   Alert and oriented to person only at this time .  Significant change from yesterday     Probably multifactorial in the setting of hospitalization / deconditioning / active infection / antibiotic use.   No focal deficit identified.     -1/12/2025; CT head; negative.  No acute findings.    -1/13/2025; patient is more alert and oriented currently to person and place.  Still with some confusion however compared to yesterday with remarkable improvement.    -delirium precautions  -minimize lab tests as possible   -arrange medication timings   -Minimize opioids/benzodiazepine use.      VTE Pharmacologic Prophylaxis:   Moderate Risk (Score 3-4) - Pharmacological DVT Prophylaxis Ordered: apixaban (Eliquis).    Mobility:   Basic Mobility Inpatient Raw Score: 9  -[facility] Goal: 3: Sit at edge of bed  -HLM Achieved: 2: Bed activities/Dependent transfer  -HL Goal achieved. Continue to  encourage appropriate mobility.    Patient Centered Rounds: I performed bedside rounds with nursing staff today.   Discussions with Specialists or Other Care Team Provider: Infectious disease, urology    Education and Discussions with Family / Patient: Updated  (significant other) via phone.    Current Length of Stay: 3 day(s)  Current Patient Status: Inpatient   Certification Statement: The patient will continue to require additional inpatient hospital stay due to acute metabolic encephalopathy, gram-negative bacteremia, urinary tract infection, constipation  Discharge Plan: Anticipate discharge in 24-48 hrs to home.    Code Status: Level 3 - DNAR and DNI    Subjective   Patient seen today at bedside.  Does not have any active complaints.  He is cooperative.  Pleasant.  Answering questions appropriately.  Denies any nausea or vomiting, chest pain or tightness or shortness of breath.  No abdominal pain or discomfort.  He is not sure if he had a bowel movement or not.  Per nursing no signout available.     Patient is alert and oriented to person and place with limitations.  Compared to previous day his alertness orientation is improving.     Objective :  Temp:  [97.9 °F (36.6 °C)-98.4 °F (36.9 °C)] 98.2 °F (36.8 °C)  HR:  [67-70] 67  BP: (141-182)/(71-89) 179/89  Resp:  [16-18] 18  SpO2:  [95 %-96 %] 96 %  O2 Device: None (Room air)    Body mass index is 23.75 kg/m².     Input and Output Summary (last 24 hours):     Intake/Output Summary (Last 24 hours) at 1/13/2025 0934  Last data filed at 1/13/2025 0913  Gross per 24 hour   Intake --   Output 3050 ml   Net -3050 ml       Physical Exam  Vitals and nursing note reviewed.   Constitutional:       General: He is not in acute distress.     Appearance: Normal appearance.   HENT:      Head: Normocephalic and atraumatic.      Mouth/Throat:      Mouth: Mucous membranes are moist.   Eyes:      Conjunctiva/sclera: Conjunctivae normal.      Pupils: Pupils are  equal, round, and reactive to light.   Cardiovascular:      Rate and Rhythm: Normal rate and regular rhythm.      Pulses: Normal pulses.           Carotid pulses are 2+ on the right side and 2+ on the left side.       Radial pulses are 2+ on the right side and 2+ on the left side.        Dorsalis pedis pulses are 2+ on the right side and 2+ on the left side.      Heart sounds: Normal heart sounds, S1 normal and S2 normal. No murmur heard.  Pulmonary:      Effort: No tachypnea, bradypnea or accessory muscle usage.      Breath sounds: Normal breath sounds and air entry. No decreased breath sounds, wheezing, rhonchi or rales.   Abdominal:      General: Abdomen is flat. Bowel sounds are normal. There is no distension.      Palpations: Abdomen is soft.      Tenderness: There is no abdominal tenderness.   Musculoskeletal:      Right lower leg: No edema.      Left lower leg: No edema.   Neurological:      General: No focal deficit present.      Mental Status: He is alert. He is confused.      GCS: GCS eye subscore is 4. GCS verbal subscore is 5. GCS motor subscore is 6.      Cranial Nerves: Cranial nerves 2-12 are intact.      Motor: Motor function is intact.   Psychiatric:         Mood and Affect: Mood normal.         Behavior: Behavior normal.           Lines/Drains:  Lines/Drains/Airways       Active Status       Name Placement date Placement time Site Days    Urethral Catheter Latex 16 Fr. 12/25/24  0643  Latex  19                  Urinary Catheter:  Goal for removal: Voiding trial when ambulation improves                 Lab Results: I have reviewed the following results:   Results from last 7 days   Lab Units 01/13/25  0604   WBC Thousand/uL 8.03   HEMOGLOBIN g/dL 12.0   HEMATOCRIT % 36.6   PLATELETS Thousands/uL 165   SEGS PCT % 89*   LYMPHO PCT % 4*   MONO PCT % 6   EOS PCT % 1     Results from last 7 days   Lab Units 01/13/25  0604   SODIUM mmol/L 140   POTASSIUM mmol/L 3.8   CHLORIDE mmol/L 110*   CO2 mmol/L 24    BUN mg/dL 12   CREATININE mg/dL 0.76   ANION GAP mmol/L 6   CALCIUM mg/dL 8.2*   ALBUMIN g/dL 3.0*   TOTAL BILIRUBIN mg/dL 0.89   ALK PHOS U/L 39   ALT U/L 4*   AST U/L 9*   GLUCOSE RANDOM mg/dL 104         Results from last 7 days   Lab Units 01/13/25  0712 01/10/25  1909   POC GLUCOSE mg/dl 104 131         Results from last 7 days   Lab Units 01/13/25  0604 01/10/25  2210   LACTIC ACID mmol/L  --  0.6   PROCALCITONIN ng/ml 2.05*  --        Recent Cultures (last 7 days):   Results from last 7 days   Lab Units 01/10/25  2025 01/10/25  1944   BLOOD CULTURE  No Growth at 48 hrs.  --    GRAM STAIN RESULT  Gram negative rods*  --    URINE CULTURE   --  >100,000 cfu/ml Chryseobacterium indologenes*       Imaging Results Review: No pertinent imaging studies reviewed.  Other Study Results Review: No additional pertinent studies reviewed.    Last 24 Hours Medication List:     Current Facility-Administered Medications:     acetaminophen (TYLENOL) tablet 650 mg, Q6H PRN    amLODIPine (NORVASC) tablet 5 mg, Daily    apixaban (ELIQUIS) tablet 5 mg, BID    carbidopa-levodopa (SINEMET)  mg per tablet 1 tablet, TID    cefepime (MAXIPIME) IVPB (premix in dextrose) 2,000 mg 50 mL, Q8H    Cholecalciferol (VITAMIN D3) tablet 2,000 Units, Daily    docusate sodium (COLACE) capsule 100 mg, BID    gabapentin (NEURONTIN) capsule 100 mg, HS    levothyroxine tablet 100 mcg, Early Morning    lisinopril (ZESTRIL) tablet 5 mg, Daily    ondansetron (ZOFRAN) injection 4 mg, Q6H PRN    polyethylene glycol (MIRALAX) packet 17 g, Daily PRN    polyethylene glycol (MIRALAX) packet 17 g, HS    senna (SENOKOT) tablet 8.6 mg, BID    sodium chloride 0.9 % infusion, Continuous, Last Rate: 75 mL/hr (01/10/25 2312)    tamsulosin (FLOMAX) capsule 0.4 mg, Daily With Dinner    ticagrelor tablet 60 mg, Q12H SAM    Administrative Statements   Today, Patient Was Seen By: Whit Gu MD  I have spent a total time of 30 minutes in caring for this patient  on the day of the visit/encounter including Diagnostic results, Prognosis, Risks and benefits of tx options, and Instructions for management.    **Please Note: This note may have been constructed using a voice recognition system.**

## 2025-01-13 NOTE — PLAN OF CARE
Problem: Potential for Falls  Goal: Patient will remain free of falls  Description: INTERVENTIONS:  - Educate patient/family on patient safety including physical limitations  - Instruct patient to call for assistance with activity   - Consult OT/PT to assist with strengthening/mobility   - Keep Call bell within reach  - Keep bed low and locked with side rails adjusted as appropriate  - Keep care items and personal belongings within reach  - Initiate and maintain comfort rounds  - Make Fall Risk Sign visible to staff  - Offer Toileting every 2 Hours, in advance of need  - Initiate/Maintain bed alarm  - Obtain necessary fall risk management equipment: walker  - Apply yellow socks and bracelet for high fall risk patients  - Consider moving patient to room near nurses station  Outcome: Progressing     Problem: PAIN - ADULT  Goal: Verbalizes/displays adequate comfort level or baseline comfort level  Description: Interventions:  - Encourage patient to monitor pain and request assistance  - Assess pain using appropriate pain scale  - Administer analgesics based on type and severity of pain and evaluate response  - Implement non-pharmacological measures as appropriate and evaluate response  - Consider cultural and social influences on pain and pain management  - Notify physician/advanced practitioner if interventions unsuccessful or patient reports new pain  Outcome: Progressing     Problem: INFECTION - ADULT  Goal: Absence or prevention of progression during hospitalization  Description: INTERVENTIONS:  - Assess and monitor for signs and symptoms of infection  - Monitor lab/diagnostic results  - Monitor all insertion sites, i.e. indwelling lines, tubes, and drains  - Monitor endotracheal if appropriate and nasal secretions for changes in amount and color  - [place] appropriate cooling/warming therapies per order  - Administer medications as ordered  - Instruct and encourage patient and family to use good hand hygiene  technique  - Identify and instruct in appropriate isolation precautions for identified infection/condition  Outcome: Progressing  Goal: Absence of fever/infection during neutropenic period  Description: INTERVENTIONS:  - Monitor WBC    Outcome: Progressing     Problem: SAFETY ADULT  Goal: Maintain or return to baseline ADL function  Description: INTERVENTIONS:  -  Assess patient's ability to carry out ADLs; assess patient's baseline for ADL function and identify physical deficits which impact ability to perform ADLs (bathing, care of mouth/teeth, toileting, grooming, dressing, etc.)  - Assess/evaluate cause of self-care deficits   - Assess range of motion  - Assess patient's mobility; develop plan if impaired  - Assess patient's need for assistive devices and provide as appropriate  - Encourage maximum independence but intervene and supervise when necessary  - Involve family in performance of ADLs  - Assess for home care needs following discharge   - Consider OT consult to assist with ADL evaluation and planning for discharge  - Provide patient education as appropriate  Outcome: Progressing  Goal: Maintains/Returns to pre admission functional level  Description: INTERVENTIONS:  - Perform AM-PAC 6 Click Basic Mobility/ Daily Activity assessment daily.  - Set and communicate daily mobility goal to care team and patient/family/caregiver.   - Collaborate with rehabilitation services on mobility goals if consulted  - Perform Range of Motion 3 times a day.  - Reposition patient every 2 hours.  - Dangle patient 3 times a day  - Stand patient 3 times a day  - Ambulate patient 3 times a day  - Out of bed to chair 3 times a day   - Out of bed for meals 3 times a day  - Out of bed for toileting  - Record patient progress and toleration of activity level   Outcome: Progressing     Problem: DISCHARGE PLANNING  Goal: Discharge to home or other facility with appropriate resources  Description: INTERVENTIONS:  - Identify barriers  to discharge w/patient and caregiver  - Arrange for needed discharge resources and transportation as appropriate  - Identify discharge learning needs (meds, wound care, etc.)  - Arrange for interpretive services to assist at discharge as needed  - Refer to Case Management Department for coordinating discharge planning if the patient needs post-hospital services based on physician/advanced practitioner order or complex needs related to functional status, cognitive ability, or social support system  Outcome: Progressing     Problem: Knowledge Deficit  Goal: Patient/family/caregiver demonstrates understanding of disease process, treatment plan, medications, and discharge instructions  Description: Complete learning assessment and assess knowledge base.  Interventions:  - Provide teaching at level of understanding  - Provide teaching via preferred learning methods  Outcome: Progressing     Problem: Prexisting or High Potential for Compromised Skin Integrity  Goal: Skin integrity is maintained or improved  Description: INTERVENTIONS:  - Identify patients at risk for skin breakdown  - Assess and monitor skin integrity  - Assess and monitor nutrition and hydration status  - Monitor labs   - Assess for incontinence   - Turn and reposition patient  - Assist with mobility/ambulation  - Relieve pressure over bony prominences  - Avoid friction and shearing  - Provide appropriate hygiene as needed including keeping skin clean and dry  - Evaluate need for skin moisturizer/barrier cream  - Collaborate with interdisciplinary team   - Patient/family teaching  - Consider wound care consult   Outcome: Progressing     Problem: Nutrition/Hydration-ADULT  Goal: Nutrient/Hydration intake appropriate for improving, restoring or maintaining nutritional needs  Description: Monitor and assess patient's nutrition/hydration status for malnutrition. Collaborate with interdisciplinary team and initiate plan and interventions as ordered.  Monitor  patient's weight and dietary intake as ordered or per policy. Utilize nutrition screening tool and intervene as necessary. Determine patient's food preferences and provide high-protein, high-caloric foods as appropriate.     INTERVENTIONS:  - Monitor oral intake, urinary output, labs, and treatment plans  - Assess nutrition and hydration status and recommend course of action  - Evaluate amount of meals eaten  - Assist patient with eating if necessary   - Allow adequate time for meals  - Recommend/ encourage appropriate diets, oral nutritional supplements, and vitamin/mineral supplements  - Order, calculate, and assess calorie counts as needed  - Recommend, monitor, and adjust tube feedings and TPN/PPN based on assessed needs  - Assess need for intravenous fluids  - Provide specific nutrition/hydration education as appropriate  - Include patient/family/caregiver in decisions related to nutrition  Outcome: Progressing

## 2025-01-13 NOTE — CASE MANAGEMENT
Case Management Assessment & Discharge Planning Note    Patient name Pavan Edward  Location [address] [place] [address]/[address] [place] [address]-* MRN [id number]  : 1935 Date 2025       Current Admission Date: 1/10/2025  Current Admission Diagnosis:Complicated UTI (urinary tract infection)   Patient Active Problem List    Diagnosis Date Noted Date Diagnosed    Bacteremia due to Pseudomonas 2025     Acute metabolic encephalopathy 2025     Complicated UTI (urinary tract infection) 2025     Pain in both lower extremities 2025     PAD (peripheral artery disease) ([facility]) 2025     Gross hematuria 2025     Urinary retention 2025     Aortic dissection, abdominal ([facility]) 2024     Other constipation 2024     Thrombocytopenia ([facility]) 2024     B12 deficiency 2024     Positive D dimer 2024     Paroxysmal atrial fibrillation ([facility]) 2024     Hypothyroidism 2024     CAD (coronary artery disease) 2024     Hypertension 2024     COVID 2024     Weakness 2024     Ambulatory dysfunction 2024     Aneurysm of common iliac artery ([facility]) 2024     Renal cyst, left 2024     Parkinson's disease ([facility]) 2024       LOS (days): 3  Geometric Mean LOS (GMLOS) (days): 3.8  Days to GMLOS:1.2     OBJECTIVE:    Risk of Unplanned Readmission Score: 17.37         Current admission status: Inpatient       Preferred Pharmacy:   [facility] [address] [place] [address] - [facility] [address]  [address]  [facility] [address]  Phone: [phone] Fax: [phone]    Primary Care Provider: Phil Major    Primary Insurance: [facility] REP  Secondary Insurance:     ASSESSMENT:  Active Health Care Proxies    There are no active Health Care Proxies on file.    Readmission Root Cause  30 Day Readmission: No    Patient Information  Admitted from:: Home  Mental Status: Confused  Assessment information provided by:: Spouse  Primary Caregiver:  Spouse  Caregiver's Name:: Billigene Wood (spouse)  Caregiver's Relationship to Patient:: Significant Other  Caregiver's Telephone Number:: [phone]  Support Systems: Spouse/significant other, Son  County of Residence: [place]  What city do you live in?: [place], [place]  Home entry access options. Select all that apply.: No steps to enter home  Type of Current Residence: Apartment  Floor Level: 1  Living Arrangements: Lives w/ Spouse/significant other    Activities of Daily Living Prior to Admission  Functional Status: Independent  Completes ADLs independently?: No  Level of ADL dependence: Assistance  Does patient currently own DME?: Yes  What DME does the patient currently own?: Walker, Rollator  Does the patient have a history of Short-Term Rehab?: Yes ([facility])  Does patient have a history of HHC?: Yes ([facility])     Patient Information Continued  Income Source: Pension/FPC  Does patient have prescription coverage?: Yes  Does patient receive dialysis treatments?: No     Means of Transportation  Means of Transport to Appts:: Family transport    DISCHARGE DETAILS:    Discharge planning discussed with:: Spouse Billigene  Freedom of Choice: Yes  Comments - Freedom of Choice: Call made to spouse to introduce role and discuss discharge plan. SW reviewed recommendation by therapy for STR. Spouse verbalized agreement with recommendation and advised SW that [facility] is choice facility if available.  CM contacted family/caregiver?: Yes  Were Treatment Team discharge recommendations reviewed with patient/caregiver?: Yes  Did patient/caregiver verbalize understanding of patient care needs?: N/A- going to facility  Were patient/caregiver advised of the risks associated with not following Treatment Team discharge recommendations?: Yes    Contacts  Patient Contacts: Billigene Wood (spouse)  Relationship to Patient:: Family  Contact Method: Phone  Phone Number:  [phone]  Reason/Outcome: Continuity of Care, Emergency Contact, Discharge Planning    Other Referral/Resources/Interventions Provided:  Interventions: Short Term Rehab  Referral Comments: STR referral sent to [facility] via [facility]. Response pending.     Treatment Team Recommendation: Short Term Rehab  Discharge Destination Plan:: Short Term Rehab

## 2025-01-13 NOTE — PHYSICAL THERAPY NOTE
PHYSICAL THERAPY EVALUATION/TREATMENT     01/13/25 1115   PT Last Visit   PT Visit Date 01/13/25   Note Type   Note type Evaluation   Pain Assessment   Pain Assessment Tool Levin-Baker FACES   Levin-Baker FACES Pain Rating 0   Restrictions/Precautions   Other Precautions Cognitive;Chair Alarm;Bed Alarm;Fall Risk   Home Living   Type of Home Apartment   Home Layout One level;Elevator   Bathroom Equipment Shower chair;Commode   Home Equipment Walker  (Rollator and roller walker)   Additional Comments Patient is a poor historian at this time although wife present and states patient has been using a walker or cane off and on most recently   Prior Function   Level of [place] Independent with functional mobility;Needs assistance with ADLs;Needs assistance with IADLS   Lives With Spouse   Receives Help From Family   Falls in the last 6 months 1 to 4   Comments Wife present states that patient in good weather walks with her to the gym at their apartment complex   General   Additional Pertinent History Chart reviewed, patient admitted with complicated UTI.  Patient now somewhat lethargic although arousable with activity.  Patient with confusion and extended history of Parkinson's therefore resulting gait dysfunction.   Family/Caregiver Present Yes  (Wife present)   Cognition   Overall Cognitive Status Impaired   Arousal/Participation Cooperative   Orientation Level Oriented to person;Oriented to place   Following Commands Follows one step commands with increased time or repetition   Comments Patient with eyes closed through majority of session although able to answer yes/no questions and follow simple commands   Subjective   Subjective Patient states no pain   RLE Assessment   RLE Assessment   (Range of motion within functional limits, strength 3+/5)   LLE Assessment   LLE Assessment   (Range of motion within functional limits, strength 3+/5)   Coordination   Movements are Fluid and Coordinated 0   Coordination and  Movement Description Decreased coordination with transfer and gait activity   Bed Mobility   Supine to Sit 2  Maximal assistance   Additional items Assist x 1   Sit to Supine 2  Maximal assistance   Additional items Assist x 1   Transfers   Sit to Stand 3  Moderate assistance   Additional items Assist x 1;Verbal cues   Stand to Sit 3  Moderate assistance   Additional items Assist x 1;Verbal cues   Ambulation/Elevation   Gait Assistance 2  Maximal assist   Additional items Assist x 1;Verbal cues;Tactile cues   Assistive Device Rolling walker   Distance 10 steps at bedside with verbal cueing and weight shifting to advance lower extremities at times.  Patient with forward flexed posturing and eyes closed at times   Balance   Static Sitting Fair -   Dynamic Sitting Poor +   Static Standing Fair -   Dynamic Standing Poor +   Ambulatory Poor +   Activity Tolerance   Activity Tolerance Patient limited by fatigue;Treatment limited secondary to agitation;Treatment limited secondary to medical complications (Comment)  (Limited by cognition)   Nurse Made Aware Yes   Assessment   Prognosis Good   Problem List Decreased strength;Decreased range of motion;Decreased endurance;Impaired balance;Decreased mobility;Decreased coordination;Decreased cognition;Impaired judgement;Decreased safety awareness   Assessment Patient seen for Physical Therapy evaluation. Patient admitted with Complicated UTI (urinary tract infection).  Comorbidities affecting patient's physical performance include: .  Personal factors affecting patient at time of initial evaluation include: ambulating with assistive device, inability to ambulate household distances, inability to navigate community distances, inability to navigate level surfaces without external assistance, inability to perform dynamic tasks in community, decreased cognition, limited home support, positive fall history, inability to perform physical activity, limited insight into impairments,  inability to perform ADLS, and inability to perform IADLS . Prior to admission, patient was independent with functional mobility with walker or cane , requiring assist for ADLS, requiring assist for IADLS, ambulating household distance, ambulating community distances, and home with family assist.  Please find objective findings from Physical Therapy assessment regarding body systems outlined above with impairments and limitations including weakness, decreased ROM, impaired balance, decreased endurance, impaired coordination, gait deviations, pain, decreased activity tolerance, decreased functional mobility tolerance, decreased safety awareness, impaired judgement, fall risk, and decreased cognition.  The Barthel Index was used as a functional outcome tool presenting with a score of Barthel Index Score: 20 today indicating marked limitations of functional mobility and ADLS.  Patient's clinical presentation is currently unstable/unpredictable as seen in patient's presentation of vital sign response, changing level of pain, varying levels of cognitive performance, increased fall risk, new onset of impairment of functional mobility, decreased endurance, and new onset of weakness. Pt would benefit from continued Physical Therapy treatment to address deficits as defined above and maximize level of functional mobility. As demonstrated by objective findings, the assigned level of complexity for this evaluation is high.The patient's -[facility] Basic Mobility Inpatient Short Form Raw Score is 11. A Raw score of less than or equal to 16 suggests the patient may benefit from discharge to post-acute rehabilitation services. Please also refer to the recommendation of the Physical Therapist for safe discharge planning.   Goals   Patient Goals None stated at this time patient is confused   STG Expiration Date 01/20/25   Short Term Goal #1 Bed mobility with min assist   Short Term Goal #2 Transfers and gait with roller walker with min  assist, strength bilateral lower extremities 4 -/5   LTG Expiration Date 01/27/25   Long Term Goal #1 Independent transfers and gait with roller walker for functional household distances   Long Term Goal #2 Strength bilateral lower extremities 4/5   Plan   Treatment/Interventions ADL retraining;Functional transfer training;LE strengthening/ROM;Therapeutic exercise;Endurance training;Cognitive reorientation;Patient/family training;Equipment eval/education;Bed mobility;Gait training;Compensatory technique education   PT Frequency 3-5x/wk   Discharge Recommendation   Rehab Resource Intensity Level, PT II (Moderate Resource Intensity)   AM-PAC Basic Mobility Inpatient   Turning in Flat Bed Without Bedrails 2   Lying on Back to Sitting on Edge of Flat Bed Without Bedrails 2   Moving Bed to Chair 2   Standing Up From Chair Using Arms 2   Walk in Room 2   Climb 3-5 Stairs With Railing 1   Basic Mobility Inpatient Raw Score 11   Basic Mobility Standardized Score 30.25   [facility] Highest Level Of Mobility   -[facility] Goal 4: Move to chair/commode   -[facility] Achieved 6: Walk 10 steps or more   Barthel Index   Feeding 5   Bathing 0   Grooming Score 0   Dressing Score 0   Bladder Score 0   Bowels Score 5   Toilet Use Score 5   Transfers (Bed/Chair) Score 5   Mobility (Level Surface) Score 0   Stairs Score 0   Barthel Index Score 20   Additional Treatment Session   Start Time 1100   End Time 1115   Treatment Assessment s: No pain reported O: Bilateral lower extremity exercise completed as listed below A: Patient with decreased alertness this session and will benefit from continued physical therapy with progression as tolerated and increasing functional mobility with clinical staff as well   Exercises   Hamstring Stretch Sitting;5 reps;PROM;Bilateral   Heelslides Supine;5 reps;AAROM;Bilateral   Hip Flexion Sitting;5 reps;Bilateral  (Alternating)   Knee AROM Long Arc Quad Sitting;5 reps;Bilateral  (Alternating)   Balance training   Sidestepping completed for balance and coordination   Licensure   [place] License Number  Lo Carson PT [id number]

## 2025-01-13 NOTE — PROGRESS NOTES
"Progress Note - Urology      Patient: Pavan Edward   : 1935 Sex: male   MRN: [id number]     CSN: [id number]  Unit/Bed#: [address] [place] [address]     SUBJECTIVE:   Pt seenrounds  No issue balderrama  Discussed possibly deseeding catheter after constipation has cleared stating that he has no issues with the catheter at least at this moment he prefer to continue with it since he is not ambulating out of bed      Objective   Vitals: BP (!) 187/80   Pulse 56   Temp 98.8 °F (37.1 °C) (Temporal)   Resp 19   Ht 6' 1\" (1.854 m)   Wt 81.6 kg (180 lb)   SpO2 95%   BMI 23.75 kg/m²     I/O last 24 hours:  In: 120 [P.O.:120]  Out: 3900 [Urine:3900]      Physical Exam:   General Alert awake   Normocephalic atraumatic PERRLA  Lungs clear bilaterally  Cardiac normal S1 normal S2  Abdomen soft, flank pain  Balderrama draining clear urine  Extremities no edema      Lab Results: CBC:   Lab Results   Component Value Date    WBC 8.03 2025    HGB 12.0 2025    HCT 36.6 2025    MCV 89 2025     2025    RBC 4.11 2025    MCH 29.2 2025    MCHC 32.8 2025    RDW 12.4 2025    MPV 11.6 2025    NRBC 0 2025     CMP:   Lab Results   Component Value Date     (H) 2025    CO2 24 2025    BUN 12 2025    CREATININE 0.76 2025    CALCIUM 8.2 (L) 2025    AST 9 (L) 2025    ALT 4 (L) 2025    ALKPHOS 39 2025    EGFR 80 2025     Urinalysis:   Lab Results   Component Value Date    COLORU Yellow 01/10/2025    CLARITYU Clear 01/10/2025    SPECGRAV 1.025 01/10/2025    PHUR 6.5 01/10/2025    LEUKOCYTESUR Large (A) 01/10/2025    NITRITE Positive (A) 01/10/2025    GLUCOSEU Negative 01/10/2025    KETONESU Negative 01/10/2025    BILIRUBINUR Negative 01/10/2025    BLOODU Large (A) 01/10/2025     Urine Culture:   Lab Results   Component Value Date    URINECX >100,000 cfu/ml Chryseobacterium indologenes (A) 01/10/2025     PSA: No results found for: " "\"PSA\"      Assessment/ Plan:  Urinary retention  [place] trauma  Complicated uti  Awaiting cultures  Continue catheter as per patient wishes would need to see [facility]'s urology for follow-up for another voiding trial            Oliver Cherry MD  "

## 2025-01-13 NOTE — PLAN OF CARE
Problem: Potential for Falls  Goal: Patient will remain free of falls  Description: INTERVENTIONS:  - Educate patient/family on patient safety including physical limitations  - Instruct patient to call for assistance with activity   - Consult OT/PT to assist with strengthening/mobility   - Keep Call bell within reach  - Keep bed low and locked with side rails adjusted as appropriate  - Keep care items and personal belongings within reach  - Initiate and maintain comfort rounds  - Make Fall Risk Sign visible to staff  - Offer Toileting every 2 Hours, in advance of need  - Initiate/Maintain bed alarm  - Apply yellow socks and bracelet for high fall risk patients  - Consider moving patient to room near nurses station  Outcome: Progressing     Problem: PAIN - ADULT  Goal: Verbalizes/displays adequate comfort level or baseline comfort level  Description: Interventions:  - Encourage patient to monitor pain and request assistance  - Assess pain using appropriate pain scale  - Administer analgesics based on type and severity of pain and evaluate response  - Implement non-pharmacological measures as appropriate and evaluate response  - Consider cultural and social influences on pain and pain management  - Notify physician/advanced practitioner if interventions unsuccessful or patient reports new pain  Outcome: Progressing     Problem: INFECTION - ADULT  Goal: Absence or prevention of progression during hospitalization  Description: INTERVENTIONS:  - Assess and monitor for signs and symptoms of infection  - Monitor lab/diagnostic results  - Monitor all insertion sites, i.e. indwelling lines, tubes, and drains  - Monitor endotracheal if appropriate and nasal secretions for changes in amount and color  - [place] appropriate cooling/warming therapies per order  - Administer medications as ordered  - Instruct and encourage patient and family to use good hand hygiene technique  - Identify and instruct in appropriate isolation  precautions for identified infection/condition  Outcome: Progressing     Problem: SAFETY ADULT  Goal: Maintain or return to baseline ADL function  Description: INTERVENTIONS:  -  Assess patient's ability to carry out ADLs; assess patient's baseline for ADL function and identify physical deficits which impact ability to perform ADLs (bathing, care of mouth/teeth, toileting, grooming, dressing, etc.)  - Assess/evaluate cause of self-care deficits   - Assess range of motion  - Assess patient's mobility; develop plan if impaired  - Assess patient's need for assistive devices and provide as appropriate  - Encourage maximum independence but intervene and supervise when necessary  - Involve family in performance of ADLs  - Assess for home care needs following discharge   - Consider OT consult to assist with ADL evaluation and planning for discharge  - Provide patient education as appropriate  Outcome: Progressing  Goal: Maintains/Returns to pre admission functional level  Description: INTERVENTIONS:  - Perform AM-PAC 6 Click Basic Mobility/ Daily Activity assessment daily.  - Set and communicate daily mobility goal to care team and patient/family/caregiver.   - Collaborate with rehabilitation services on mobility goals if consulted  - Reposition patient every 2 hours.  - Out of bed for toileting  - Record patient progress and toleration of activity level   Outcome: Progressing     Problem: DISCHARGE PLANNING  Goal: Discharge to home or other facility with appropriate resources  Description: INTERVENTIONS:  - Identify barriers to discharge w/patient and caregiver  - Arrange for needed discharge resources and transportation as appropriate  - Identify discharge learning needs (meds, wound care, etc.)  - Arrange for interpretive services to assist at discharge as needed  - Refer to Case Management Department for coordinating discharge planning if the patient needs post-hospital services based on physician/advanced practitioner  order or complex needs related to functional status, cognitive ability, or social support system  Outcome: Progressing     Problem: Knowledge Deficit  Goal: Patient/family/caregiver demonstrates understanding of disease process, treatment plan, medications, and discharge instructions  Description: Complete learning assessment and assess knowledge base.  Interventions:  - Provide teaching at level of understanding  - Provide teaching via preferred learning methods  Outcome: Progressing     Problem: Prexisting or High Potential for Compromised Skin Integrity  Goal: Skin integrity is maintained or improved  Description: INTERVENTIONS:  - Identify patients at risk for skin breakdown  - Assess and monitor skin integrity  - Assess and monitor nutrition and hydration status  - Monitor labs   - Assess for incontinence   - Turn and reposition patient  - Assist with mobility/ambulation  - Relieve pressure over bony prominences  - Avoid friction and shearing  - Provide appropriate hygiene as needed including keeping skin clean and dry  - Evaluate need for skin moisturizer/barrier cream  - Collaborate with interdisciplinary team   - Patient/family teaching  - Consider wound care consult   Outcome: Progressing     Problem: Nutrition/Hydration-ADULT  Goal: Nutrient/Hydration intake appropriate for improving, restoring or maintaining nutritional needs  Description: Monitor and assess patient's nutrition/hydration status for malnutrition. Collaborate with interdisciplinary team and initiate plan and interventions as ordered.  Monitor patient's weight and dietary intake as ordered or per policy. Utilize nutrition screening tool and intervene as necessary. Determine patient's food preferences and provide high-protein, high-caloric foods as appropriate.     INTERVENTIONS:  - Monitor oral intake, urinary output, labs, and treatment plans  - Assess nutrition and hydration status and recommend course of action  - Evaluate amount of meals  eaten  - Assist patient with eating if necessary   - Allow adequate time for meals  - Recommend/ encourage appropriate diets, oral nutritional supplements, and vitamin/mineral supplements  - Order, calculate, and assess calorie counts as needed  - Assess need for intravenous fluids  - Provide specific nutrition/hydration education as appropriate  - Include patient/family/caregiver in decisions related to nutrition  Outcome: Progressing

## 2025-01-13 NOTE — ASSESSMENT & PLAN NOTE
In the setting of recent hematuria, Sparks catheter insertion/removal over the past few weeks.  UA with pyuria although difficult to assess in the setting of the Sparks catheter.  His urine culture is interestingly growing a different organism in the blood; urine is growing Chyyseobacterium indologenes, blood culture is growing Pseudomonas aeruginosa.   -Continue IV cefepime   -Follow-up blood and urine cultures   -Check CT A/P   -Urology following

## 2025-01-14 LAB
ALBUMIN SERPL BCG-MCNC: 3 G/DL (ref 3.5–5)
ALP SERPL-CCNC: 40 U/L (ref 34–104)
ALT SERPL W P-5'-P-CCNC: 9 U/L (ref 7–52)
ANION GAP SERPL CALCULATED.3IONS-SCNC: 9 MMOL/L (ref 4–13)
AST SERPL W P-5'-P-CCNC: 8 U/L (ref 13–39)
BACTERIA BLD CULT: ABNORMAL
BASOPHILS # BLD AUTO: 0.02 THOUSANDS/ΜL (ref 0–0.1)
BASOPHILS NFR BLD AUTO: 0 % (ref 0–1)
BILIRUB SERPL-MCNC: 0.62 MG/DL (ref 0.2–1)
BUN SERPL-MCNC: 17 MG/DL (ref 5–25)
CALCIUM ALBUM COR SERPL-MCNC: 8.5 MG/DL (ref 8.3–10.1)
CALCIUM SERPL-MCNC: 7.7 MG/DL (ref 8.4–10.2)
CHLORIDE SERPL-SCNC: 109 MMOL/L (ref 96–108)
CO2 SERPL-SCNC: 25 MMOL/L (ref 21–32)
CREAT SERPL-MCNC: 0.91 MG/DL (ref 0.6–1.3)
EOSINOPHIL # BLD AUTO: 0.25 THOUSAND/ΜL (ref 0–0.61)
EOSINOPHIL NFR BLD AUTO: 4 % (ref 0–6)
ERYTHROCYTE [DISTWIDTH] IN BLOOD BY AUTOMATED COUNT: 12.7 % (ref 11.6–15.1)
GFR SERPL CREATININE-BSD FRML MDRD: 74 ML/MIN/1.73SQ M
GLUCOSE SERPL-MCNC: 77 MG/DL (ref 65–140)
GRAM STN SPEC: ABNORMAL
HCT VFR BLD AUTO: 37.5 % (ref 36.5–49.3)
HGB BLD-MCNC: 12 G/DL (ref 12–17)
IMM GRANULOCYTES # BLD AUTO: 0.02 THOUSAND/UL (ref 0–0.2)
IMM GRANULOCYTES NFR BLD AUTO: 0 % (ref 0–2)
LYMPHOCYTES # BLD AUTO: 0.54 THOUSANDS/ΜL (ref 0.6–4.47)
LYMPHOCYTES NFR BLD AUTO: 9 % (ref 14–44)
MCH RBC QN AUTO: 29.1 PG (ref 26.8–34.3)
MCHC RBC AUTO-ENTMCNC: 32 G/DL (ref 31.4–37.4)
MCV RBC AUTO: 91 FL (ref 82–98)
MONOCYTES # BLD AUTO: 0.53 THOUSAND/ΜL (ref 0.17–1.22)
MONOCYTES NFR BLD AUTO: 9 % (ref 4–12)
NEUTROPHILS # BLD AUTO: 4.38 THOUSANDS/ΜL (ref 1.85–7.62)
NEUTS SEG NFR BLD AUTO: 78 % (ref 43–75)
NRBC BLD AUTO-RTO: 0 /100 WBCS
P AERUGINOSA DNA BLD POS NAA+NON-PROBE: DETECTED
PLATELET # BLD AUTO: 176 THOUSANDS/UL (ref 149–390)
PMV BLD AUTO: 11.3 FL (ref 8.9–12.7)
POTASSIUM SERPL-SCNC: 4.5 MMOL/L (ref 3.5–5.3)
PROCALCITONIN SERPL-MCNC: 1.43 NG/ML
PROT SERPL-MCNC: 5.4 G/DL (ref 6.4–8.4)
RBC # BLD AUTO: 4.13 MILLION/UL (ref 3.88–5.62)
SODIUM SERPL-SCNC: 143 MMOL/L (ref 135–147)
WBC # BLD AUTO: 5.74 THOUSAND/UL (ref 4.31–10.16)

## 2025-01-14 PROCEDURE — 85025 COMPLETE CBC W/AUTO DIFF WBC: CPT

## 2025-01-14 PROCEDURE — 99233 SBSQ HOSP IP/OBS HIGH 50: CPT | Performed by: STUDENT IN AN ORGANIZED HEALTH CARE EDUCATION/TRAINING PROGRAM

## 2025-01-14 PROCEDURE — 84145 PROCALCITONIN (PCT): CPT

## 2025-01-14 PROCEDURE — 97535 SELF CARE MNGMENT TRAINING: CPT

## 2025-01-14 PROCEDURE — 80053 COMPREHEN METABOLIC PANEL: CPT

## 2025-01-14 PROCEDURE — G0545 PR INHERENT VISIT TO INPT: HCPCS | Performed by: STUDENT IN AN ORGANIZED HEALTH CARE EDUCATION/TRAINING PROGRAM

## 2025-01-14 PROCEDURE — 97167 OT EVAL HIGH COMPLEX 60 MIN: CPT

## 2025-01-14 RX ORDER — CEFEPIME HYDROCHLORIDE 2 G/50ML
2000 INJECTION, SOLUTION INTRAVENOUS EVERY 12 HOURS
Status: DISCONTINUED | OUTPATIENT
Start: 2025-01-14 | End: 2025-01-17 | Stop reason: HOSPADM

## 2025-01-14 RX ADMIN — GABAPENTIN 100 MG: 100 CAPSULE ORAL at 21:11

## 2025-01-14 RX ADMIN — CEFEPIME HYDROCHLORIDE 2000 MG: 2 INJECTION, SOLUTION INTRAVENOUS at 05:58

## 2025-01-14 RX ADMIN — TICAGRELOR 60 MG: 60 TABLET ORAL at 08:57

## 2025-01-14 RX ADMIN — POLYETHYLENE GLYCOL 3350 17 G: 17 POWDER, FOR SOLUTION ORAL at 21:11

## 2025-01-14 RX ADMIN — SENNOSIDES 8.6 MG: 8.6 TABLET, FILM COATED ORAL at 17:33

## 2025-01-14 RX ADMIN — APIXABAN 5 MG: 5 TABLET, FILM COATED ORAL at 08:51

## 2025-01-14 RX ADMIN — LISINOPRIL 5 MG: 5 TABLET ORAL at 08:51

## 2025-01-14 RX ADMIN — DOCUSATE SODIUM 100 MG: 100 CAPSULE, LIQUID FILLED ORAL at 08:51

## 2025-01-14 RX ADMIN — SENNOSIDES 8.6 MG: 8.6 TABLET, FILM COATED ORAL at 08:51

## 2025-01-14 RX ADMIN — CARBIDOPA AND LEVODOPA 1 TABLET: 25; 100 TABLET ORAL at 21:11

## 2025-01-14 RX ADMIN — CARBIDOPA AND LEVODOPA 1 TABLET: 25; 100 TABLET ORAL at 08:51

## 2025-01-14 RX ADMIN — APIXABAN 5 MG: 5 TABLET, FILM COATED ORAL at 17:33

## 2025-01-14 RX ADMIN — CARBIDOPA AND LEVODOPA 1 TABLET: 25; 100 TABLET ORAL at 17:33

## 2025-01-14 RX ADMIN — SODIUM CHLORIDE 75 ML/HR: 0.9 INJECTION, SOLUTION INTRAVENOUS at 17:38

## 2025-01-14 RX ADMIN — TAMSULOSIN HYDROCHLORIDE 0.4 MG: 0.4 CAPSULE ORAL at 17:33

## 2025-01-14 RX ADMIN — DOCUSATE SODIUM 100 MG: 100 CAPSULE, LIQUID FILLED ORAL at 17:33

## 2025-01-14 RX ADMIN — CEFEPIME HYDROCHLORIDE 2000 MG: 2 INJECTION, SOLUTION INTRAVENOUS at 17:32

## 2025-01-14 RX ADMIN — TICAGRELOR 60 MG: 60 TABLET ORAL at 21:12

## 2025-01-14 RX ADMIN — LEVOTHYROXINE SODIUM 100 MCG: 100 TABLET ORAL at 06:04

## 2025-01-14 RX ADMIN — AMLODIPINE BESYLATE 5 MG: 5 TABLET ORAL at 08:51

## 2025-01-14 RX ADMIN — CHOLECALCIFEROL (VITAMIN D3) 10 MCG (400 UNIT) TABLET 2000 UNITS: at 08:51

## 2025-01-14 NOTE — OCCUPATIONAL THERAPY NOTE
"Occupational Therapy Evaluation/Treatment       01/14/25 1039   OT Last Visit   OT Visit Date 01/14/25   Note Type   Note type Evaluation   Pain Assessment   Pain Assessment Tool 0-10   Pain Score No Pain   Restrictions/Precautions   Other Precautions Chair Alarm;Bed Alarm;Fall Risk   Home Living   Type of Home Apartment   Home Layout One level;Elevator   Bathroom Shower/Tub Walk-in shower   Bathroom Toilet Raised   Bathroom Equipment Shower chair;Commode  (uses commode over toilet)   Home Equipment Walker  (rollator when outside, RW when inside)   Prior Function   Level of [place] Needs assistance with ADLs;Independent with functional mobility;Needs assistance with IADLS   Lives With Spouse   Receives Help From Family   IADLs Family/Friend/Other provides transportation;Family/Friend/Other provides meals;Family/Friend/Other provides medication management   Falls in the last 6 months 1 to 4   Comments Wife present states that patient in good weather walks with her to the gym at their apartment complex   Lifestyle   Reciprocal Relationships wife present for session, assists pt with socks and supervision for bathing and all IADLS at baseline   General   Additional Pertinent History Patient with history of Parkinsons   Subjective   Subjective \"Nurse I need to go to the bathroom\"   ADL   Eating Assistance 4  Minimal Assistance   Grooming Assistance 4  Minimal Assistance   UB Bathing Assistance 3  Moderate Assistance   LB Bathing Assistance 2  Maximal Assistance   UB Dressing Assistance 3  Moderate Assistance   LB Dressing Assistance 2  Maximal Assistance   Toileting Assistance  2  Maximal Assistance   Bed Mobility   Supine to Sit 4  Minimal assistance   Additional items Assist x 1;Verbal cues   Transfers   Sit to Stand 3  Moderate assistance   Additional items Assist x 1;Verbal cues   Stand to Sit 3  Moderate assistance   Additional items Assist x 1;Verbal cues   Functional Mobility   Functional Mobility 3  Moderate " assistance   Additional Comments few steps with RW, verbal cues   Balance   Static Sitting Fair -   Dynamic Sitting Poor +   Static Standing Poor   Dynamic Standing Poor   Activity Tolerance   Activity Tolerance Patient limited by fatigue  (weakness)   Nurse Made Aware yes Cristino   RUE Assessment   RUE Assessment WFL   LUE Assessment   LUE Assessment WFL   Hand Function   Fine Motor Coordination   (pill rolling tremors BUE)   Cognition   Overall Cognitive Status WFL   Arousal/Participation Cooperative   Attention Attends with cues to redirect   Orientation Level Oriented to person;Oriented to place   Following Commands Follows one step commands with increased time or repetition   Comments pt is [place]   Assessment   Limitation Decreased ADL status;Decreased UE strength;Decreased Safe judgement during ADL;Decreased endurance;Decreased self-care trans;Decreased high-level ADLs  (decreased balance and mobility)   Prognosis Good   Assessment Patient evaluated by Occupational Therapy.  Patient admitted with Complicated UTI (urinary tract infection).  The patients occupational profile, medical and therapy history includes a extensive additional review of physical, cognitive, or psychosocial history related to current functional performance.  Comorbidities affecting functional mobility and ADLS include: CAD, CVA, Parkinsons, and PE.  Prior to admission, patient was independent with functional mobility with walker, requiring assist for ADLS, and requiring assist for IADLS.  The evaluation identifies the following performance deficits: weakness, impaired balance, decreased endurance, decreased coordination, increased fall risk, new onset of impairment of functional mobility, decreased ADLS, decreased IADLS, decreased activity tolerance, decreased safety awareness, impaired judgement, and decreased strength, that result in activity limitations and/or participation restrictions. This evaluation requires clinical  decision making of high complexity, because the patient presents with comorbidites that affect occupational performance and required significant modification of tasks or assistance with consideration of multiple treatment options.  The Barthel Index was used as a functional outcome tool presenting with a score of Barthel Index Score: 20, indicating marked limitations of functional mobility and ADLS.  The patient's raw score on the AM-PAC Daily Activity Inpatient Short Form is 12. A raw score of less than 19 suggests the patient may benefit from discharge to post-acute rehabilitation services. Please refer to the recommendation of the Occupational Therapist for safe discharge planning.  Patient will benefit from skilled Occupational Therapy services to address above deficits and facilitate a safe return to prior level of function.   Goals   Patient Goals to get stronger and go home   STG Time Frame   (1-7 days)   Short Term Goal  Goals established to promote Patient Goals: to get stronger and go home:  Eating: supervision; Grooming: supervision seated; Bathing: mod assist; Upper Body Dressing min assist; Lower Body Dressing: mod assist; Toileting: mod assist; Patient will increase stand pivot commode transfer to min assist with rolling walker to increase performance and safety with ADLS and functional mobility; Patient will increase standing tolerance to 3 minutes during ADL task to decrease assistance level and decrease fall risk; Patient will increase bed mobility to supervision in preparation for ADLS and transfers; Patient will increase functional mobility to and from bathroom with rolling walker with min assist to increase performance with ADLS and to use a toilet; Patient will tolerate 5 minutes of UE ROM/strengthening to increase general activity tolerance and performance in ADLS/IADLS; Patient will improve functional activity tolerance to 10 minutes of sustained functional tasks to increase participation in  basic self-care and decrease assistance level;  Patient will increase dynamic sitting balance to fair- to improve the ability to sit at edge of bed or on a chair for ADLS;  Patient will increase dynamic standing balance to poor+ to improve postural stability and decrease fall risk during standing ADLS and transfers.   LTG Time Frame   (8-14 days)   Long Term Goal Eating: independent; Grooming: independent seated; Bathing: min assist; Upper Body Dressing supervision; Lower Body Dressing: min assist; Toileting: min assist; Patient will increase stand pivot commode transfer to supervision with rolling walker to increase performance and safety with ADLS and functional mobility; Patient will increase standing tolerance to 6 minutes during ADL task to decrease assistance level and decrease fall risk; Patient will increase bed mobility to independent in preparation for ADLS and transfers; Patient will increase functional mobility to and from bathroom with rolling walker with supervision to increase performance with ADLS and to use a toilet; Patient will tolerate 10 minutes of UE ROM/strengthening to increase general activity tolerance and performance in ADLS/IADLS; Patient will improve functional activity tolerance to 20 minutes of sustained functional tasks to increase participation in basic self-care and decrease assistance level;  Patient will increase dynamic sitting balance to fair to improve the ability to sit at edge of bed or on a chair for ADLS;  Patient will increase dynamic standing balance to fair- to improve postural stability and decrease fall risk during standing ADLS and transfers.  Pt will score >/= 16/24 on AM-PAC Daily Activity Inpatient scale to promote safe independence with ADLs and functional mobility; Pt will score >/= 50/100 on Barthel Index in order to decrease caregiver assistance needed and increase ability to perform ADLs and functional mobility.   Plan   Treatment Interventions ADL  retraining;Functional transfer training;UE strengthening/ROM;Endurance training;Patient/family training;Equipment evaluation/education;Activityengagement;Compensatory technique education   Goal Expiration Date 01/28/25   OT Frequency 3-5x/wk   Discharge Recommendation   Rehab Resource Intensity Level, OT II (Moderate Resource Intensity)   AM-PAC Daily Activity Inpatient   Lower Body Dressing 1   Bathing 2   Toileting 1   Upper Body Dressing 2   Grooming 3   Eating 3   Daily Activity Raw Score 12   Daily Activity Standardized Score (Calc for Raw Score >=11) 30.6   AM-PAC Applied Cognition Inpatient   Following a Speech/Presentation 3   Understanding Ordinary Conversation 4   Taking Medications 2   Remembering Where Things Are Placed or Put Away 2   Remembering List of 4-5 Errands 2   Taking Care of Complicated Tasks 2   Applied Cognition Raw Score 15   Applied Cognition Standardized Score 33.54   Barthel Index   Feeding 5   Bathing 0   Grooming Score 0   Dressing Score 0   Bladder Score 0   Bowels Score 5   Toilet Use Score 5   Transfers (Bed/Chair) Score 5   Mobility (Level Surface) Score 0   Stairs Score 0   Barthel Index Score 20   Additional Treatment Session   Start Time 1029   End Time 1039   Treatment Assessment S: denies pain O: Completed commode transfer with mod assist.  Hygiene for large BM max assist.  Functional mobility with RW mod assist commode to bed.  Sit to stand from bed max assist,  few steps bed to chair with mod assist with RW, stand to sit max assist.  A: Patient is cooperative and pleasant.  Patients wife reports patient is doing better today.  Patient able to answer question and follow commands.  Patient will benefit from continued OT services to maximize functional performance with ADLS.  P: II-moderate resource intensity.   Licensure   [place] License Number  Laxmi Bazan MS OTR/L [id number]

## 2025-01-14 NOTE — ASSESSMENT & PLAN NOTE
In the setting of recent hematuria, Sparks catheter insertion/removal over the past few weeks.  UA with pyuria although difficult to assess in the setting of the Sparks catheter.  Urine culture is growing Pseudomonas as well as Chryseobacterium indologenes, blood culture is growing Pseudomonas aeruginosa. CT A/P suggestive of cystitis.   -Continue IV cefepime   -Urology following

## 2025-01-14 NOTE — PROGRESS NOTES
Progress Note - Infectious Disease   Name: Pavan Edward 89 y.o. male I MRN: [id number]  Unit/Bed#: 3 [address] I Date of Admission: 1/10/2025   Date of Service: 1/14/2025 I Hospital Day: 4    Assessment & Plan  Bacteremia due to Pseudomonas  1 of 2 sets admission blood cultures is growing Pseudomonas aeruginosa. Due to a urinary source; UA shows pyuria and urine culture is also growing Pseudomonas as well as Chyyseobacterium indologenes.  CT A/P shows perivesicular fat stranding, bladder wall thickening and enhancement suggesting cystitis. WBC count has normalized.  The patient is hemodynamically stable.  No intravascular prosthetic devices. TTE no vegetations.    -Continue IV cefepime but will decrease dose to 2g every 12 hours due to low SHIRLEY   -Follow-up repeat blood cultures   -Will avoid fluoroquinolones due to patient's iliac artery aneurysm, age   -Patient will need to complete a course of IV antibiotics, duration and drug choice will be dependent on clinical course   -can place PICC tomorrow if repeat blood cultures remain negative   -Monitor CBC, CMP to assess for treatment response and drug toxicity  Complicated UTI (urinary tract infection)  In the setting of recent hematuria, Sparks catheter insertion/removal over the past few weeks.  UA with pyuria although difficult to assess in the setting of the Sparks catheter.  Urine culture is growing Pseudomonas as well as Chryseobacterium indologenes, blood culture is growing Pseudomonas aeruginosa. CT A/P suggestive of cystitis.   -Continue IV cefepime   -Urology following  Acute metabolic encephalopathy  In the setting of acute infection.  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 Mental status improving   -Monitor mental status   -Will continue cefepime for now but if mental status worsens may need to alter treatment  Parkinson's disease (HCC)  Continue home medications per primary.  Paroxysmal atrial fibrillation  (HCC)  Anticoagulation per primary.  Aortic dissection, abdominal (HCC)  Patient with a history of infrarenal aortic dissection, known iliac artery aneurysm.  Will avoid fluoroquinolones.    Above management plan to continue Cefepime discussed with Dr. Urbina. Discussed with the patient and wife at bedside. ID will follow.    Antibiotics:  Cefepime day 3    Subjective   The patient is more awake today, seen sitting up in the chair. He states he is tired. No other complaints. No fever, chills, nausea, abdominal pain.    Objective :  Temp:  [97.7 °F (36.5 °C)-98 °F (36.7 °C)] 98 °F (36.7 °C)  HR:  [61-69] 63  BP: (144-160)/(66-72) 144/66  Resp:  [18] 18  SpO2:  [93 %-96 %] 93 %  O2 Device: None (Room air)    General: chronically ill appearing but no acute distress, awake and alert today  Pulmonary:  Normal respiratory excursion without accessory muscle use  Abdomen:  Soft, nontender  : Sparks catheter in place with yellow urine  Extremities:  No edema  Skin:  No rashes  Neuro: Upper extremity tremor      Lab Results: I have reviewed the following results:  Results from last 7 days   Lab Units 01/14/25  0458 01/13/25  0604 01/12/25  0714   WBC Thousand/uL 5.74 8.03 13.14*   HEMOGLOBIN g/dL 12.0 12.0 11.9*   PLATELETS Thousands/uL 176 165 151     Results from last 7 days   Lab Units 01/14/25  0458 01/13/25  0604 01/12/25  0714   SODIUM mmol/L 143 140 139   POTASSIUM mmol/L 4.5 3.8 4.4   CHLORIDE mmol/L 109* 110* 108   CO2 mmol/L 25 24 25   BUN mg/dL 17 12 19   CREATININE mg/dL 0.91 0.76 0.84   EGFR ml/min/1.73sq m 74 80 77   CALCIUM mg/dL 7.7* 8.2* 7.6*   AST U/L 8* 9* 8*   ALT U/L 9 4* 4*   ALK PHOS U/L 40 39 40   ALBUMIN g/dL 3.0* 3.0* 2.9*     Results from last 7 days   Lab Units 01/13/25  1331 01/10/25  2025 01/10/25  1944   BLOOD CULTURE  Received in Microbiology Lab. Culture in Progress.  Received in Microbiology Lab. Culture in Progress. Pseudomonas aeruginosa*  No Growth at 72 hrs.  --    GRAM STAIN RESULT    --  Gram negative rods*  --    URINE CULTURE   --   --  >100,000 cfu/ml Chryseobacterium indologenes*  >100,000 cfu/ml Pseudomonas aeruginosa*     Results from last 7 days   Lab Units 01/14/25  0458 01/13/25  0604   PROCALCITONIN ng/ml 1.43* 2.05*                 CT A/P reviewed and shows perivesicular stranding, bladder wall thickening

## 2025-01-14 NOTE — ASSESSMENT & PLAN NOTE
Per patient and significant other at bedside he developed gross hematuria after inserting Sparks's catheter on 12/24/2024 he was advised to stop Eliquis and Brilinta given hematuria by emergency medicine provider.  Family reports gross hematuria resolved several days ago.  Eliquis and Brilinta now resumed.    -Currently clear urine noted at bedside in Sparks's catheter bag.  -Continue to monitor    -remains with yellow colored urine. No hematuria . Continue to monitor

## 2025-01-14 NOTE — PROGRESS NOTES
Progress Note - Hospitalist   Name: Pavan Edward 89 y.o. male I MRN: [id number]  Unit/Bed#: 3 [address] I Date of Admission: 1/10/2025   Date of Service: 1/14/2025 I Hospital Day: 4    Assessment & Plan  Complicated UTI (urinary tract infection)  POA with generalized weakness and worsening parkinson symptoms the last 3 days  Follows with urology, benign prostate hyperplasia with urinary retention , Balderrama catheter in place in Ed on 12/25/24  Chart review performed: Seen in urology's office 1/7/2025: Urinary retention  for voiding trial failed balderrama replaced  In Ed noted, Leukocytosis WBCs 17.72, lactic acid and Pro-Zi negative, afebrile  Urine culture;>100,000 cfu/ml Chryseobacterium indologenes Abnormal   UTI present on admission      Continue cefepime 2 g Q 12 hours  Infectious disease consulted; appreciate input  Follow-up with urine culture  Balderrama's catheter remains in place.  Consult urology; patient does not want Balderrama to be removed at this time as he is not moving much.    Bacteremia due to Pseudomonas  Pseudomonas aeruginosa + blood culture     -mostly of urine origin given current acute cystitis    -started on cefepime 2 g Q 12 hours  -infectious diseases following; follow-up repeat cultures plan can place PICC tomorrow if repeat blood cultures are negative.  -Continue to monitor CBC CMP  Gross hematuria  Per patient and significant other at bedside he developed gross hematuria after inserting Balderrama's catheter on 12/24/2024 he was advised to stop Eliquis and Brilinta given hematuria by emergency medicine provider.  Family reports gross hematuria resolved several days ago.  Eliquis and Brilinta now resumed.    -Currently clear urine noted at bedside in Balderrama's catheter bag.  -Continue to monitor    -remains with yellow colored urine. No hematuria . Continue to monitor   CAD (coronary artery disease)  Follow with cardiology  Denies chest pain or palpations   S/p PCI x2 on 8/13/2023  Brilinta was on hold  on 12/24 per ED given gross hematuria.  However it was traumatic secondary to Sparks's catheter insertion.  Currently hematuria cleared per bedside exam and patient.  Brillinta continued during hospitalization      Hypertension  Home medication lisinopril continued   Paroxysmal atrial fibrillation (HCC)  Continue PTA medications    -Eliquis was on hold since 12/24 secondary to gross hematuria after inserting Sparks's catheter.  -Eliquis was resumed during hospital course continue to monitor for any recurrence of hematuria  Parkinson's disease (HCC)  Chronic   Home medication carbidopa-levodopa continued  PT OT consulted   Pain in both lower extremities  POA with bilateral leg pain behind his knees and swelling  Per wife patient Eliquis and ticagrelor have been on hold since he developed hematuria   DVT studies were negative  PRN Tylenol for pain  Elevated lower extremities   PAD (peripheral artery disease) ([facility])  Followed by vascular surgery  Home medicaiton  Brilinta 60 mg every 12 hours      Acute metabolic encephalopathy  Noted per family at bedside.   History of hospital delirium per family at bedside. Similar to this time.     Patient is cooperative, answering quesitons   Alert and oriented to person only at this time .  Significant change from yesterday     Probably multifactorial in the setting of hospitalization / deconditioning / active infection / antibiotic use.   No focal deficit identified.     -1/12/2025; CT head; negative.  No acute findings.    -1/13/2025; patient is more alert and oriented currently to person and place.  Still with some confusion however compared to yesterday with remarkable improvement.    -delirium precautions  -minimize lab tests as possible   -arrange medication timings   -Minimize opioids/benzodiazepine use.    Aortic dissection, abdominal (HCC)    UTI (urinary tract infection)      VTE Pharmacologic Prophylaxis:   Moderate Risk (Score 3-4) - Pharmacological DVT Prophylaxis  Ordered: apixaban (Eliquis).    Mobility:   Basic Mobility Inpatient Raw Score: 15  JH-HLM Goal: 4: Move to chair/commode  JH-HLM Achieved: 5: Stand (1 or more minutes)  JH-HLM Goal NOT achieved. Continue with multidisciplinary rounding and encourage appropriate mobility to improve upon JH-HLM goals.    Patient Centered Rounds: I performed bedside rounds with nursing staff today.   Discussions with Specialists or Other Care Team Provider: Infectious disease via [place] text    Education and Discussions with Family / Patient: Updated  (wife) at bedside.    Current Length of Stay: 4 day(s)  Current Patient Status: Inpatient   Certification Statement: The patient will continue to require additional inpatient hospital stay due to bacteremia  Discharge Plan: Anticipate discharge in 24-48 hrs to rehab facility.    Code Status: Level 3 - DNAR and DNI    Subjective   Patient seen and examined this morning at bedside no specific complaints  Patient did have bowel movement per nursing    Objective :  Temp:  [97.7 °F (36.5 °C)-98 °F (36.7 °C)] 97.8 °F (36.6 °C)  HR:  [61-69] 64  BP: (144-178)/(66-79) 178/79  Resp:  [17-18] 17  SpO2:  [93 %-96 %] 93 %  O2 Device: None (Room air)    Body mass index is 23.75 kg/m².     Input and Output Summary (last 24 hours):     Intake/Output Summary (Last 24 hours) at 1/14/2025 1735  Last data filed at 1/14/2025 0612  Gross per 24 hour   Intake --   Output 725 ml   Net -725 ml       Physical Exam  Constitutional:       Appearance: Normal appearance.      Comments: Frail elderly in bed tremors no acute distress nontoxic   HENT:      Head: Normocephalic and atraumatic.      Mouth/Throat:      Mouth: Mucous membranes are moist.      Pharynx: Oropharynx is clear.   Eyes:      Extraocular Movements: Extraocular movements intact.      Conjunctiva/sclera: Conjunctivae normal.   Cardiovascular:      Rate and Rhythm: Normal rate and regular rhythm.   Pulmonary:      Effort: Pulmonary effort  is normal.      Breath sounds: Normal breath sounds.   Abdominal:      General: Abdomen is flat. Bowel sounds are normal.      Palpations: Abdomen is soft.   Genitourinary:     Comments: Sparks with yellow urine  Musculoskeletal:         General: Normal range of motion.      Comments: No pitting edema appreciated   Skin:     General: Skin is warm and dry.   Neurological:      Mental Status: He is alert.      Comments: Tremors upper extremity           Lines/Drains:  Lines/Drains/Airways       Active Status       Name Placement date Placement time Site Days    Urethral Catheter Latex 16 Fr. 12/25/24  0643  Latex  20                  Urinary Catheter:  Goal for removal: Voiding trial when ambulation improves                 Lab Results: I have reviewed the following results:   Results from last 7 days   Lab Units 01/14/25  0458   WBC Thousand/uL 5.74   HEMOGLOBIN g/dL 12.0   HEMATOCRIT % 37.5   PLATELETS Thousands/uL 176   SEGS PCT % 78*   LYMPHO PCT % 9*   MONO PCT % 9   EOS PCT % 4     Results from last 7 days   Lab Units 01/14/25  0458   SODIUM mmol/L 143   POTASSIUM mmol/L 4.5   CHLORIDE mmol/L 109*   CO2 mmol/L 25   BUN mg/dL 17   CREATININE mg/dL 0.91   ANION GAP mmol/L 9   CALCIUM mg/dL 7.7*   ALBUMIN g/dL 3.0*   TOTAL BILIRUBIN mg/dL 0.62   ALK PHOS U/L 40   ALT U/L 9   AST U/L 8*   GLUCOSE RANDOM mg/dL 77         Results from last 7 days   Lab Units 01/13/25  0712 01/10/25  1909   POC GLUCOSE mg/dl 104 131         Results from last 7 days   Lab Units 01/14/25  0458 01/13/25  0604 01/10/25  2210   LACTIC ACID mmol/L  --   --  0.6   PROCALCITONIN ng/ml 1.43* 2.05*  --        Recent Cultures (last 7 days):   Results from last 7 days   Lab Units 01/13/25  1331 01/10/25  2025 01/10/25  1944   BLOOD CULTURE  Received in Microbiology Lab. Culture in Progress.  Received in Microbiology Lab. Culture in Progress. Pseudomonas aeruginosa*  No Growth at 72 hrs.  --    GRAM STAIN RESULT   --  Gram negative rods*  --     URINE CULTURE   --   --  >100,000 cfu/ml Chryseobacterium indologenes*  >100,000 cfu/ml Pseudomonas aeruginosa*             Last 24 Hours Medication List:     Current Facility-Administered Medications:     acetaminophen (TYLENOL) tablet 650 mg, Q6H PRN    amLODIPine (NORVASC) tablet 5 mg, Daily    apixaban (ELIQUIS) tablet 5 mg, BID    carbidopa-levodopa (SINEMET)  mg per tablet 1 tablet, TID    cefepime (MAXIPIME) IVPB (premix in dextrose) 2,000 mg 50 mL, Q12H, Last Rate: 2,000 mg (01/14/25 1732)    Cholecalciferol (VITAMIN D3) tablet 2,000 Units, Daily    docusate sodium (COLACE) capsule 100 mg, BID    gabapentin (NEURONTIN) capsule 100 mg, HS    levothyroxine tablet 100 mcg, Early Morning    lisinopril (ZESTRIL) tablet 5 mg, Daily    ondansetron (ZOFRAN) injection 4 mg, Q6H PRN    polyethylene glycol (MIRALAX) packet 17 g, Daily PRN    polyethylene glycol (MIRALAX) packet 17 g, HS    senna (SENOKOT) tablet 8.6 mg, BID    sodium chloride 0.9 % infusion, Continuous, Last Rate: 75 mL/hr (01/13/25 2152)    tamsulosin (FLOMAX) capsule 0.4 mg, Daily With Dinner    ticagrelor tablet 60 mg, Q12H SAM    Administrative Statements   Today, Patient Was Seen By: Dana Urbina MD      **Please Note: This note may have been constructed using a voice recognition system.**

## 2025-01-14 NOTE — ASSESSMENT & PLAN NOTE
Pseudomonas aeruginosa + blood culture     -mostly of urine origin given current acute cystitis    -started on cefepime 2 g Q 12 hours  -infectious diseases following; follow-up repeat cultures plan can place PICC tomorrow if repeat blood cultures are negative.  -Continue to monitor CBC CMP

## 2025-01-14 NOTE — ASSESSMENT & PLAN NOTE
POA with generalized weakness and worsening parkinson symptoms the last 3 days  Follows with urology, benign prostate hyperplasia with urinary retention , Balderrama catheter in place in Ed on 12/25/24  Chart review performed: Seen in urology's office 1/7/2025: Urinary retention  for voiding trial failed balderrama replaced  In Ed noted, Leukocytosis WBCs 17.72, lactic acid and Pro-Zi negative, afebrile  Urine culture;>100,000 cfu/ml Chryseobacterium indologenes Abnormal   UTI present on admission      Continue cefepime 2 g Q 12 hours  Infectious disease consulted; appreciate input  Follow-up with urine culture  Balderrama's catheter remains in place.  Consult urology; patient does not want Balderrama to be removed at this time as he is not moving much.

## 2025-01-14 NOTE — ASSESSMENT & PLAN NOTE
In the setting of acute infection.  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 Mental status improving   -Monitor mental status   -Will continue cefepime for now but if mental status worsens may need to alter treatment

## 2025-01-14 NOTE — ASSESSMENT & PLAN NOTE
1 of 2 sets admission blood cultures is growing Pseudomonas aeruginosa. Due to a urinary source; UA shows pyuria and urine culture is also growing Pseudomonas as well as Chyyseobacterium indologenes.  CT A/P shows perivesicular fat stranding, bladder wall thickening and enhancement suggesting cystitis. WBC count has normalized.  The patient is hemodynamically stable.  No intravascular prosthetic devices. TTE no vegetations.    -Continue IV cefepime but will decrease dose to 2g every 12 hours due to low SHIRLEY   -Follow-up repeat blood cultures   -Will avoid fluoroquinolones due to patient's iliac artery aneurysm, age   -Patient will need to complete a course of IV antibiotics, duration and drug choice will be dependent on clinical course   -can place PICC tomorrow if repeat blood cultures remain negative   -Monitor CBC, CMP to assess for treatment response and drug toxicity

## 2025-01-14 NOTE — ASSESSMENT & PLAN NOTE
Continue PTA medications    -Eliquis was on hold since 12/24 secondary to gross hematuria after inserting Sparks's catheter.  -Eliquis was resumed during hospital course continue to monitor for any recurrence of hematuria

## 2025-01-14 NOTE — PROGRESS NOTES
"Progress Note - Urology      Patient: Pavan Edward   : 1935 Sex: male   MRN: [id number]     CSN: [id number]  Unit/Bed#: [address] [place] [address]     SUBJECTIVE:   Pt seenrounds  No issue balderrama  Not ambulating out of bed  Long discussion with son tonight about urologic history getting up 3 times a night for at least 20 years slow stream has never seen a urologist        Objective   Vitals: /66 (BP Location: Left arm)   Pulse 63   Temp 98 °F (36.7 °C) (Oral)   Resp 18   Ht 6' 1\" (1.854 m)   Wt 81.6 kg (180 lb)   SpO2 93%   BMI 23.75 kg/m²     I/O last 24 hours:  In: 120 [P.O.:120]  Out:  [Urine:1975]      Physical Exam:   General Alert awake   Normocephalic atraumatic PERRLA  Lungs clear bilaterally  Cardiac normal S1 normal S2  Abdomen soft, flank pain  Balderrama draining clear urine  Extremities no edema      Lab Results: CBC:   Lab Results   Component Value Date    WBC 5.74 2025    HGB 12.0 2025    HCT 37.5 2025    MCV 91 2025     2025    RBC 4.13 2025    MCH 29.1 2025    MCHC 32.0 2025    RDW 12.7 2025    MPV 11.3 2025    NRBC 0 2025     CMP:   Lab Results   Component Value Date     (H) 2025    CO2 25 2025    BUN 17 2025    CREATININE 0.91 2025    CALCIUM 7.7 (L) 2025    AST 8 (L) 2025    ALT 9 2025    ALKPHOS 40 2025    EGFR 74 2025     Urinalysis:   Lab Results   Component Value Date    COLORU Yellow 01/10/2025    CLARITYU Clear 01/10/2025    SPECGRAV 1.025 01/10/2025    PHUR 6.5 01/10/2025    LEUKOCYTESUR Large (A) 01/10/2025    NITRITE Positive (A) 01/10/2025    GLUCOSEU Negative 01/10/2025    KETONESU Negative 01/10/2025    BILIRUBINUR Negative 01/10/2025    BLOODU Large (A) 01/10/2025     Urine Culture:   Lab Results   Component Value Date    URINECX >100,000 cfu/ml Chryseobacterium indologenes (A) 01/10/2025    URINECX >100,000 cfu/ml Pseudomonas aeruginosa (A) " "01/10/2025     PSA: No results found for: \"PSA\"      Assessment/ Plan:  Urinary retention  Sparks trauma  Complicated uti  Pseudomonas UTI  Continue cefepime as per ID  On tamsulosin 0.4 mg  Will DC Sparks tomorrow voiding trial            Oliver Cherry MD  "

## 2025-01-14 NOTE — ASSESSMENT & PLAN NOTE
POA with bilateral leg pain behind his knees and swelling  Per wife patient Eliquis and ticagrelor have been on hold since he developed hematuria   DVT studies were negative  PRN Tylenol for pain  Elevated lower extremities

## 2025-01-14 NOTE — PLAN OF CARE
Problem: Potential for Falls  Goal: Patient will remain free of falls  Description: INTERVENTIONS:  - Educate patient/family on patient safety including physical limitations  - Instruct patient to call for assistance with activity   - Consult OT/PT to assist with strengthening/mobility   - Keep Call bell within reach  - Keep bed low and locked with side rails adjusted as appropriate  - Keep care items and personal belongings within reach  - Initiate and maintain comfort rounds  - Make Fall Risk Sign visible to staff  - Offer Toileting every 2 Hours, in advance of need  - Initiate/Maintain bed alarm  - Obtain necessary fall risk management equipment: sit to stand  - Apply yellow socks and bracelet for high fall risk patients  - Consider moving patient to room near nurses station  Outcome: Progressing     Problem: PAIN - ADULT  Goal: Verbalizes/displays adequate comfort level or baseline comfort level  Description: Interventions:  - Encourage patient to monitor pain and request assistance  - Assess pain using appropriate pain scale  - Administer analgesics based on type and severity of pain and evaluate response  - Implement non-pharmacological measures as appropriate and evaluate response  - Consider cultural and social influences on pain and pain management  - Notify physician/advanced practitioner if interventions unsuccessful or patient reports new pain  Outcome: Progressing     Problem: INFECTION - ADULT  Goal: Absence or prevention of progression during hospitalization  Description: INTERVENTIONS:  - Assess and monitor for signs and symptoms of infection  - Monitor lab/diagnostic results  - Monitor all insertion sites, i.e. indwelling lines, tubes, and drains  - Monitor endotracheal if appropriate and nasal secretions for changes in amount and color  - [place] appropriate cooling/warming therapies per order  - Administer medications as ordered  - Instruct and encourage patient and family to use good hand  hygiene technique  - Identify and instruct in appropriate isolation precautions for identified infection/condition  Outcome: Progressing  Goal: Absence of fever/infection during neutropenic period  Description: INTERVENTIONS:  - Monitor WBC    Outcome: Progressing     Problem: SAFETY ADULT  Goal: Maintain or return to baseline ADL function  Description: INTERVENTIONS:  -  Assess patient's ability to carry out ADLs; assess patient's baseline for ADL function and identify physical deficits which impact ability to perform ADLs (bathing, care of mouth/teeth, toileting, grooming, dressing, etc.)  - Assess/evaluate cause of self-care deficits   - Assess range of motion  - Assess patient's mobility; develop plan if impaired  - Assess patient's need for assistive devices and provide as appropriate  - Encourage maximum independence but intervene and supervise when necessary  - Involve family in performance of ADLs  - Assess for home care needs following discharge   - Consider OT consult to assist with ADL evaluation and planning for discharge  - Provide patient education as appropriate  Outcome: Progressing  Goal: Maintains/Returns to pre admission functional level  Description: INTERVENTIONS:  - Perform AM-PAC 6 Click Basic Mobility/ Daily Activity assessment daily.  - Set and communicate daily mobility goal to care team and patient/family/caregiver.   - Collaborate with rehabilitation services on mobility goals if consulted  - Perform Range of Motion 3 times a day.  - Reposition patient every 2 hours.  - Dangle patient 3 times a day  - Stand patient 3 times a day  - Ambulate patient 3 times a day  - Out of bed to chair 3 times a day   - Out of bed for meals 3 times a day  - Out of bed for toileting  - Record patient progress and toleration of activity level   Outcome: Progressing     Problem: DISCHARGE PLANNING  Goal: Discharge to home or other facility with appropriate resources  Description: INTERVENTIONS:  - Identify  barriers to discharge w/patient and caregiver  - Arrange for needed discharge resources and transportation as appropriate  - Identify discharge learning needs (meds, wound care, etc.)  - Arrange for interpretive services to assist at discharge as needed  - Refer to Case Management Department for coordinating discharge planning if the patient needs post-hospital services based on physician/advanced practitioner order or complex needs related to functional status, cognitive ability, or social support system  Outcome: Progressing     Problem: Knowledge Deficit  Goal: Patient/family/caregiver demonstrates understanding of disease process, treatment plan, medications, and discharge instructions  Description: Complete learning assessment and assess knowledge base.  Interventions:  - Provide teaching at level of understanding  - Provide teaching via preferred learning methods  Outcome: Progressing     Problem: Prexisting or High Potential for Compromised Skin Integrity  Goal: Skin integrity is maintained or improved  Description: INTERVENTIONS:  - Identify patients at risk for skin breakdown  - Assess and monitor skin integrity  - Assess and monitor nutrition and hydration status  - Monitor labs   - Assess for incontinence   - Turn and reposition patient  - Assist with mobility/ambulation  - Relieve pressure over bony prominences  - Avoid friction and shearing  - Provide appropriate hygiene as needed including keeping skin clean and dry  - Evaluate need for skin moisturizer/barrier cream  - Collaborate with interdisciplinary team   - Patient/family teaching  - Consider wound care consult   Outcome: Progressing     Problem: Nutrition/Hydration-ADULT  Goal: Nutrient/Hydration intake appropriate for improving, restoring or maintaining nutritional needs  Description: Monitor and assess patient's nutrition/hydration status for malnutrition. Collaborate with interdisciplinary team and initiate plan and interventions as ordered.   Monitor patient's weight and dietary intake as ordered or per policy. Utilize nutrition screening tool and intervene as necessary. Determine patient's food preferences and provide high-protein, high-caloric foods as appropriate.     INTERVENTIONS:  - Monitor oral intake, urinary output, labs, and treatment plans  - Assess nutrition and hydration status and recommend course of action  - Evaluate amount of meals eaten  - Assist patient with eating if necessary   - Allow adequate time for meals  - Recommend/ encourage appropriate diets, oral nutritional supplements, and vitamin/mineral supplements  - Order, calculate, and assess calorie counts as needed  - Recommend, monitor, and adjust tube feedings and TPN/PPN based on assessed needs  - Assess need for intravenous fluids  - Provide specific nutrition/hydration education as appropriate  - Include patient/family/caregiver in decisions related to nutrition  Outcome: Progressing

## 2025-01-15 PROBLEM — T83.511A URINARY TRACT INFECTION ASSOCIATED WITH INDWELLING URETHRAL CATHETER  (HCC): Status: ACTIVE | Noted: 2025-01-13

## 2025-01-15 LAB
ANION GAP SERPL CALCULATED.3IONS-SCNC: 1 MMOL/L (ref 4–13)
BACTERIA BLD CULT: NORMAL
BACTERIA UR CULT: ABNORMAL
BASOPHILS # BLD AUTO: 0.03 THOUSANDS/ΜL (ref 0–0.1)
BASOPHILS NFR BLD AUTO: 1 % (ref 0–1)
BUN SERPL-MCNC: 15 MG/DL (ref 5–25)
CALCIUM SERPL-MCNC: 8.2 MG/DL (ref 8.4–10.2)
CHLORIDE SERPL-SCNC: 115 MMOL/L (ref 96–108)
CO2 SERPL-SCNC: 25 MMOL/L (ref 21–32)
CREAT SERPL-MCNC: 0.85 MG/DL (ref 0.6–1.3)
EOSINOPHIL # BLD AUTO: 0.27 THOUSAND/ΜL (ref 0–0.61)
EOSINOPHIL NFR BLD AUTO: 5 % (ref 0–6)
ERYTHROCYTE [DISTWIDTH] IN BLOOD BY AUTOMATED COUNT: 12.5 % (ref 11.6–15.1)
GFR SERPL CREATININE-BSD FRML MDRD: 77 ML/MIN/1.73SQ M
GLUCOSE SERPL-MCNC: 80 MG/DL (ref 65–140)
HCT VFR BLD AUTO: 36.8 % (ref 36.5–49.3)
HGB BLD-MCNC: 11.9 G/DL (ref 12–17)
IMM GRANULOCYTES # BLD AUTO: 0.04 THOUSAND/UL (ref 0–0.2)
IMM GRANULOCYTES NFR BLD AUTO: 1 % (ref 0–2)
LYMPHOCYTES # BLD AUTO: 0.63 THOUSANDS/ΜL (ref 0.6–4.47)
LYMPHOCYTES NFR BLD AUTO: 13 % (ref 14–44)
MAGNESIUM SERPL-MCNC: 2.2 MG/DL (ref 1.9–2.7)
MCH RBC QN AUTO: 29 PG (ref 26.8–34.3)
MCHC RBC AUTO-ENTMCNC: 32.3 G/DL (ref 31.4–37.4)
MCV RBC AUTO: 90 FL (ref 82–98)
MONOCYTES # BLD AUTO: 0.59 THOUSAND/ΜL (ref 0.17–1.22)
MONOCYTES NFR BLD AUTO: 12 % (ref 4–12)
NEUTROPHILS # BLD AUTO: 3.5 THOUSANDS/ΜL (ref 1.85–7.62)
NEUTS SEG NFR BLD AUTO: 68 % (ref 43–75)
NRBC BLD AUTO-RTO: 0 /100 WBCS
PLATELET # BLD AUTO: 196 THOUSANDS/UL (ref 149–390)
PMV BLD AUTO: 11.1 FL (ref 8.9–12.7)
POTASSIUM SERPL-SCNC: 4 MMOL/L (ref 3.5–5.3)
RBC # BLD AUTO: 4.1 MILLION/UL (ref 3.88–5.62)
SODIUM SERPL-SCNC: 141 MMOL/L (ref 135–147)
WBC # BLD AUTO: 5.06 THOUSAND/UL (ref 4.31–10.16)

## 2025-01-15 PROCEDURE — 99233 SBSQ HOSP IP/OBS HIGH 50: CPT | Performed by: STUDENT IN AN ORGANIZED HEALTH CARE EDUCATION/TRAINING PROGRAM

## 2025-01-15 PROCEDURE — G0545 PR INHERENT VISIT TO INPT: HCPCS | Performed by: STUDENT IN AN ORGANIZED HEALTH CARE EDUCATION/TRAINING PROGRAM

## 2025-01-15 PROCEDURE — 83735 ASSAY OF MAGNESIUM: CPT | Performed by: HOSPITALIST

## 2025-01-15 PROCEDURE — 80048 BASIC METABOLIC PNL TOTAL CA: CPT | Performed by: HOSPITALIST

## 2025-01-15 PROCEDURE — 99232 SBSQ HOSP IP/OBS MODERATE 35: CPT | Performed by: INTERNAL MEDICINE

## 2025-01-15 PROCEDURE — 85025 COMPLETE CBC W/AUTO DIFF WBC: CPT | Performed by: HOSPITALIST

## 2025-01-15 RX ORDER — LISINOPRIL 10 MG/1
10 TABLET ORAL DAILY
Status: DISCONTINUED | OUTPATIENT
Start: 2025-01-16 | End: 2025-01-17 | Stop reason: HOSPADM

## 2025-01-15 RX ADMIN — APIXABAN 5 MG: 5 TABLET, FILM COATED ORAL at 17:58

## 2025-01-15 RX ADMIN — LEVOTHYROXINE SODIUM 100 MCG: 100 TABLET ORAL at 05:07

## 2025-01-15 RX ADMIN — TICAGRELOR 60 MG: 60 TABLET ORAL at 09:30

## 2025-01-15 RX ADMIN — SENNOSIDES 8.6 MG: 8.6 TABLET, FILM COATED ORAL at 17:58

## 2025-01-15 RX ADMIN — TICAGRELOR 60 MG: 60 TABLET ORAL at 21:48

## 2025-01-15 RX ADMIN — GABAPENTIN 100 MG: 100 CAPSULE ORAL at 21:49

## 2025-01-15 RX ADMIN — LISINOPRIL 5 MG: 5 TABLET ORAL at 09:30

## 2025-01-15 RX ADMIN — DOCUSATE SODIUM 100 MG: 100 CAPSULE, LIQUID FILLED ORAL at 09:29

## 2025-01-15 RX ADMIN — TAMSULOSIN HYDROCHLORIDE 0.4 MG: 0.4 CAPSULE ORAL at 16:08

## 2025-01-15 RX ADMIN — CARBIDOPA AND LEVODOPA 1 TABLET: 25; 100 TABLET ORAL at 16:08

## 2025-01-15 RX ADMIN — CHOLECALCIFEROL (VITAMIN D3) 10 MCG (400 UNIT) TABLET 2000 UNITS: at 09:29

## 2025-01-15 RX ADMIN — CEFEPIME HYDROCHLORIDE 2000 MG: 2 INJECTION, SOLUTION INTRAVENOUS at 16:18

## 2025-01-15 RX ADMIN — CARBIDOPA AND LEVODOPA 1 TABLET: 25; 100 TABLET ORAL at 21:49

## 2025-01-15 RX ADMIN — SENNOSIDES 8.6 MG: 8.6 TABLET, FILM COATED ORAL at 09:29

## 2025-01-15 RX ADMIN — DOCUSATE SODIUM 100 MG: 100 CAPSULE, LIQUID FILLED ORAL at 17:58

## 2025-01-15 RX ADMIN — CEFEPIME HYDROCHLORIDE 2000 MG: 2 INJECTION, SOLUTION INTRAVENOUS at 05:05

## 2025-01-15 RX ADMIN — CARBIDOPA AND LEVODOPA 1 TABLET: 25; 100 TABLET ORAL at 09:29

## 2025-01-15 RX ADMIN — AMLODIPINE BESYLATE 5 MG: 5 TABLET ORAL at 09:29

## 2025-01-15 RX ADMIN — APIXABAN 5 MG: 5 TABLET, FILM COATED ORAL at 09:29

## 2025-01-15 NOTE — PROGRESS NOTES
"Progress Note - Urology      Patient: Pavan Edward   : 1935 Sex: male   MRN: [id number]     CSN: [id number]  Unit/Bed#: [address] [place] [address]     SUBJECTIVE:   Patient seen on morning rounds  Sparks catheter DC'd  Then voiding with 200 cc residuals on tamsulosin 0.4 mg no issues      Objective   Vitals: /69 (BP Location: Right arm)   Pulse 62   Temp 97.7 °F (36.5 °C) (Temporal)   Resp 18   Ht 6' 1\" (1.854 m)   Wt 81.6 kg (180 lb)   SpO2 97%   BMI 23.75 kg/m²     I/O last 24 hours:  In: -   Out: 1300 [Urine:1300]      Physical Exam:   General Alert awake   Normocephalic atraumatic PERRLA  Lungs clear bilaterally  Cardiac normal S1 normal S2  Abdomen soft, flank pain  Extremities no edema      Lab Results: CBC:   Lab Results   Component Value Date    WBC 5.06 01/15/2025    HGB 11.9 (L) 01/15/2025    HCT 36.8 01/15/2025    MCV 90 01/15/2025     01/15/2025    RBC 4.10 01/15/2025    MCH 29.0 01/15/2025    MCHC 32.3 01/15/2025    RDW 12.5 01/15/2025    MPV 11.1 01/15/2025    NRBC 0 01/15/2025     CMP:   Lab Results   Component Value Date     (H) 01/15/2025    CO2 25 01/15/2025    BUN 15 01/15/2025    CREATININE 0.85 01/15/2025    CALCIUM 8.2 (L) 01/15/2025    AST 8 (L) 2025    ALT 9 2025    ALKPHOS 40 2025    EGFR 77 01/15/2025     Urinalysis:   Lab Results   Component Value Date    COLORU Yellow 01/10/2025    CLARITYU Clear 01/10/2025    SPECGRAV 1.025 01/10/2025    PHUR 6.5 01/10/2025    LEUKOCYTESUR Large (A) 01/10/2025    NITRITE Positive (A) 01/10/2025    GLUCOSEU Negative 01/10/2025    KETONESU Negative 01/10/2025    BILIRUBINUR Negative 01/10/2025    BLOODU Large (A) 01/10/2025     Urine Culture:   Lab Results   Component Value Date    URINECX >100,000 cfu/ml Chryseobacterium indologenes (A) 01/10/2025    URINECX >100,000 cfu/ml Pseudomonas aeruginosa (A) 01/10/2025    URINECX >100,000 cfu/ml Enterococcus species (A) 01/10/2025     PSA: No results found for: " "\"PSA\"      Assessment/ Plan:  Urinary retention  On tamsulosin 0.4 mg Sparks catheter removed this morning patient voiding  Postvoid residual 200 cc acceptable  Continue voiding trial  Complicated UTI bacteremia secondary to Pseudomonas  Continue IV cefepime          Oliver Cherry MD  "

## 2025-01-15 NOTE — PLAN OF CARE
Problem: Potential for Falls  Goal: Patient will remain free of falls  Description: INTERVENTIONS:  - Educate patient/family on patient safety including physical limitations  - Instruct patient to call for assistance with activity   - Consult OT/PT to assist with strengthening/mobility   - Keep Call bell within reach  - Keep bed low and locked with side rails adjusted as appropriate  - Keep care items and personal belongings within reach  - Initiate and maintain comfort rounds  - Make Fall Risk Sign visible to staff  - Offer Toileting every 2 Hours, in advance of need  - Initiate/Maintain bed alarm  - Obtain necessary fall risk management equipment: call bell  - Apply yellow socks and bracelet for high fall risk patients  - Consider moving patient to room near nurses station  Outcome: Progressing     Problem: PAIN - ADULT  Goal: Verbalizes/displays adequate comfort level or baseline comfort level  Description: Interventions:  - Encourage patient to monitor pain and request assistance  - Assess pain using appropriate pain scale  - Administer analgesics based on type and severity of pain and evaluate response  - Implement non-pharmacological measures as appropriate and evaluate response  - Consider cultural and social influences on pain and pain management  - Notify physician/advanced practitioner if interventions unsuccessful or patient reports new pain  Outcome: Progressing     Problem: INFECTION - ADULT  Goal: Absence or prevention of progression during hospitalization  Description: INTERVENTIONS:  - Assess and monitor for signs and symptoms of infection  - Monitor lab/diagnostic results  - Monitor all insertion sites, i.e. indwelling lines, tubes, and drains  - Monitor endotracheal if appropriate and nasal secretions for changes in amount and color  - [place] appropriate cooling/warming therapies per order  - Administer medications as ordered  - Instruct and encourage patient and family to use good hand  hygiene technique  - Identify and instruct in appropriate isolation precautions for identified infection/condition  Outcome: Progressing  Goal: Absence of fever/infection during neutropenic period  Description: INTERVENTIONS:  - Monitor WBC    Outcome: Progressing     Problem: SAFETY ADULT  Goal: Maintain or return to baseline ADL function  Description: INTERVENTIONS:  -  Assess patient's ability to carry out ADLs; assess patient's baseline for ADL function and identify physical deficits which impact ability to perform ADLs (bathing, care of mouth/teeth, toileting, grooming, dressing, etc.)  - Assess/evaluate cause of self-care deficits   - Assess range of motion  - Assess patient's mobility; develop plan if impaired  - Assess patient's need for assistive devices and provide as appropriate  - Encourage maximum independence but intervene and supervise when necessary  - Involve family in performance of ADLs  - Assess for home care needs following discharge   - Consider OT consult to assist with ADL evaluation and planning for discharge  - Provide patient education as appropriate  Outcome: Progressing  Goal: Maintains/Returns to pre admission functional level  Description: INTERVENTIONS:  - Perform AM-PAC 6 Click Basic Mobility/ Daily Activity assessment daily.  - Set and communicate daily mobility goal to care team and patient/family/caregiver.   - Collaborate with rehabilitation services on mobility goals if consulted  3- Out of bed to chair 3 times a day   - Out of bed for meals 3 times a day  - Out of bed for toileting  - Record patient progress and toleration of activity level   Outcome: Progressing     Problem: DISCHARGE PLANNING  Goal: Discharge to home or other facility with appropriate resources  Description: INTERVENTIONS:  - Identify barriers to discharge w/patient and caregiver  - Arrange for needed discharge resources and transportation as appropriate  - Identify discharge learning needs (meds, wound care,  etc.)  - Arrange for interpretive services to assist at discharge as needed  - Refer to Case Management Department for coordinating discharge planning if the patient needs post-hospital services based on physician/advanced practitioner order or complex needs related to functional status, cognitive ability, or social support system  Outcome: Progressing     Problem: Knowledge Deficit  Goal: Patient/family/caregiver demonstrates understanding of disease process, treatment plan, medications, and discharge instructions  Description: Complete learning assessment and assess knowledge base.  Interventions:  - Provide teaching at level of understanding  - Provide teaching via preferred learning methods  Outcome: Progressing     Problem: Prexisting or High Potential for Compromised Skin Integrity  Goal: Skin integrity is maintained or improved  Description: INTERVENTIONS:  - Identify patients at risk for skin breakdown  - Assess and monitor skin integrity  - Assess and monitor nutrition and hydration status  - Monitor labs   - Assess for incontinence   - Turn and reposition patient  - Assist with mobility/ambulation  - Relieve pressure over bony prominences  - Avoid friction and shearing  - Provide appropriate hygiene as needed including keeping skin clean and dry  - Evaluate need for skin moisturizer/barrier cream  - Collaborate with interdisciplinary team   - Patient/family teaching  - Consider wound care consult   Outcome: Progressing     Problem: Nutrition/Hydration-ADULT  Goal: Nutrient/Hydration intake appropriate for improving, restoring or maintaining nutritional needs  Description: Monitor and assess patient's nutrition/hydration status for malnutrition. Collaborate with interdisciplinary team and initiate plan and interventions as ordered.  Monitor patient's weight and dietary intake as ordered or per policy. Utilize nutrition screening tool and intervene as necessary. Determine patient's food preferences and  provide high-protein, high-caloric foods as appropriate.     INTERVENTIONS:  - Monitor oral intake, urinary output, labs, and treatment plans  - Assess nutrition and hydration status and recommend course of action  - Evaluate amount of meals eaten  - Assist patient with eating if necessary   - Allow adequate time for meals  - Recommend/ encourage appropriate diets, oral nutritional supplements, and vitamin/mineral supplements  - Order, calculate, and assess calorie counts as needed  - Recommend, monitor, and adjust tube feedings and TPN/PPN based on assessed needs  - Assess need for intravenous fluids  - Provide specific nutrition/hydration education as appropriate  - Include patient/family/caregiver in decisions related to nutrition  Outcome: Progressing

## 2025-01-15 NOTE — ASSESSMENT & PLAN NOTE
1 of 2 sets admission blood cultures is growing Pseudomonas aeruginosa. Due to a urinary source; UA shows pyuria and urine culture is also growing Pseudomonas as well as Chyyseobacterium indologenes.  CT A/P shows perivesicular fat stranding, bladder wall thickening and enhancement suggesting cystitis. WBC count has normalized.  The patient is hemodynamically stable.  No intravascular prosthetic devices. TTE no vegetations.  Repeat blood cultures no growth to date.   -Continue IV cefepime 2g every 12 hours   -Follow-up repeat blood cultures   -Will avoid fluoroquinolones due to patient's iliac artery aneurysm, age   -Recommend a 10-day total course of IV cefepime, through 1/21/2025   -Okay for PICC or midline placement due to short remaining duration of antibiotics, unless the rehab facility is able to provide antibiotics via a peripheral IV   -Monitor CBC, CMP to assess for treatment response and drug toxicity   -No formal ID follow-up needed

## 2025-01-15 NOTE — PROGRESS NOTES
Progress Note - Hospitalist   Name: Pavan Edward 89 y.o. male I MRN: [id number]  Unit/Bed#: 3 [address] I Date of Admission: 1/10/2025   Date of Service: 1/15/2025 I Hospital Day: 5    Assessment & Plan  Urinary tract infection associated with indwelling urethral catheter  (HCC)  In the setting of recurrent Sparks catheter insertion and removal with recent hematuria  Urine cultures positive for Pseudomonas aeruginosa, Enterococcus and Chryseobacterium indologene  Continue IV cefepime renally dosed  through 1/21 per discussion with ID  Voiding trial today  Urology and ID following.  Input noted  Complicated UTI (urinary tract infection)  See plan above  Bacteremia due to Pseudomonas  Urinary source  Continue IV cefepime as above  PICC line placement vs PIV depending on facility preference.     Gross hematuria  Urinary retention with traumatic Sparks catheter insertion/removal  Urine is clearing  Urology following, recommending voiding trials with Sparks catheter removal 1/15  Hemoglobin is stable.  Continue to monitor while on Eliquis and Brilinta  Continue Flomax  CAD (coronary artery disease)  Follow with cardiology  Denies chest pain or palpations   S/p PCI x2 on 8/13/2023  Brilinta resumed  Continue atorvastatin     Hypertension  Blood pressure reviewed and is elevated  Continue amlodipine 5 mg daily and increase lisinopril to 10 mg daily  Paroxysmal atrial fibrillation (HCC)  Rates are controlled.  Not on rate control medications  AC with Eliquis  Parkinson's disease (HCC)  Chronic   Home medication carbidopa-levodopa continued  PT OT consulted.  Recommending STR  PAD (peripheral artery disease) (HCC)  Followed by vascular surgery  Continue atorvastatin, Brilinta and Eliquis    Acute metabolic encephalopathy  CT head with no acute intracranial process.  Appears back to baseline      VTE Pharmacologic Prophylaxis:   Moderate Risk (Score 3-4) - Pharmacological DVT Prophylaxis Ordered: apixaban  (Eliquis).    Mobility:   Basic Mobility Inpatient Raw Score: 14  JH-HLM Goal: 4: Move to chair/commode  JH-HLM Achieved: 4: Move to chair/commode  JH-HLM Goal achieved. Continue to encourage appropriate mobility.    Patient Centered Rounds: I performed bedside rounds with nursing staff today.   Discussions with Specialists or Other Care Team Provider: Infectious disease    Education and Discussions with Family / Patient: Updated  (wife) via phone.    Current Length of Stay: 5 day(s)  Current Patient Status: Inpatient   Certification Statement: The patient will continue to require additional inpatient hospital stay due to Finalizing blood cultures  Discharge Plan: Anticipate discharge in 24-48 hrs to rehab facility.    Code Status: Level 3 - DNAR and DNI    Subjective   Doing well. No acute complaints.     Objective :  Temp:  [97.8 °F (36.6 °C)-98.3 °F (36.8 °C)] 98.3 °F (36.8 °C)  HR:  [64-74] 74  BP: (178)/(79-90) 178/90  Resp:  [17] 17  SpO2:  [93 %-97 %] 97 %  O2 Device: None (Room air)    Body mass index is 23.75 kg/m².     Input and Output Summary (last 24 hours):     Intake/Output Summary (Last 24 hours) at 1/15/2025 1205  Last data filed at 1/15/2025 0700  Gross per 24 hour   Intake --   Output 1300 ml   Net -1300 ml       Physical Exam  Vitals and nursing note reviewed.   Constitutional:       General: He is not in acute distress.     Appearance: He is well-developed.   HENT:      Head: Normocephalic and atraumatic.   Eyes:      Conjunctiva/sclera: Conjunctivae normal.   Cardiovascular:      Rate and Rhythm: Normal rate and regular rhythm.      Heart sounds: No murmur heard.  Pulmonary:      Effort: Pulmonary effort is normal. No respiratory distress.      Breath sounds: Normal breath sounds.   Abdominal:      Palpations: Abdomen is soft.      Tenderness: There is no abdominal tenderness.   Musculoskeletal:         General: No swelling.      Cervical back: Neck supple.   Skin:     General:  Skin is warm and dry.      Capillary Refill: Capillary refill takes less than 2 seconds.   Neurological:      Mental Status: He is alert.   Psychiatric:         Mood and Affect: Mood normal.           Lines/Drains:  Lines/Drains/Airways       Active Status       Name Placement date Placement time Site Days    Urethral Catheter Latex 16 Fr. 12/25/24  0643  Latex  21                  Urinary Catheter:  Goal for removal: No longer needed. Will place order to discontinue                 Lab Results: I have reviewed the following results:   Results from last 7 days   Lab Units 01/15/25  0513   WBC Thousand/uL 5.06   HEMOGLOBIN g/dL 11.9*   HEMATOCRIT % 36.8   PLATELETS Thousands/uL 196   SEGS PCT % 68   LYMPHO PCT % 13*   MONO PCT % 12   EOS PCT % 5     Results from last 7 days   Lab Units 01/15/25  0513 01/14/25  0458   SODIUM mmol/L 141 143   POTASSIUM mmol/L 4.0 4.5   CHLORIDE mmol/L 115* 109*   CO2 mmol/L 25 25   BUN mg/dL 15 17   CREATININE mg/dL 0.85 0.91   ANION GAP mmol/L 1* 9   CALCIUM mg/dL 8.2* 7.7*   ALBUMIN g/dL  --  3.0*   TOTAL BILIRUBIN mg/dL  --  0.62   ALK PHOS U/L  --  40   ALT U/L  --  9   AST U/L  --  8*   GLUCOSE RANDOM mg/dL 80 77         Results from last 7 days   Lab Units 01/13/25  0712 01/10/25  1909   POC GLUCOSE mg/dl 104 131         Results from last 7 days   Lab Units 01/14/25  0458 01/13/25  0604 01/10/25  2210   LACTIC ACID mmol/L  --   --  0.6   PROCALCITONIN ng/ml 1.43* 2.05*  --        Recent Cultures (last 7 days):   Results from last 7 days   Lab Units 01/13/25  1331 01/10/25  2025 01/10/25  1944   BLOOD CULTURE  No Growth at 24 hrs.  No Growth at 24 hrs. No Growth After 4 Days.  Pseudomonas aeruginosa*  --    GRAM STAIN RESULT   --  Gram negative rods*  --    URINE CULTURE   --   --  >100,000 cfu/ml Chryseobacterium indologenes*  >100,000 cfu/ml Pseudomonas aeruginosa*  >100,000 cfu/ml Enterococcus species*       Imaging Results Review: I reviewed radiology reports from this  admission including: CT abdomen/pelvis.  Other Study Results Review: No additional pertinent studies reviewed.    Last 24 Hours Medication List:     Current Facility-Administered Medications:     acetaminophen (TYLENOL) tablet 650 mg, Q6H PRN    amLODIPine (NORVASC) tablet 5 mg, Daily    apixaban (ELIQUIS) tablet 5 mg, BID    carbidopa-levodopa (SINEMET)  mg per tablet 1 tablet, TID    cefepime (MAXIPIME) IVPB (premix in dextrose) 2,000 mg 50 mL, Q12H, Last Rate: 2,000 mg (01/15/25 0505)    Cholecalciferol (VITAMIN D3) tablet 2,000 Units, Daily    docusate sodium (COLACE) capsule 100 mg, BID    gabapentin (NEURONTIN) capsule 100 mg, HS    levothyroxine tablet 100 mcg, Early Morning    [START ON 1/16/2025] lisinopril (ZESTRIL) tablet 10 mg, Daily    ondansetron (ZOFRAN) injection 4 mg, Q6H PRN    polyethylene glycol (MIRALAX) packet 17 g, Daily PRN    polyethylene glycol (MIRALAX) packet 17 g, HS    senna (SENOKOT) tablet 8.6 mg, BID    sodium chloride 0.9 % infusion, Continuous, Last Rate: 75 mL/hr (01/14/25 2519)    tamsulosin (FLOMAX) capsule 0.4 mg, Daily With Dinner    ticagrelor tablet 60 mg, Q12H SAM    Administrative Statements   Today, Patient Was Seen By: Khoi Gardiner MD  I have spent a total time of 25 minutes in caring for this patient on the day of the visit/encounter including Diagnostic results, Documenting in the medical record, Reviewing / ordering tests, medicine, procedures  , Obtaining or reviewing history  , and Communicating with other healthcare professionals .    **Please Note: This note may have been constructed using a voice recognition system.**

## 2025-01-15 NOTE — ASSESSMENT & PLAN NOTE
In the setting of recurrent Sparks catheter insertion and removal with recent hematuria  Urine cultures positive for Pseudomonas aeruginosa, Enterococcus and Chryseobacterium indologene  Continue IV cefepime renally dosed  through 1/21 per discussion with ID  Voiding trial today  Urology and ID following.  Input noted

## 2025-01-15 NOTE — ASSESSMENT & PLAN NOTE
In the setting of acute infection.  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 Mental status improving   -Monitor mental status

## 2025-01-15 NOTE — ASSESSMENT & PLAN NOTE
Blood pressure reviewed and is elevated  Continue amlodipine 5 mg daily and increase lisinopril to 10 mg daily

## 2025-01-15 NOTE — ASSESSMENT & PLAN NOTE
Urinary retention with traumatic Sparks catheter insertion/removal  Urine is clearing  Urology following, recommending voiding trials with Sparks catheter removal 1/15  Hemoglobin is stable.  Continue to monitor while on Eliquis and Brilinta  Continue Flomax

## 2025-01-15 NOTE — PROGRESS NOTES
Progress Note - Infectious Disease   Name: Pavan Edward 89 y.o. male I MRN: [id number]  Unit/Bed#: 3 [address] I Date of Admission: 1/10/2025   Date of Service: 1/15/2025 I Hospital Day: 5    Assessment & Plan  Bacteremia due to Pseudomonas  1 of 2 sets admission blood cultures is growing Pseudomonas aeruginosa. Due to a urinary source; UA shows pyuria and urine culture is also growing Pseudomonas as well as Chyyseobacterium indologenes.  CT A/P shows perivesicular fat stranding, bladder wall thickening and enhancement suggesting cystitis. WBC count has normalized.  The patient is hemodynamically stable.  No intravascular prosthetic devices. TTE no vegetations.  Repeat blood cultures no growth to date.   -Continue IV cefepime 2g every 12 hours   -Follow-up repeat blood cultures   -Will avoid fluoroquinolones due to patient's iliac artery aneurysm, age   -Recommend a 10-day total course of IV cefepime, through 1/21/2025   -Okay for PICC or midline placement due to short remaining duration of antibiotics, unless the rehab facility is able to provide antibiotics via a peripheral IV   -Monitor CBC, CMP to assess for treatment response and drug toxicity   -No formal ID follow-up needed  Complicated UTI (urinary tract infection)  In the setting of recent hematuria, Sparks catheter insertion/removal over the past few weeks.  UA with pyuria although difficult to assess in the setting of the Sparks catheter.  Urine culture is growing Pseudomonas, 1 set blood culture is positive as well.  Urine culture also shows growth of Chryseobacterium indologenes and Enterococcus which represents urinary colonization. CT A/P suggestive of cystitis.   -Continue IV cefepime   -Urology following  Acute metabolic encephalopathy  In the setting of acute infection.  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 Mental status improving   -Monitor mental status  Parkinson's disease (HCC)  Continue home  medications per primary.  Paroxysmal atrial fibrillation (HCC)  Anticoagulation per primary.  Aortic dissection, abdominal (HCC)  Patient with a history of infrarenal aortic dissection, known iliac artery aneurysm.  Will avoid fluoroquinolones.    Above management plan to continue Cefepime discussed with Dr. Gardiner. Discussed with the patient and wife at bedside. ID will follow.    Antibiotics:  Cefepime day 4    Subjective   The patient states he is feeling much better today. He denies abdominal pain, nausea, diarrhea, fever, chills. Sparks catheter removed this monring for void trial.    Objective :  Temp:  [97.7 °F (36.5 °C)-98.3 °F (36.8 °C)] 97.7 °F (36.5 °C)  HR:  [62-74] 62  BP: (146-178)/(69-90) 146/69  Resp:  [18] 18  SpO2:  [96 %-97 %] 97 %  O2 Device: None (Room air)    General: chronically ill appearing but no acute distress, awake and alert  Pulmonary:  Normal respiratory excursion without accessory muscle use  Abdomen:  Soft, nontender  : Sparks catheter in place with yellow urine  Extremities:  No edema  Skin:  No rashes  Neuro: Upper extremity tremor      Lab Results: I have reviewed the following results:  Results from last 7 days   Lab Units 01/15/25  0513 01/14/25  0458 01/13/25  0604   WBC Thousand/uL 5.06 5.74 8.03   HEMOGLOBIN g/dL 11.9* 12.0 12.0   PLATELETS Thousands/uL 196 176 165     Results from last 7 days   Lab Units 01/15/25  0513 01/14/25  0458 01/13/25  0604 01/12/25  0714   SODIUM mmol/L 141 143 140 139   POTASSIUM mmol/L 4.0 4.5 3.8 4.4   CHLORIDE mmol/L 115* 109* 110* 108   CO2 mmol/L 25 25 24 25   BUN mg/dL 15 17 12 19   CREATININE mg/dL 0.85 0.91 0.76 0.84   EGFR ml/min/1.73sq m 77 74 80 77   CALCIUM mg/dL 8.2* 7.7* 8.2* 7.6*   AST U/L  --  8* 9* 8*   ALT U/L  --  9 4* 4*   ALK PHOS U/L  --  40 39 40   ALBUMIN g/dL  --  3.0* 3.0* 2.9*     Results from last 7 days   Lab Units 01/13/25  1331 01/10/25  2025 01/10/25  1944   BLOOD CULTURE  No Growth at 24 hrs.  No Growth at 24 hrs.  No Growth After 4 Days.  Pseudomonas aeruginosa*  --    GRAM STAIN RESULT   --  Gram negative rods*  --    URINE CULTURE   --   --  >100,000 cfu/ml Chryseobacterium indologenes*  >100,000 cfu/ml Pseudomonas aeruginosa*  >100,000 cfu/ml Enterococcus species*     Results from last 7 days   Lab Units 01/14/25  0458 01/13/25  0604   PROCALCITONIN ng/ml 1.43* 2.05*                 CT A/P reviewed and shows perivesicular stranding, bladder wall thickening

## 2025-01-15 NOTE — ASSESSMENT & PLAN NOTE
Urinary source  Continue IV cefepime as above  PICC line placement vs PIV depending on facility preference.

## 2025-01-15 NOTE — ASSESSMENT & PLAN NOTE
Follow with cardiology  Denies chest pain or palpations   S/p PCI x2 on 8/13/2023  Brilinta resumed  Continue atorvastatin

## 2025-01-15 NOTE — CASE MANAGEMENT
Case Management Progress Note    Patient name Pavan Edward  Location [address] [place] [address] MRN [id number]  : 1935 Date 1/15/2025       LOS (days): 5  Geometric Mean LOS (GMLOS) (days): 4.8  Days to GMLOS:0        OBJECTIVE:        Current admission status: Inpatient  Preferred Pharmacy:   [facility] [address] [place] [address] - [address]  [address]  [facility] [address]  Phone: [phone] Fax: [phone]    Primary Care Provider: Phil Major    Primary Insurance: [facility] REP  Secondary Insurance:     PROGRESS NOTE:    Family requesting referral be sent to [facility] as this is now facility of preference. Referral sent in [place]. Response pending.

## 2025-01-15 NOTE — PLAN OF CARE
Problem: Potential for Falls  Goal: Patient will remain free of falls  Description: INTERVENTIONS:  - Educate patient/family on patient safety including physical limitations  - Instruct patient to call for assistance with activity   - Consult OT/PT to assist with strengthening/mobility   - Keep Call bell within reach  - Keep bed low and locked with side rails adjusted as appropriate  - Keep care items and personal belongings within reach  - Initiate and maintain comfort rounds  - Make Fall Risk Sign visible to staff  - Offer Toileting every 2 Hours, in advance of need  - Initiate/Maintain bed alarm  - Obtain necessary fall risk management equipment:   - Apply yellow socks and bracelet for high fall risk patients  - Consider moving patient to room near nurses station  Outcome: Progressing     Problem: PAIN - ADULT  Goal: Verbalizes/displays adequate comfort level or baseline comfort level  Description: Interventions:  - Encourage patient to monitor pain and request assistance  - Assess pain using appropriate pain scale  - Administer analgesics based on type and severity of pain and evaluate response  - Implement non-pharmacological measures as appropriate and evaluate response  - Consider cultural and social influences on pain and pain management  - Notify physician/advanced practitioner if interventions unsuccessful or patient reports new pain  Outcome: Progressing     Problem: INFECTION - ADULT  Goal: Absence or prevention of progression during hospitalization  Description: INTERVENTIONS:  - Assess and monitor for signs and symptoms of infection  - Monitor lab/diagnostic results  - Monitor all insertion sites, i.e. indwelling lines, tubes, and drains  - Monitor endotracheal if appropriate and nasal secretions for changes in amount and color  - [place] appropriate cooling/warming therapies per order  - Administer medications as ordered  - Instruct and encourage patient and family to use good hand hygiene  technique  - Identify and instruct in appropriate isolation precautions for identified infection/condition  Outcome: Progressing  Goal: Absence of fever/infection during neutropenic period  Description: INTERVENTIONS:  - Monitor WBC    Outcome: Progressing     Problem: SAFETY ADULT  Goal: Maintain or return to baseline ADL function  Description: INTERVENTIONS:  -  Assess patient's ability to carry out ADLs; assess patient's baseline for ADL function and identify physical deficits which impact ability to perform ADLs (bathing, care of mouth/teeth, toileting, grooming, dressing, etc.)  - Assess/evaluate cause of self-care deficits   - Assess range of motion  - Assess patient's mobility; develop plan if impaired  - Assess patient's need for assistive devices and provide as appropriate  - Encourage maximum independence but intervene and supervise when necessary  - Involve family in performance of ADLs  - Assess for home care needs following discharge   - Consider OT consult to assist with ADL evaluation and planning for discharge  - Provide patient education as appropriate  Outcome: Progressing  Goal: Maintains/Returns to pre admission functional level  Description: INTERVENTIONS:  - Perform AM-PAC 6 Click Basic Mobility/ Daily Activity assessment daily.  - Set and communicate daily mobility goal to care team and patient/family/caregiver.   - Collaborate with rehabilitation services on mobility goals if consulted  - Perform Range of Motion 3 times a day.  - Reposition patient every 2 hours.  - Dangle patient 3 times a day  - Stand patient 3 times a day  - Ambulate patient 3 times a day  - Out of bed to chair 3 times a day   - Out of bed for meals 3 times a day  - Out of bed for toileting  - Record patient progress and toleration of activity level   Outcome: Progressing     Problem: DISCHARGE PLANNING  Goal: Discharge to home or other facility with appropriate resources  Description: INTERVENTIONS:  - Identify barriers  to discharge w/patient and caregiver  - Arrange for needed discharge resources and transportation as appropriate  - Identify discharge learning needs (meds, wound care, etc.)  - Arrange for interpretive services to assist at discharge as needed  - Refer to Case Management Department for coordinating discharge planning if the patient needs post-hospital services based on physician/advanced practitioner order or complex needs related to functional status, cognitive ability, or social support system  Outcome: Progressing     Problem: Knowledge Deficit  Goal: Patient/family/caregiver demonstrates understanding of disease process, treatment plan, medications, and discharge instructions  Description: Complete learning assessment and assess knowledge base.  Interventions:  - Provide teaching at level of understanding  - Provide teaching via preferred learning methods  Outcome: Progressing     Problem: Prexisting or High Potential for Compromised Skin Integrity  Goal: Skin integrity is maintained or improved  Description: INTERVENTIONS:  - Identify patients at risk for skin breakdown  - Assess and monitor skin integrity  - Assess and monitor nutrition and hydration status  - Monitor labs   - Assess for incontinence   - Turn and reposition patient  - Assist with mobility/ambulation  - Relieve pressure over bony prominences  - Avoid friction and shearing  - Provide appropriate hygiene as needed including keeping skin clean and dry  - Evaluate need for skin moisturizer/barrier cream  - Collaborate with interdisciplinary team   - Patient/family teaching  - Consider wound care consult   Outcome: Progressing     Problem: Nutrition/Hydration-ADULT  Goal: Nutrient/Hydration intake appropriate for improving, restoring or maintaining nutritional needs  Description: Monitor and assess patient's nutrition/hydration status for malnutrition. Collaborate with interdisciplinary team and initiate plan and interventions as ordered.  Monitor  patient's weight and dietary intake as ordered or per policy. Utilize nutrition screening tool and intervene as necessary. Determine patient's food preferences and provide high-protein, high-caloric foods as appropriate.     INTERVENTIONS:  - Monitor oral intake, urinary output, labs, and treatment plans  - Assess nutrition and hydration status and recommend course of action  - Evaluate amount of meals eaten  - Assist patient with eating if necessary   - Allow adequate time for meals  - Recommend/ encourage appropriate diets, oral nutritional supplements, and vitamin/mineral supplements  - Order, calculate, and assess calorie counts as needed  - Recommend, monitor, and adjust tube feedings and TPN/PPN based on assessed needs  - Assess need for intravenous fluids  - Provide specific nutrition/hydration education as appropriate  - Include patient/family/caregiver in decisions related to nutrition  Outcome: Progressing

## 2025-01-15 NOTE — ASSESSMENT & PLAN NOTE
In the setting of recent hematuria, Sparks catheter insertion/removal over the past few weeks.  UA with pyuria although difficult to assess in the setting of the Sparks catheter.  Urine culture is growing Pseudomonas, 1 set blood culture is positive as well.  Urine culture also shows growth of Chryseobacterium indologenes and Enterococcus which represents urinary colonization. CT A/P suggestive of cystitis.   -Continue IV cefepime   -Urology following

## 2025-01-16 LAB
ANION GAP SERPL CALCULATED.3IONS-SCNC: 1 MMOL/L (ref 4–13)
BUN SERPL-MCNC: 19 MG/DL (ref 5–25)
CALCIUM SERPL-MCNC: 8.6 MG/DL (ref 8.4–10.2)
CHLORIDE SERPL-SCNC: 112 MMOL/L (ref 96–108)
CO2 SERPL-SCNC: 27 MMOL/L (ref 21–32)
CREAT SERPL-MCNC: 0.93 MG/DL (ref 0.6–1.3)
ERYTHROCYTE [DISTWIDTH] IN BLOOD BY AUTOMATED COUNT: 12.5 % (ref 11.6–15.1)
GFR SERPL CREATININE-BSD FRML MDRD: 72 ML/MIN/1.73SQ M
GLUCOSE SERPL-MCNC: 87 MG/DL (ref 65–140)
HCT VFR BLD AUTO: 36.1 % (ref 36.5–49.3)
HGB BLD-MCNC: 11.8 G/DL (ref 12–17)
MCH RBC QN AUTO: 29.1 PG (ref 26.8–34.3)
MCHC RBC AUTO-ENTMCNC: 32.7 G/DL (ref 31.4–37.4)
MCV RBC AUTO: 89 FL (ref 82–98)
PLATELET # BLD AUTO: 211 THOUSANDS/UL (ref 149–390)
PMV BLD AUTO: 11.1 FL (ref 8.9–12.7)
POTASSIUM SERPL-SCNC: 3.8 MMOL/L (ref 3.5–5.3)
RBC # BLD AUTO: 4.06 MILLION/UL (ref 3.88–5.62)
SODIUM SERPL-SCNC: 140 MMOL/L (ref 135–147)
WBC # BLD AUTO: 6.31 THOUSAND/UL (ref 4.31–10.16)

## 2025-01-16 PROCEDURE — 80048 BASIC METABOLIC PNL TOTAL CA: CPT | Performed by: INTERNAL MEDICINE

## 2025-01-16 PROCEDURE — 97110 THERAPEUTIC EXERCISES: CPT

## 2025-01-16 PROCEDURE — 99233 SBSQ HOSP IP/OBS HIGH 50: CPT | Performed by: STUDENT IN AN ORGANIZED HEALTH CARE EDUCATION/TRAINING PROGRAM

## 2025-01-16 PROCEDURE — G0545 PR INHERENT VISIT TO INPT: HCPCS | Performed by: STUDENT IN AN ORGANIZED HEALTH CARE EDUCATION/TRAINING PROGRAM

## 2025-01-16 PROCEDURE — 85027 COMPLETE CBC AUTOMATED: CPT | Performed by: INTERNAL MEDICINE

## 2025-01-16 RX ADMIN — CHOLECALCIFEROL (VITAMIN D3) 10 MCG (400 UNIT) TABLET 2000 UNITS: at 10:02

## 2025-01-16 RX ADMIN — TAMSULOSIN HYDROCHLORIDE 0.4 MG: 0.4 CAPSULE ORAL at 16:28

## 2025-01-16 RX ADMIN — LEVOTHYROXINE SODIUM 100 MCG: 100 TABLET ORAL at 05:01

## 2025-01-16 RX ADMIN — TICAGRELOR 60 MG: 60 TABLET ORAL at 21:30

## 2025-01-16 RX ADMIN — LISINOPRIL 10 MG: 10 TABLET ORAL at 10:02

## 2025-01-16 RX ADMIN — APIXABAN 5 MG: 5 TABLET, FILM COATED ORAL at 17:40

## 2025-01-16 RX ADMIN — TICAGRELOR 60 MG: 60 TABLET ORAL at 10:05

## 2025-01-16 RX ADMIN — CARBIDOPA AND LEVODOPA 1 TABLET: 25; 100 TABLET ORAL at 16:28

## 2025-01-16 RX ADMIN — APIXABAN 5 MG: 5 TABLET, FILM COATED ORAL at 10:02

## 2025-01-16 RX ADMIN — GABAPENTIN 100 MG: 100 CAPSULE ORAL at 21:30

## 2025-01-16 RX ADMIN — AMLODIPINE BESYLATE 5 MG: 5 TABLET ORAL at 10:02

## 2025-01-16 RX ADMIN — CARBIDOPA AND LEVODOPA 1 TABLET: 25; 100 TABLET ORAL at 10:02

## 2025-01-16 RX ADMIN — CARBIDOPA AND LEVODOPA 1 TABLET: 25; 100 TABLET ORAL at 21:30

## 2025-01-16 RX ADMIN — CEFEPIME HYDROCHLORIDE 2000 MG: 2 INJECTION, SOLUTION INTRAVENOUS at 16:29

## 2025-01-16 RX ADMIN — CEFEPIME HYDROCHLORIDE 2000 MG: 2 INJECTION, SOLUTION INTRAVENOUS at 04:43

## 2025-01-16 NOTE — PLAN OF CARE
Problem: Potential for Falls  Goal: Patient will remain free of falls  Description: INTERVENTIONS:  - Educate patient/family on patient safety including physical limitations  - Instruct patient to call for assistance with activity   - Consult OT/PT to assist with strengthening/mobility   - Keep Call bell within reach  - Keep bed low and locked with side rails adjusted as appropriate  - Keep care items and personal belongings within reach  - Initiate and maintain comfort rounds  - Make Fall Risk Sign visible to staff  - Obtain necessary fall risk management equipment:  - Apply yellow socks and bracelet for high fall risk patients  - Consider moving patient to room near nurses station  Outcome: Progressing     Problem: PAIN - ADULT  Goal: Verbalizes/displays adequate comfort level or baseline comfort level  Description: Interventions:  - Encourage patient to monitor pain and request assistance  - Assess pain using appropriate pain scale  - Administer analgesics based on type and severity of pain and evaluate response  - Implement non-pharmacological measures as appropriate and evaluate response  - Consider cultural and social influences on pain and pain management  - Notify physician/advanced practitioner if interventions unsuccessful or patient reports new pain  Outcome: Progressing     Problem: INFECTION - ADULT  Goal: Absence or prevention of progression during hospitalization  Description: INTERVENTIONS:  - Assess and monitor for signs and symptoms of infection  - Monitor lab/diagnostic results  - Monitor all insertion sites, i.e. indwelling lines, tubes, and drains  - Monitor endotracheal if appropriate and nasal secretions for changes in amount and color  - [place] appropriate cooling/warming therapies per order  - Administer medications as ordered  - Instruct and encourage patient and family to use good hand hygiene technique  - Identify and instruct in appropriate isolation precautions for identified  infection/condition  Outcome: Progressing  Goal: Absence of fever/infection during neutropenic period  Description: INTERVENTIONS:  - Monitor WBC    Outcome: Progressing     Problem: SAFETY ADULT  Goal: Maintain or return to baseline ADL function  Description: INTERVENTIONS:  -  Assess patient's ability to carry out ADLs; assess patient's baseline for ADL function and identify physical deficits which impact ability to perform ADLs (bathing, care of mouth/teeth, toileting, grooming, dressing, etc.)  - Assess/evaluate cause of self-care deficits   - Assess range of motion  - Assess patient's mobility; develop plan if impaired  - Assess patient's need for assistive devices and provide as appropriate  - Encourage maximum independence but intervene and supervise when necessary  - Involve family in performance of ADLs  - Assess for home care needs following discharge   - Consider OT consult to assist with ADL evaluation and planning for discharge  - Provide patient education as appropriate  Outcome: Progressing  Goal: Maintains/Returns to pre admission functional level  Description: INTERVENTIONS:  - Perform AM-PAC 6 Click Basic Mobility/ Daily Activity assessment daily.  - Set and communicate daily mobility goal to care team and patient/family/caregiver.   - Collaborate with rehabilitation services on mobility goals if consulted  - Perform Range of Motion   - Reposition patient every   - Dangle patient   - Stand patient   - Ambulate patient   - Out of bed to chair   - Out of bed for meals   - Out of bed for toileting  - Record patient progress and toleration of activity level   Outcome: Progressing     Problem: DISCHARGE PLANNING  Goal: Discharge to home or other facility with appropriate resources  Description: INTERVENTIONS:  - Identify barriers to discharge w/patient and caregiver  - Arrange for needed discharge resources and transportation as appropriate  - Identify discharge learning needs (meds, wound care,  etc.)  - Arrange for interpretive services to assist at discharge as needed  - Refer to Case Management Department for coordinating discharge planning if the patient needs post-hospital services based on physician/advanced practitioner order or complex needs related to functional status, cognitive ability, or social support system  Outcome: Progressing     Problem: Knowledge Deficit  Goal: Patient/family/caregiver demonstrates understanding of disease process, treatment plan, medications, and discharge instructions  Description: Complete learning assessment and assess knowledge base.  Interventions:  - Provide teaching at level of understanding  - Provide teaching via preferred learning methods  Outcome: Progressing     Problem: Prexisting or High Potential for Compromised Skin Integrity  Goal: Skin integrity is maintained or improved  Description: INTERVENTIONS:  - Identify patients at risk for skin breakdown  - Assess and monitor skin integrity  - Assess and monitor nutrition and hydration status  - Monitor labs   - Assess for incontinence   - Turn and reposition patient  - Assist with mobility/ambulation  - Relieve pressure over bony prominences  - Avoid friction and shearing  - Provide appropriate hygiene as needed including keeping skin clean and dry  - Evaluate need for skin moisturizer/barrier cream  - Collaborate with interdisciplinary team   - Patient/family teaching  - Consider wound care consult   Outcome: Progressing     Problem: Nutrition/Hydration-ADULT  Goal: Nutrient/Hydration intake appropriate for improving, restoring or maintaining nutritional needs  Description: Monitor and assess patient's nutrition/hydration status for malnutrition. Collaborate with interdisciplinary team and initiate plan and interventions as ordered.  Monitor patient's weight and dietary intake as ordered or per policy. Utilize nutrition screening tool and intervene as necessary. Determine patient's food preferences and  provide high-protein, high-caloric foods as appropriate.     INTERVENTIONS:  - Monitor oral intake, urinary output, labs, and treatment plans  - Assess nutrition and hydration status and recommend course of action  - Evaluate amount of meals eaten  - Assist patient with eating if necessary   - Allow adequate time for meals  - Recommend/ encourage appropriate diets, oral nutritional supplements, and vitamin/mineral supplements  - Order, calculate, and assess calorie counts as needed  - Recommend, monitor, and adjust tube feedings and TPN/PPN based on assessed needs  - Assess need for intravenous fluids  - Provide specific nutrition/hydration education as appropriate  - Include patient/family/caregiver in decisions related to nutrition  Outcome: Progressing

## 2025-01-16 NOTE — PHYSICAL THERAPY NOTE
PT TREATMENT     01/16/25 1155   PT Last Visit   PT Visit Date 01/16/25   Note Type   Note Type Treatment   Pain Assessment   Pain Assessment Tool 0-10   Pain Score No Pain   Restrictions/Precautions   Other Precautions Cognitive;Chair Alarm;Bed Alarm;Fall Risk  (falt affect)   General   Chart Reviewed Yes   Family/Caregiver Present Yes  (wife and son present)   Cognition   Overall Cognitive Status Impaired   Arousal/Participation Cooperative   Comments during gait activity, patient fatigued, became less verbal. BP taken in sit (123/58) and then again in stand (97/53) with patient reporting dizziness.   Subjective   Subjective patient reports feeling tired with walking   Bed Mobility   Additional Comments patient sitting OOB in bedside chair upon arrival by therapist   Transfers   Sit to Stand 3  Moderate assistance   Additional items Assist x 1   Stand to Sit 3  Moderate assistance   Additional items Assist x 1   Toilet transfer 3  Moderate assistance   Additional items Assist x 1;Commode  (patient incontinent of urine and required small BM. patient required max assist for toileting hygiene)   Ambulation/Elevation   Gait Assistance 3  Moderate assist   Additional items Assist x 1;Verbal cues;Tactile cues   Assistive Device Rolling walker   Distance 40 feet with flexed knees at times and head downward postiitioning. Shortened, shuffling type step patterning and patient fatigued with activity with dizziness and decreased responsiveness. Nursing present and patient recovered with rest.   Balance   Static Sitting Fair   Dynamic Sitting Fair -   Static Standing Poor +   Dynamic Standing Poor +   Ambulatory Poor +   Activity Tolerance   Activity Tolerance Patient limited by fatigue  (limited by weakness)   Nurse Made Aware yes   Exercises   Hip Flexion Sitting;10 reps;Bilateral  (alternating)   Knee AROM Long Arc Quad Sitting;5 reps;Bilateral  (alternating)   Ankle Pumps Sitting;10 reps;Bilateral   Balance training   sidestepping and backward stepping completed for balance and coordination   Assessment   Assessment patient cooperative, following one step commands and demonstrating improving gait balance and endurance. Patient will benefit from continued PT with progression as tolerated and increasing functional mobility with clinical staff as well. The patient's [facility] Basic Mobility Inpatient Short Form Raw Score is 13. A Raw score of less than or equal to 16 suggests the patient may benefit from discharge to post-acute rehabilitation services. Please also refer to the recommendation of the Physical Therapist for safe discharge planning.         Plan   Treatment/Interventions ADL retraining;Functional transfer training;LE strengthening/ROM;Therapeutic exercise;Endurance training;Cognitive reorientation;Patient/family training;Equipment eval/education;Bed mobility;Gait training;Compensatory technique education   PT Frequency 3-5x/wk   Discharge Recommendation   Rehab Resource Intensity Level, PT II (Moderate Resource Intensity)   AM-PAC Basic Mobility Inpatient   Turning in Flat Bed Without Bedrails 3   Lying on Back to Sitting on Edge of Flat Bed Without Bedrails 3   Moving Bed to Chair 2   Standing Up From Chair Using Arms 2   Walk in Room 2   Climb 3-5 Stairs With Railing 1   Basic Mobility Inpatient Raw Score 13   Basic Mobility Standardized Score 33.99   [facility] Highest Level Of Mobility   -HLM Goal 4: Move to chair/commode   -HLM Achieved 7: Walk 25 feet or more   Education   Patient Reinforcement needed   Licensure   [place] License Number  Lo Byrd PT [id number]

## 2025-01-16 NOTE — PLAN OF CARE
Problem: PAIN - ADULT  Goal: Verbalizes/displays adequate comfort level or baseline comfort level  Description: Interventions:  - Encourage patient to monitor pain and request assistance  - Assess pain using appropriate pain scale  - Administer analgesics based on type and severity of pain and evaluate response  - Implement non-pharmacological measures as appropriate and evaluate response  - Consider cultural and social influences on pain and pain management  - Notify physician/advanced practitioner if interventions unsuccessful or patient reports new pain  Outcome: Progressing     Problem: INFECTION - ADULT  Goal: Absence or prevention of progression during hospitalization  Description: INTERVENTIONS:  - Assess and monitor for signs and symptoms of infection  - Monitor lab/diagnostic results  - Monitor all insertion sites, i.e. indwelling lines, tubes, and drains  - Monitor endotracheal if appropriate and nasal secretions for changes in amount and color  - [place] appropriate cooling/warming therapies per order  - Administer medications as ordered  - Instruct and encourage patient and family to use good hand hygiene technique  - Identify and instruct in appropriate isolation precautions for identified infection/condition  Outcome: Progressing  Goal: Absence of fever/infection during neutropenic period  Description: INTERVENTIONS:  - Monitor WBC    Outcome: Progressing     Problem: SAFETY ADULT  Goal: Maintain or return to baseline ADL function  Description: INTERVENTIONS:  -  Assess patient's ability to carry out ADLs; assess patient's baseline for ADL function and identify physical deficits which impact ability to perform ADLs (bathing, care of mouth/teeth, toileting, grooming, dressing, etc.)  - Assess/evaluate cause of self-care deficits   - Assess range of motion  - Assess patient's mobility; develop plan if impaired  - Assess patient's need for assistive devices and provide as appropriate  - Encourage  maximum independence but intervene and supervise when necessary  - Involve family in performance of ADLs  - Assess for home care needs following discharge   - Consider OT consult to assist with ADL evaluation and planning for discharge  - Provide patient education as appropriate  Outcome: Progressing     Problem: DISCHARGE PLANNING  Goal: Discharge to home or other facility with appropriate resources  Description: INTERVENTIONS:  - Identify barriers to discharge w/patient and caregiver  - Arrange for needed discharge resources and transportation as appropriate  - Identify discharge learning needs (meds, wound care, etc.)  - Arrange for interpretive services to assist at discharge as needed  - Refer to Case Management Department for coordinating discharge planning if the patient needs post-hospital services based on physician/advanced practitioner order or complex needs related to functional status, cognitive ability, or social support system  Outcome: Progressing     Problem: Knowledge Deficit  Goal: Patient/family/caregiver demonstrates understanding of disease process, treatment plan, medications, and discharge instructions  Description: Complete learning assessment and assess knowledge base.  Interventions:  - Provide teaching at level of understanding  - Provide teaching via preferred learning methods  Outcome: Progressing     Problem: Prexisting or High Potential for Compromised Skin Integrity  Goal: Skin integrity is maintained or improved  Description: INTERVENTIONS:  - Identify patients at risk for skin breakdown  - Assess and monitor skin integrity  - Assess and monitor nutrition and hydration status  - Monitor labs   - Assess for incontinence   - Turn and reposition patient  - Assist with mobility/ambulation  - Relieve pressure over bony prominences  - Avoid friction and shearing  - Provide appropriate hygiene as needed including keeping skin clean and dry  - Evaluate need for skin moisturizer/barrier  cream  - Collaborate with interdisciplinary team   - Patient/family teaching  - Consider wound care consult   Outcome: Progressing     Problem: Nutrition/Hydration-ADULT  Goal: Nutrient/Hydration intake appropriate for improving, restoring or maintaining nutritional needs  Description: Monitor and assess patient's nutrition/hydration status for malnutrition. Collaborate with interdisciplinary team and initiate plan and interventions as ordered.  Monitor patient's weight and dietary intake as ordered or per policy. Utilize nutrition screening tool and intervene as necessary. Determine patient's food preferences and provide high-protein, high-caloric foods as appropriate.     INTERVENTIONS:  - Monitor oral intake, urinary output, labs, and treatment plans  - Assess nutrition and hydration status and recommend course of action  - Evaluate amount of meals eaten  - Assist patient with eating if necessary   - Allow adequate time for meals  - Recommend/ encourage appropriate diets, oral nutritional supplements, and vitamin/mineral supplements  - Order, calculate, and assess calorie counts as needed  - Recommend, monitor, and adjust tube feedings and TPN/PPN based on assessed needs  - Assess need for intravenous fluids  - Provide specific nutrition/hydration education as appropriate  - Include patient/family/caregiver in decisions related to nutrition  Outcome: Progressing

## 2025-01-16 NOTE — ASSESSMENT & PLAN NOTE
In the setting of acute infection.  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 Mental status improved   -Monitor mental status

## 2025-01-16 NOTE — PLAN OF CARE
Problem: PHYSICAL THERAPY ADULT  Goal: Performs mobility at highest level of function for planned discharge setting.  See evaluation for individualized goals.  Description: Treatment/Interventions: ADL retraining, Functional transfer training, LE strengthening/ROM, Therapeutic exercise, Endurance training, Cognitive reorientation, Patient/family training, Equipment eval/education, Bed mobility, Gait training, Compensatory technique education          See flowsheet documentation for full assessment, interventions and recommendations.  Outcome: Progressing  Note: Prognosis: Good  Problem List: Decreased strength, Decreased range of motion, Decreased endurance, Impaired balance, Decreased mobility, Decreased coordination, Decreased cognition, Impaired judgement, Decreased safety awareness  Assessment: patient cooperative, following one step commands and demonstrating improving gait balance and endurance. Patient will benefit from continued PT with progression as tolerated and increasing functional mobility with clinical staff as well        Rehab Resource Intensity Level, PT: II (Moderate Resource Intensity)    See flowsheet documentation for full assessment.

## 2025-01-16 NOTE — CASE MANAGEMENT
Case Management Discharge Planning Note    Patient name Pavan Edward  Location [address] [address]/[address] [place] [address]-* MRN [id number]  : 1935 Date 2025       Current Admission Date: 1/10/2025  Current Admission Diagnosis:Complicated UTI (urinary tract infection)   Patient Active Problem List    Diagnosis Date Noted Date Diagnosed    Urinary tract infection associated with indwelling urethral catheter  (HCC) 2025     Bacteremia due to Pseudomonas 2025     Acute metabolic encephalopathy 2025     Complicated UTI (urinary tract infection) 2025     Pain in both lower extremities 2025     PAD (peripheral artery disease) ([facility]) 2025     Gross hematuria 2025     Urinary retention 2025     Aortic dissection, abdominal ([facility]) 2024     Other constipation 2024     Thrombocytopenia ([facility]) 2024     B12 deficiency 2024     Positive D dimer 2024     Paroxysmal atrial fibrillation ([facility]) 2024     Hypothyroidism 2024     CAD (coronary artery disease) 2024     Hypertension 2024     COVID 2024     Weakness 2024     Ambulatory dysfunction 2024     Aneurysm of common iliac artery (HCC) 2024     Renal cyst, left 2024     Parkinson's disease ([facility]) 2024       LOS (days): 6  Geometric Mean LOS (GMLOS) (days): 4.8  Days to GMLOS:-0.8     OBJECTIVE:  Risk of Unplanned Readmission Score: 16.59         Current admission status: Inpatient   Preferred Pharmacy:   [facility] - [place], [place] - [address]  [address]  [facility] [address]  Phone: [phone] Fax: [phone]    Primary Care Provider: Phil Major    Primary Insurance: A[employer] REP  Secondary Insurance:     DISCHARGE DETAILS:    Discharge planning discussed with:: Spouse Billigene  Freedom of Choice: Yes  Comments - Freedom of Choice: Call made to spouse to advise that per [facility] admissions there is no available male  bed. They do not anticipate a bed to open until next week at least. Bed is available at original facility of [facility]. Billigene confirmed choice to move forward with admission to [facility] pending insurance authorization.  CM contacted family/caregiver?: Yes    Contacts  Patient Contacts: Billigene Wood (spouse)  Relationship to Patient:: Family  Contact Method: Phone  Phone Number: [phone]  Reason/Outcome: Continuity of Care, Emergency Contact, Discharge Planning    Other Referral/Resources/Interventions Provided:  Interventions: Short Term Rehab  Referral Comments: CMDS tasked in Aidin to submit for STR authorization. SW advised [facility] of IVABX need through 1/21. Admissions confirming with nursing re appropriate line for patient. Attending aware.     Treatment Team Recommendation: Short Term Rehab  Discharge Destination Plan:: Short Term Rehab

## 2025-01-16 NOTE — ASSESSMENT & PLAN NOTE
Urinary source  Continue IV cefepime as above  Case management accepting facility okay with peripheral IV

## 2025-01-16 NOTE — CASE MANAGEMENT
[facility] received request for authorization from Care Manager.  Authorization request submitted for: [facility]  Facility Name: [facility].  NPI: [id number]  Facility MD: Dr. Zepeda  NPI: [id number]  Authorization initiated by contacting insurance:  GINGER  Via: H&CC Portal   Clinicals submitted via Portal attachment   Pending Reference #:did not generate yet      Care Manager notified: daryl muniz     Updates to authorization status will be noted in chart. Please reach out to CM for updates on any clinical information.

## 2025-01-16 NOTE — ASSESSMENT & PLAN NOTE
In the setting of recurrent Sparks catheter insertion and removal with recent hematuria  Urine cultures positive for Pseudomonas aeruginosa, Enterococcus and Chryseobacterium indologene  Continue IV cefepime renally dosed  through 1/21 per discussion with ID  Catheter removed 1/15/2025 and he is voiding without significant PVRs however continue to follow.  Urology and ID following.  Input noted  Rehab planned insurance Auth pending

## 2025-01-16 NOTE — ASSESSMENT & PLAN NOTE
In the setting of recent hematuria, Sparks catheter insertion/removal over the past few weeks.  UA with pyuria although difficult to assess in the setting of the Sparks catheter.  Urine culture is growing Pseudomonas, 1 set blood culture is positive as well.  Urine culture also shows growth of Chryseobacterium indologenes and Enterococcus which represent urinary colonization. CT A/P suggestive of cystitis. Passed void trial   -Continue IV cefepime   -Urology follow up

## 2025-01-16 NOTE — PROGRESS NOTES
Progress Note - Hospitalist   Name: Pavan Edward 89 y.o. male I MRN: [id number]  Unit/Bed#: 3 [address] I Date of Admission: 1/10/2025   Date of Service: 1/16/2025 I Hospital Day: 6    Assessment & Plan  Urinary tract infection associated with indwelling urethral catheter  (HCC)  In the setting of recurrent Sparks catheter insertion and removal with recent hematuria  Urine cultures positive for Pseudomonas aeruginosa, Enterococcus and Chryseobacterium indologene  Continue IV cefepime renally dosed  through 1/21 per discussion with ID  Catheter removed 1/15/2025 and he is voiding without significant PVRs however continue to follow.  Urology and ID following.  Input noted  Rehab planned insurance Auth pending  Complicated UTI (urinary tract infection)  See plan above  Bacteremia due to Pseudomonas  Urinary source  Continue IV cefepime as above  Case management accepting facility okay with peripheral IV    Gross hematuria  Urinary retention with traumatic Sparks catheter insertion/removal  Urine is clearing  Urology following, recommending voiding trials with Sparks catheter removal 1/15  Hemoglobin is stable.  Continue to monitor while on Eliquis and Brilinta  Continue Flomax  CAD (coronary artery disease)  Follow with cardiology  Denies chest pain or palpations   S/p PCI x2 on 8/13/2023  Brilinta resumed  Continue atorvastatin     Hypertension  Blood pressure reviewed and is elevated however does have orthostasis not unexpected with his Parkinson therefore continue to monitor blood pressures carefully  Continue amlodipine 5 mg daily and lisinopril to 10 mg daily  Paroxysmal atrial fibrillation (HCC)  Rates are controlled.  Not on rate control medications  AC with Eliquis  Parkinson's disease ([facility])  Chronic   Home medication carbidopa-levodopa continued  PT OT consulted.  Recommending STR  PAD (peripheral artery disease) ([facility])  Followed by vascular surgery  Continue atorvastatin, Brilinta and Eliquis    Acute  metabolic encephalopathy  CT head with no acute intracranial process.  Appears back to baseline    Aortic dissection, abdominal (HCC)    Pain in both lower extremities  POA with bilateral leg pain behind his knees and swelling  Per wife patient Eliquis and ticagrelor have been on hold since he developed hematuria   DVT studies were negative  PRN Tylenol for pain  Elevated lower extremities     VTE Pharmacologic Prophylaxis:   Moderate Risk (Score 3-4) - Pharmacological DVT Prophylaxis Ordered: apixaban (Eliquis).    Mobility:   Basic Mobility Inpatient Raw Score: 13  -HLM Goal: 4: Move to chair/commode  JH-HLM Achieved: 7: Walk 25 feet or more  JH-HLM Goal NOT achieved. Continue with multidisciplinary rounding and encourage appropriate mobility to improve upon JH-HLM goals.    Patient Centered Rounds: I performed bedside rounds with nursing staff today.   Discussions with Specialists or Other Care Team Provider:     Education and Discussions with Family / Patient: Updated  (wife) at bedside.    Current Length of Stay: 6 day(s)  Current Patient Status: Inpatient   Certification Statement: The patient will continue to require additional inpatient hospital stay due to rehab placement pending  Discharge Plan: Anticipate discharge in 24-48 hrs to rehab facility.    Code Status: Level 3 - DNAR and DNI    Subjective   Patient was seen examined at bedside with wife present in a.m.  Overall doing well without any specific complaints  Voiding  Bowel movements multiple; they want bowel regimen held  Did ambulate with nursing felt tired blood pressure was positive for orthostasis with recovery once he sat down.      Objective :  Temp:  [97.5 °F (36.4 °C)-97.7 °F (36.5 °C)] 97.5 °F (36.4 °C)  HR:  [] 57  BP: ()/(53-86) 115/64  Resp:  [17-18] 17  SpO2:  [95 %-96 %] 96 %  O2 Device: None (Room air)    Body mass index is 23.75 kg/m².     Input and Output Summary (last 24 hours):     Intake/Output Summary  (Last 24 hours) at 1/16/2025 1814  Last data filed at 1/16/2025 1627  Gross per 24 hour   Intake --   Output 825 ml   Net -825 ml       Physical Exam  Constitutional:       Appearance: Normal appearance.   HENT:      Head: Normocephalic and atraumatic.      Mouth/Throat:      Mouth: Mucous membranes are dry.   Eyes:      Extraocular Movements: Extraocular movements intact.      Pupils: Pupils are equal, round, and reactive to light.   Cardiovascular:      Rate and Rhythm: Normal rate and regular rhythm.      Pulses: Normal pulses.      Heart sounds: Normal heart sounds.   Pulmonary:      Effort: Pulmonary effort is normal. No respiratory distress.      Breath sounds: Normal breath sounds. No wheezing.   Abdominal:      General: Abdomen is flat. Bowel sounds are normal.      Palpations: Abdomen is soft.   Genitourinary:     Comments: Yellow urine noted in urinal  Skin:     General: Skin is warm and dry.   Neurological:      Mental Status: He is alert.      Comments: Tremors.   Psychiatric:         Mood and Affect: Mood normal.         Behavior: Behavior normal.           Lines/Drains:              Lab Results: I have reviewed the following results:   Results from last 7 days   Lab Units 01/16/25  0432 01/15/25  0513   WBC Thousand/uL 6.31 5.06   HEMOGLOBIN g/dL 11.8* 11.9*   HEMATOCRIT % 36.1* 36.8   PLATELETS Thousands/uL 211 196   SEGS PCT %  --  68   LYMPHO PCT %  --  13*   MONO PCT %  --  12   EOS PCT %  --  5     Results from last 7 days   Lab Units 01/16/25  0432 01/15/25  0513 01/14/25  0458   SODIUM mmol/L 140   < > 143   POTASSIUM mmol/L 3.8   < > 4.5   CHLORIDE mmol/L 112*   < > 109*   CO2 mmol/L 27   < > 25   BUN mg/dL 19   < > 17   CREATININE mg/dL 0.93   < > 0.91   ANION GAP mmol/L 1*   < > 9   CALCIUM mg/dL 8.6   < > 7.7*   ALBUMIN g/dL  --   --  3.0*   TOTAL BILIRUBIN mg/dL  --   --  0.62   ALK PHOS U/L  --   --  40   ALT U/L  --   --  9   AST U/L  --   --  8*   GLUCOSE RANDOM mg/dL 87   < > 77    < >  = values in this interval not displayed.         Results from last 7 days   Lab Units 01/13/25  0712 01/10/25  1909   POC GLUCOSE mg/dl 104 131         Results from last 7 days   Lab Units 01/14/25  0458 01/13/25  0604 01/10/25  2210   LACTIC ACID mmol/L  --   --  0.6   PROCALCITONIN ng/ml 1.43* 2.05*  --        Recent Cultures (last 7 days):   Results from last 7 days   Lab Units 01/13/25  1331 01/10/25  2025 01/10/25  1944   BLOOD CULTURE  No Growth at 48 hrs.  No Growth at 48 hrs. No Growth After 5 Days.  Pseudomonas aeruginosa*  --    GRAM STAIN RESULT   --  Gram negative rods*  --    URINE CULTURE   --   --  >100,000 cfu/ml Chryseobacterium indologenes*  >100,000 cfu/ml Pseudomonas aeruginosa*  >100,000 cfu/ml Enterococcus species*             Last 24 Hours Medication List:     Current Facility-Administered Medications:     acetaminophen (TYLENOL) tablet 650 mg, Q6H PRN    amLODIPine (NORVASC) tablet 5 mg, Daily    apixaban (ELIQUIS) tablet 5 mg, BID    carbidopa-levodopa (SINEMET)  mg per tablet 1 tablet, TID    cefepime (MAXIPIME) IVPB (premix in dextrose) 2,000 mg 50 mL, Q12H, Last Rate: 2,000 mg (01/16/25 1629)    Cholecalciferol (VITAMIN D3) tablet 2,000 Units, Daily    docusate sodium (COLACE) capsule 100 mg, BID    gabapentin (NEURONTIN) capsule 100 mg, HS    levothyroxine tablet 100 mcg, Early Morning    lisinopril (ZESTRIL) tablet 10 mg, Daily    ondansetron (ZOFRAN) injection 4 mg, Q6H PRN    polyethylene glycol (MIRALAX) packet 17 g, Daily PRN    polyethylene glycol (MIRALAX) packet 17 g, HS    senna (SENOKOT) tablet 8.6 mg, BID    tamsulosin (FLOMAX) capsule 0.4 mg, Daily With Dinner    ticagrelor tablet 60 mg, Q12H SAM    Administrative Statements   Today, Patient Was Seen By: Dana Urbina MD      **Please Note: This note may have been constructed using a voice recognition system.**

## 2025-01-16 NOTE — ASSESSMENT & PLAN NOTE
Blood pressure reviewed and is elevated however does have orthostasis not unexpected with his Parkinson therefore continue to monitor blood pressures carefully  Continue amlodipine 5 mg daily and lisinopril to 10 mg daily

## 2025-01-16 NOTE — PROGRESS NOTES
Progress Note - Infectious Disease   Name: Pavan Edward 89 y.o. male I MRN: [id number]  Unit/Bed#: 3 [address] I Date of Admission: 1/10/2025   Date of Service: 1/16/2025 I Hospital Day: 6    Assessment & Plan  Bacteremia due to Pseudomonas  1 of 2 sets admission blood cultures is growing Pseudomonas aeruginosa. Due to a urinary source; UA shows pyuria and urine culture is also growing Pseudomonas as well as Chyyseobacterium indologenes.  CT A/P shows perivesicular fat stranding, bladder wall thickening and enhancement suggesting cystitis. WBC count has normalized.  The patient is hemodynamically stable.  No intravascular prosthetic devices. TTE no vegetations.  Repeat blood cultures no growth to date.   -Continue IV cefepime 2g every 12 hours   -Follow-up repeat blood cultures   -Will avoid fluoroquinolones due to patient's iliac artery aneurysm, age   -Recommend a 10-day total course of IV cefepime, through 1/21/2025   -Okay for PICC or midline placement due to short remaining duration of antibiotics, unless the rehab facility is able to provide antibiotics via a peripheral IV   -Monitor CBC, CMP to assess for treatment response and drug toxicity   -No formal ID follow-up needed  Complicated UTI (urinary tract infection)  In the setting of recent hematuria, Sparks catheter insertion/removal over the past few weeks.  UA with pyuria although difficult to assess in the setting of the Sparks catheter.  Urine culture is growing Pseudomonas, 1 set blood culture is positive as well.  Urine culture also shows growth of Chryseobacterium indologenes and Enterococcus which represent urinary colonization. CT A/P suggestive of cystitis. Passed void trial   -Continue IV cefepime   -Urology follow up  Acute metabolic encephalopathy  In the setting of acute infection.  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 Mental status improved   -Monitor mental status  Parkinson's disease  (HCC)  Continue home medications per primary.  Paroxysmal atrial fibrillation (HCC)  Anticoagulation per primary.  Aortic dissection, abdominal (HCC)  Patient with a history of infrarenal aortic dissection, known iliac artery aneurysm.  Will avoid fluoroquinolones.    Above management plan to continue Cefepime discussed with the patient and wife at bedside. ID will follow.    Antibiotics:  Cefepime day 5    Subjective   The patient states he is feeling better than he has in a long time. He denies fever, chills, nausea, diarrhea. Moving his bowels. Endorses chronic back pain.    Objective :  Temp:  [97.5 °F (36.4 °C)-97.7 °F (36.5 °C)] 97.5 °F (36.4 °C)  HR:  [] 57  BP: ()/(53-86) 115/64  Resp:  [17-18] 17  SpO2:  [95 %-96 %] 96 %  O2 Device: None (Room air)    General: chronically ill appearing but no acute distress, awake and alert  Pulmonary:  Normal respiratory excursion without accessory muscle use  Abdomen:  Soft, nontender  : Sparks catheter in place with yellow urine  Extremities:  No edema  Skin:  No rashes  Neuro: Upper extremity tremor      Lab Results: I have reviewed the following results:  Results from last 7 days   Lab Units 01/16/25  0432 01/15/25  0513 01/14/25  0458   WBC Thousand/uL 6.31 5.06 5.74   HEMOGLOBIN g/dL 11.8* 11.9* 12.0   PLATELETS Thousands/uL 211 196 176     Results from last 7 days   Lab Units 01/16/25  0432 01/15/25  0513 01/14/25  0458 01/13/25  0604 01/12/25  0714   SODIUM mmol/L 140 141 143 140 139   POTASSIUM mmol/L 3.8 4.0 4.5 3.8 4.4   CHLORIDE mmol/L 112* 115* 109* 110* 108   CO2 mmol/L 27 25 25 24 25   BUN mg/dL 19 15 17 12 19   CREATININE mg/dL 0.93 0.85 0.91 0.76 0.84   EGFR ml/min/1.73sq m 72 77 74 80 77   CALCIUM mg/dL 8.6 8.2* 7.7* 8.2* 7.6*   AST U/L  --   --  8* 9* 8*   ALT U/L  --   --  9 4* 4*   ALK PHOS U/L  --   --  40 39 40   ALBUMIN g/dL  --   --  3.0* 3.0* 2.9*     Results from last 7 days   Lab Units 01/13/25  1331 01/10/25  2025 01/10/25  1944    BLOOD CULTURE  No Growth at 48 hrs.  No Growth at 48 hrs. No Growth After 5 Days.  Pseudomonas aeruginosa*  --    GRAM STAIN RESULT   --  Gram negative rods*  --    URINE CULTURE   --   --  >100,000 cfu/ml Chryseobacterium indologenes*  >100,000 cfu/ml Pseudomonas aeruginosa*  >100,000 cfu/ml Enterococcus species*     Results from last 7 days   Lab Units 01/14/25  0458 01/13/25  0604   PROCALCITONIN ng/ml 1.43* 2.05*                 CT A/P reviewed and shows perivesicular stranding, bladder wall thickening

## 2025-01-17 VITALS
WEIGHT: 180 LBS | RESPIRATION RATE: 17 BRPM | HEIGHT: 73 IN | OXYGEN SATURATION: 98 % | SYSTOLIC BLOOD PRESSURE: 117 MMHG | HEART RATE: 68 BPM | TEMPERATURE: 97.5 F | BODY MASS INDEX: 23.86 KG/M2 | DIASTOLIC BLOOD PRESSURE: 79 MMHG

## 2025-01-17 LAB
ANION GAP SERPL CALCULATED.3IONS-SCNC: 5 MMOL/L (ref 4–13)
BASOPHILS # BLD AUTO: 0.04 THOUSANDS/ΜL (ref 0–0.1)
BASOPHILS NFR BLD AUTO: 1 % (ref 0–1)
BUN SERPL-MCNC: 24 MG/DL (ref 5–25)
CALCIUM SERPL-MCNC: 7.8 MG/DL (ref 8.4–10.2)
CHLORIDE SERPL-SCNC: 108 MMOL/L (ref 96–108)
CO2 SERPL-SCNC: 26 MMOL/L (ref 21–32)
CREAT SERPL-MCNC: 0.89 MG/DL (ref 0.6–1.3)
EOSINOPHIL # BLD AUTO: 0.21 THOUSAND/ΜL (ref 0–0.61)
EOSINOPHIL NFR BLD AUTO: 3 % (ref 0–6)
ERYTHROCYTE [DISTWIDTH] IN BLOOD BY AUTOMATED COUNT: 12.6 % (ref 11.6–15.1)
GFR SERPL CREATININE-BSD FRML MDRD: 75 ML/MIN/1.73SQ M
GLUCOSE SERPL-MCNC: 88 MG/DL (ref 65–140)
HCT VFR BLD AUTO: 34.6 % (ref 36.5–49.3)
HGB BLD-MCNC: 11.3 G/DL (ref 12–17)
IMM GRANULOCYTES # BLD AUTO: 0.1 THOUSAND/UL (ref 0–0.2)
IMM GRANULOCYTES NFR BLD AUTO: 2 % (ref 0–2)
LYMPHOCYTES # BLD AUTO: 0.79 THOUSANDS/ΜL (ref 0.6–4.47)
LYMPHOCYTES NFR BLD AUTO: 12 % (ref 14–44)
MAGNESIUM SERPL-MCNC: 2 MG/DL (ref 1.9–2.7)
MCH RBC QN AUTO: 28.9 PG (ref 26.8–34.3)
MCHC RBC AUTO-ENTMCNC: 32.7 G/DL (ref 31.4–37.4)
MCV RBC AUTO: 89 FL (ref 82–98)
MONOCYTES # BLD AUTO: 0.57 THOUSAND/ΜL (ref 0.17–1.22)
MONOCYTES NFR BLD AUTO: 9 % (ref 4–12)
NEUTROPHILS # BLD AUTO: 4.92 THOUSANDS/ΜL (ref 1.85–7.62)
NEUTS SEG NFR BLD AUTO: 73 % (ref 43–75)
NRBC BLD AUTO-RTO: 0 /100 WBCS
PLATELET # BLD AUTO: 212 THOUSANDS/UL (ref 149–390)
PMV BLD AUTO: 11.3 FL (ref 8.9–12.7)
POTASSIUM SERPL-SCNC: 3.5 MMOL/L (ref 3.5–5.3)
RBC # BLD AUTO: 3.91 MILLION/UL (ref 3.88–5.62)
SODIUM SERPL-SCNC: 139 MMOL/L (ref 135–147)
WBC # BLD AUTO: 6.63 THOUSAND/UL (ref 4.31–10.16)

## 2025-01-17 PROCEDURE — 99238 HOSP IP/OBS DSCHRG MGMT 30/<: CPT | Performed by: FAMILY MEDICINE

## 2025-01-17 PROCEDURE — 80048 BASIC METABOLIC PNL TOTAL CA: CPT | Performed by: HOSPITALIST

## 2025-01-17 PROCEDURE — 99233 SBSQ HOSP IP/OBS HIGH 50: CPT | Performed by: STUDENT IN AN ORGANIZED HEALTH CARE EDUCATION/TRAINING PROGRAM

## 2025-01-17 PROCEDURE — 85025 COMPLETE CBC W/AUTO DIFF WBC: CPT | Performed by: HOSPITALIST

## 2025-01-17 PROCEDURE — 83735 ASSAY OF MAGNESIUM: CPT | Performed by: HOSPITALIST

## 2025-01-17 RX ORDER — POTASSIUM CHLORIDE 1500 MG/1
40 TABLET, EXTENDED RELEASE ORAL ONCE
Status: COMPLETED | OUTPATIENT
Start: 2025-01-17 | End: 2025-01-17

## 2025-01-17 RX ORDER — TAMSULOSIN HYDROCHLORIDE 0.4 MG/1
0.4 CAPSULE ORAL
Start: 2025-01-17

## 2025-01-17 RX ORDER — ACETAMINOPHEN 325 MG/1
650 TABLET ORAL EVERY 6 HOURS PRN
Start: 2025-01-17 | End: 2025-01-22

## 2025-01-17 RX ORDER — CEFEPIME HYDROCHLORIDE 2 G/50ML
2000 INJECTION, SOLUTION INTRAVENOUS EVERY 12 HOURS
Start: 2025-01-17 | End: 2025-01-21

## 2025-01-17 RX ORDER — SENNOSIDES 8.6 MG
8.6 TABLET ORAL 2 TIMES DAILY
Start: 2025-01-17

## 2025-01-17 RX ORDER — AMLODIPINE BESYLATE 5 MG/1
5 TABLET ORAL DAILY
Start: 2025-01-18

## 2025-01-17 RX ORDER — LISINOPRIL 10 MG/1
10 TABLET ORAL DAILY
Start: 2025-01-18

## 2025-01-17 RX ORDER — DOCUSATE SODIUM 100 MG/1
100 CAPSULE, LIQUID FILLED ORAL 2 TIMES DAILY
Start: 2025-01-17

## 2025-01-17 RX ADMIN — LISINOPRIL 10 MG: 10 TABLET ORAL at 10:20

## 2025-01-17 RX ADMIN — LEVOTHYROXINE SODIUM 100 MCG: 100 TABLET ORAL at 05:05

## 2025-01-17 RX ADMIN — APIXABAN 5 MG: 5 TABLET, FILM COATED ORAL at 10:21

## 2025-01-17 RX ADMIN — DOCUSATE SODIUM 100 MG: 100 CAPSULE, LIQUID FILLED ORAL at 10:21

## 2025-01-17 RX ADMIN — AMLODIPINE BESYLATE 5 MG: 5 TABLET ORAL at 10:21

## 2025-01-17 RX ADMIN — POTASSIUM CHLORIDE 40 MEQ: 1500 TABLET, EXTENDED RELEASE ORAL at 10:25

## 2025-01-17 RX ADMIN — TICAGRELOR 60 MG: 60 TABLET ORAL at 10:26

## 2025-01-17 RX ADMIN — CEFEPIME HYDROCHLORIDE 2000 MG: 2 INJECTION, SOLUTION INTRAVENOUS at 04:55

## 2025-01-17 RX ADMIN — CHOLECALCIFEROL (VITAMIN D3) 10 MCG (400 UNIT) TABLET 2000 UNITS: at 10:21

## 2025-01-17 RX ADMIN — CARBIDOPA AND LEVODOPA 1 TABLET: 25; 100 TABLET ORAL at 10:21

## 2025-01-17 NOTE — CASE MANAGEMENT
Case Management Discharge Planning Note    Patient name Pavan Edward  Location [address] [address]/[address]-* MRN [id number]  : 1935 Date 2025       Current Admission Date: 1/10/2025  Current Admission Diagnosis:Complicated UTI (urinary tract infection)   Patient Active Problem List    Diagnosis Date Noted Date Diagnosed    Urinary tract infection associated with indwelling urethral catheter  (HCC) 2025     Bacteremia due to Pseudomonas 2025     Acute metabolic encephalopathy 2025     Complicated UTI (urinary tract infection) 2025     Pain in both lower extremities 2025     [facility] 2025     Gross hematuria 2025     Urinary retention 2025     Aortic dissection, abdominal ([facility]) 2024     Other constipation 2024     Thrombocytopenia ([facility]) 2024     B12 deficiency 2024     Positive D dimer 2024     Paroxysmal atrial fibrillation ([facility]) 2024     Hypothyroidism 2024     CAD (coronary artery disease) 2024     Hypertension 2024     COVID 2024     Weakness 2024     Ambulatory dysfunction 2024     Aneurysm of common iliac artery ([facility]) 2024     Renal cyst, left 2024     Parkinson's disease ([facility]) 2024       LOS (days): 7  Geometric Mean LOS (GMLOS) (days): 4.8  Days to GMLOS:-1.9     OBJECTIVE:  Risk of Unplanned Readmission Score: 19.49         Current admission status: Inpatient   Preferred Pharmacy:   [facility] [address] [place] [address] - [address]  [address]  [facility] [address]  Phone: [phone] Fax: [phone]    Primary Care Provider: Phil Major    Primary Insurance: GINGER  REP  Secondary Insurance:     DISCHARGE DETAILS:    Discharge planning discussed with:: Spouse Billigene CM contacted family/caregiver?: Yes    Contacts  Patient Contacts: Billigene Wood (spouse)  Relationship to Patient:: Family  Contact  Method: Phone  Phone Number: [phone]  Reason/Outcome: Continuity of Care, Emergency Contact, Discharge Planning    Other Referral/Resources/Interventions Provided:  Referral Comments: SW provided auth information to CCL admissions via Aidin. Facility confirmed they can accept with a midline. Nursing/Attending notified. Midline placed. Facility, spouse, and care team aware of 1500 pickup.     Treatment Team Recommendation: Short Term Rehab  Discharge Destination Plan:: Short Term Rehab  Transport at Discharge : Wheelchair van     Number/Name of Dispatcher: [facility]  Transported by (Company and Unit #): [employer]  ETA of Transport (Date): 01/17/25  ETA of Transport (Time): 1500     IMM Given (Date):: 01/17/25  IMM Given to:: Family  IMM reviewed with patient's caregiver, patient's caregiver agrees with discharge determination.     Accepting Facility Name, City & State : [facility]  Receiving Facility/Agency Phone Number: [phone]

## 2025-01-17 NOTE — DISCHARGE SUMMARY
Discharge Summary - Hospitalist   Name: Pavan Edward 89 y.o. male I MRN: [id number]  Unit/Bed#: 3 [address] I Date of Admission: 1/10/2025   Date of Service: 1/17/2025 I Hospital Day: 7     Assessment & Plan  Urinary tract infection associated with indwelling urethral catheter  (HCC)  In the setting of recurrent Sparks catheter insertion and removal with recent hematuria  Urine cultures positive for Pseudomonas aeruginosa, Enterococcus and Chryseobacterium indologene  Continue IV cefepime renally dosed  through 1/21 per discussion with ID  Catheter removed 1/15/2025 and he is voiding without significant PVRs  Urology and ID following  Plan for discharge to rehab today.  Patient will be continued on IV cefepime through January 21st, 2025 per recommendations from infectious disease.  Complicated UTI (urinary tract infection)  See plan above  Bacteremia due to Pseudomonas  Urinary source  Continue IV cefepime as above  Per case management, accepting facility okay with peripheral IV    Gross hematuria  Urinary retention with traumatic Sparks catheter insertion/removal  Urine is clearing  Urology following, s/p voiding trial on 1/15  Hemoglobin is stable.  Continue to monitor while on Eliquis and Brilinta  Continue Flomax  CAD (coronary artery disease)  Follow with cardiology  Denies chest pain or palpations   S/p PCI x2 on 8/13/2023  Brilinta resumed  Continue atorvastatin     Hypertension  Blood pressure reviewed and is elevated however does have orthostasis not unexpected with his Parkinson therefore continue to monitor blood pressures carefully  Continue amlodipine 5 mg daily and lisinopril to 10 mg daily  Paroxysmal atrial fibrillation (HCC)  Rates are controlled.  Not on rate control medications  AC with Eliquis  Parkinson's disease ([facility])  Chronic   Home medication carbidopa-levodopa continued  PT OT consulted.  Recommending STR  PAD (peripheral artery disease) ([facility])  Followed by vascular surgery  Continue  atorvastatin, Brilinta and Eliquis    Acute metabolic encephalopathy  CT head with no acute intracranial process.  Appears back to baseline    Aortic dissection, abdominal (HCC)    Pain in both lower extremities  POA with bilateral leg pain behind his knees and swelling  Per wife patient Eliquis and ticagrelor have been on hold since he developed hematuria   DVT studies were negative  PRN Tylenol for pain  Elevated lower extremities      Medical Problems       Resolved Problems  Date Reviewed: 1/13/2025   None       Discharging Physician / Practitioner: Uriel Vaughan MD  PCP: Phil Major  Admission Date:   Admission Orders (From admission, onward)       Ordered        01/10/25 2103  INPATIENT ADMISSION  Once                          Discharge Date: 01/17/25    Consultations During Hospital Stay:  Infectious disease  Urology    Reason for Admission: 89 y.o. male with a PMH of A-fib on Eliquis hypertension CAD Parkinson's CVA history aortic aneurysm PE had a Sparks placed in ED in 1225 who presents with generalized weakness and worsening parkinson symptoms the last 3 days.  Per wife, patient has a Sparks catheter due to obstruction and was seen by urology on the 1/7 for voiding trial and and Sparks catheter was replaced.  Per wife patient anticoagulation has been on hold due to hematuria on 12/30/24.  Patient denies abdominal pain, shortness of breath, pelvic pain, or CVA tenderness.  Discussed CODE STATUS with wife patient is a DNR DNI level 3.     Hospital Course: Patient was seen and examined in the emergency department and was admitted to the hospital for further evaluation and management.  Patient presenting with evidence of a complicated urinary tract infection in the setting of an indwelling Sparks catheter.  Patient was also found to have 1/2 blood cultures growing Pseudomonas aeruginosa.  Urine culture also growing Pseudomonas as well as Chryseobacterium indologenes.  White blood cell count  "normalized.  Patient continued on IV cefepime 2 g every 12 hours.  Repeat blood cultures with no growth to date.  Per recommendations from infectious disease, patient will be continued on a 10-day course of IV cefepime through January 21st, 2025.  Patient has been cleared for discharge to rehab at this time with continued outpatient follow-up.  At the time of discharge patient reports feeling significantly improved and currently denies any acute complaints or concerns including chest pain or shortness of breath.    Please see above list of diagnoses and related plan for additional information.     Condition at Discharge: stable    Discharge Day Visit / Exam:   Vitals: Blood Pressure: 117/79 (01/17/25 0900)  Pulse: 68 (01/17/25 0900)  Temperature: 97.5 °F (36.4 °C) (01/16/25 0802)  Temp Source: Oral (01/16/25 0802)  Respirations: 17 (01/16/25 2300)  Height: 6' 1\" (185.4 cm) (01/13/25 1135)  Weight - Scale: 81.6 kg (180 lb) (01/13/25 1135)  SpO2: 98 % (01/17/25 0900)  Physical Exam  HENT:      Head: Normocephalic.      Mouth/Throat:      Mouth: Mucous membranes are moist.   Eyes:      Pupils: Pupils are equal, round, and reactive to light.   Cardiovascular:      Rate and Rhythm: Normal rate.   Pulmonary:      Effort: Pulmonary effort is normal.   Abdominal:      Palpations: Abdomen is soft.   Skin:     General: Skin is warm.   Neurological:      Mental Status: He is alert.      Comments: + tremors   Psychiatric:         Behavior: Behavior normal.       Discussion with Family: Discussed with patient's wife at bedside.    Discharge instructions/Information to patient and family:   See after visit summary for information provided to patient and family.      Provisions for Follow-Up Care:  See after visit summary for information related to follow-up care and any pertinent home health orders.      Mobility at time of Discharge:   Basic Mobility Inpatient Raw Score: 14  -[facility] Goal: 4: Move to chair/commode  [facility] " Achieved: 4: Move to chair/commode     Disposition:   Rehab.    Discharge Medications:  See after visit summary for reconciled discharge medications provided to patient and/or family.      Administrative Statements   Discharge Statement:  I have spent a total time of 35 minutes in caring for this patient on the day of the visit/encounter. >30 minutes of time was spent on: Diagnostic results, Prognosis, Risks and benefits of tx options, Instructions for management, Patient and family education, Importance of tx compliance, Risk factor reductions, Impressions, Counseling / Coordination of care, Documenting in the medical record, Reviewing / ordering tests, medicine, procedures and Communicating with other healthcare professionals .    **Please Note: This note may have been constructed using a voice recognition system**

## 2025-01-17 NOTE — ASSESSMENT & PLAN NOTE
In the setting of recurrent Sparks catheter insertion and removal with recent hematuria  Urine cultures positive for Pseudomonas aeruginosa, Enterococcus and Chryseobacterium indologene  Continue IV cefepime renally dosed  through 1/21 per discussion with ID  Catheter removed 1/15/2025 and he is voiding without significant PVRs  Urology and ID following  Plan for discharge to rehab today.  Patient will be continued on IV cefepime through January 21st, 2025 per recommendations from infectious disease.

## 2025-01-17 NOTE — ASSESSMENT & PLAN NOTE
Urinary source  Continue IV cefepime as above  Per case management, accepting facility okay with peripheral IV

## 2025-01-17 NOTE — CASE MANAGEMENT
Case Management Discharge Planning Note    Patient name Pavan Edward  Location [address] [address]/[address] MRN [id number]  : 1935 Date 2025       Current Admission Date: 1/10/2025  Current Admission Diagnosis:Complicated UTI (urinary tract infection)   Patient Active Problem List    Diagnosis Date Noted Date Diagnosed    Urinary tract infection associated with indwelling urethral catheter  (HCC) 2025     Bacteremia due to Pseudomonas 2025     Acute metabolic encephalopathy 2025     Complicated UTI (urinary tract infection) 2025     Pain in both lower extremities 2025     PAD (peripheral artery disease) ([facility]) 2025     Gross hematuria 2025     Urinary retention 2025     Aortic dissection, abdominal ([facility]) 2024     Other constipation 2024     Thrombocytopenia ([facility]) 2024     B12 deficiency 2024     Positive D dimer 2024     Paroxysmal atrial fibrillation ([facility]) 2024     Hypothyroidism 2024     CAD (coronary artery disease) 2024     Hypertension 2024     COVID 2024     Weakness 2024     Ambulatory dysfunction 2024     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     Renal cyst, left 2024     Parkinson's disease ([facility]) 2024       LOS (days): 7  Geometric Mean LOS (GMLOS) (days): 4.8  Days to GMLOS:-1.7     OBJECTIVE:  Risk of Unplanned Readmission Score: 19.24         Current admission status: Inpatient   Preferred Pharmacy:   [facility] [address] [place] [address] - [address]  [address]  [facility] [address]  Phone: [phone] Fax: [phone]    Primary Care Provider: Phil Major    Primary Insurance: [employer] REP  Secondary Insurance:     DISCHARGE DETAILS:                                                                                                               Facility Insurance Auth Number: [id number]

## 2025-01-17 NOTE — CASE MANAGEMENT
[facility] has received APPROVED authorization.  Insurance: AAR    Auth obtained via portal.  Authorization received for: [facility]  Facility: [facility]   Authorization #:[id number]   Start of Care:1/16  Next Review Date:1/20  Continued Stay Care Coordinator:  n/a  Submit next review to: fax [phone]     Care Manager notified: lulú case management     Please reach out to  for updates on any clinical information.

## 2025-01-17 NOTE — PROGRESS NOTES
Progress Note - Infectious Disease   Name: Pavan Edward 89 y.o. male I MRN: [id number]  Unit/Bed#: 3 [address] I Date of Admission: 1/10/2025   Date of Service: 1/17/2025 I Hospital Day: 7    Assessment & Plan  Bacteremia due to Pseudomonas  1 of 2 sets admission blood cultures is growing Pseudomonas aeruginosa. Due to a urinary source; UA shows pyuria and urine culture is also growing Pseudomonas as well as Chyyseobacterium indologenes.  CT A/P shows perivesicular fat stranding, bladder wall thickening and enhancement suggesting cystitis. WBC count has normalized.  The patient is hemodynamically stable.  No intravascular prosthetic devices. TTE no vegetations.  Repeat blood cultures no growth to date.   -Continue IV cefepime 2g every 12 hours   -Follow-up repeat blood cultures   -Will avoid fluoroquinolones due to patient's iliac artery aneurysm, age   -Recommend a 10-day total course of IV cefepime, through 1/21/2025   -Monitor CBC, CMP to assess for treatment response and drug toxicity   -No formal ID follow-up needed   -remove midline after completion antibiotics   Complicated UTI (urinary tract infection)  In the setting of recent hematuria, Sparks catheter insertion/removal over the past few weeks.  UA with pyuria although difficult to assess in the setting of the Sparks catheter.  Urine culture is growing Pseudomonas, 1 set blood culture is positive as well.  Urine culture also shows growth of Chryseobacterium indologenes and Enterococcus which represent urinary colonization. CT A/P suggestive of cystitis. Passed void trial   -Continue IV cefepime   -Urology follow up  Acute metabolic encephalopathy  In the setting of acute infection.  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 Mental status improved   -Monitor mental status  Parkinson's disease (HCC)  Continue home medications per primary.  Paroxysmal atrial fibrillation (HCC)  Anticoagulation per primary.  Aortic  dissection, abdominal (HCC)  Patient with a history of infrarenal aortic dissection, known iliac artery aneurysm.  Will avoid fluoroquinolones.    Above management plan to continue Cefepime discussed with the patient and wife at bedside. Okay for discharge from ID perspective.    Antibiotics:  Cefepime day 6    Subjective   The patient did not sleep well last night due to frequent urination. He is tired today. Otherwise, no fever, chills, abdominal pain, nausea.    Objective :  HR:  [57-68] 68  BP: (115-172)/(64-79) 117/79  Resp:  [16-17] 17  SpO2:  [95 %-98 %] 98 %  O2 Device: None (Room air)    General: chronically ill appearing but no acute distress, awake and alert  Pulmonary:  Normal respiratory excursion without accessory muscle use  Abdomen:  Soft, nontender  Extremities:  No edema  Skin:  No rashes  Neuro: Upper extremity tremor      Lab Results: I have reviewed the following results:  Results from last 7 days   Lab Units 01/17/25  0505 01/16/25  0432 01/15/25  0513   WBC Thousand/uL 6.63 6.31 5.06   HEMOGLOBIN g/dL 11.3* 11.8* 11.9*   PLATELETS Thousands/uL 212 211 196     Results from last 7 days   Lab Units 01/17/25  0505 01/16/25  0432 01/15/25  0513 01/14/25  0458 01/13/25  0604 01/12/25  0714   SODIUM mmol/L 139 140 141 143 140 139   POTASSIUM mmol/L 3.5 3.8 4.0 4.5 3.8 4.4   CHLORIDE mmol/L 108 112* 115* 109* 110* 108   CO2 mmol/L 26 27 25 25 24 25   BUN mg/dL 24 19 15 17 12 19   CREATININE mg/dL 0.89 0.93 0.85 0.91 0.76 0.84   EGFR ml/min/1.73sq m 75 72 77 74 80 77   CALCIUM mg/dL 7.8* 8.6 8.2* 7.7* 8.2* 7.6*   AST U/L  --   --   --  8* 9* 8*   ALT U/L  --   --   --  9 4* 4*   ALK PHOS U/L  --   --   --  40 39 40   ALBUMIN g/dL  --   --   --  3.0* 3.0* 2.9*     Results from last 7 days   Lab Units 01/13/25  1331 01/10/25  2025 01/10/25  1944   BLOOD CULTURE  No Growth at 72 hrs.  No Growth at 72 hrs. No Growth After 5 Days.  Pseudomonas aeruginosa*  --    GRAM STAIN RESULT   --  Gram negative rods*   --    URINE CULTURE   --   --  >100,000 cfu/ml Chryseobacterium indologenes*  >100,000 cfu/ml Pseudomonas aeruginosa*  >100,000 cfu/ml Enterococcus species*     Results from last 7 days   Lab Units 01/14/25  0458 01/13/25  0604   PROCALCITONIN ng/ml 1.43* 2.05*

## 2025-01-17 NOTE — DISCHARGE INSTR - AVS FIRST PAGE
Please continue taking all medications as prescribed. Please follow up with your primary medical doctor within 1 week of discharge. Please return to the nearest emergency department if you develop any signs or symptoms of worsening disease or infection, including but not limited to chest pain, shortness of breath, abdominal pain, lightheadedness, dizziness, palpitations, fevers or chills.     Midline to be removed after completion of antibiotic therapy on 1/21/25.    1.5 cm hyperdense lesion arising from the posterior lower pole of the left kidney seen on imaging and appears stable and most likely a proteinaceous or hemorrhagic cyst but technically indeterminate per radiology. Recommend a nonemergent renal ultrasound for further evaluation in the outpatient setting.

## 2025-01-17 NOTE — [place] UNIVERSAL TRANSFER FORM
"[place] UNIVERSAL TRANSFER FORM  (ALL ITEMS MUST BE COMPLETED)    1. TRANSFER FROM: [facility]      TRANSFER TO: [facility]    2. DATE OF TRANSFER: 1/17/2025                        TIME OF TRANSFER: 1500    3. PATIENT NAME: Pavan Edward,        YOB: 1935                             GENDER: male    4. LANGUAGE:   English    5. PHYSICIAN NAME:  Uriel Vaughan MD                   PHONE: [phone]    6. CODE STATUS: Level 3 - DNAR and DNI        Out of Hospital DNR Attached: No    7. :                                      :  Extended Emergency Contact Information  Primary Emergency Contact: Wood,Billigene  Mobile Phone: [phone]  Relation: Spouse  Secondary Emergency Contact: Isai Edward  Mobile Phone: [phone]  Relation: Son           Health Care Representative/Proxy:  Yes           Legal Guardian:  No             NAME OF:           HEALTH CARE REPRESENTATIVE/PROXY:                                         OR           LEGAL GUARDIAN, IF NOT :                                               PHONE:  (Day)           (Night)                        (Cell)    8. REASON FOR TRANSFER: (Must include brief medical history and recent changes in physical function or cognition.) Pt was admitted for complicated UTI due to recurrent balderrama catheter insertion and removal with recent hematuria. Pt requires further antibiotic tx and had midline placed today (1/17/25) for tx.            V/S: /79 (BP Location: Right arm)   Pulse 68   Temp 97.5 °F (36.4 °C) (Oral)   Resp 17   Ht 6' 1\" (1.854 m)   Wt 81.6 kg (180 lb)   SpO2 98%   BMI 23.75 kg/m²           PAIN: None    9. PRIMARY DIAGNOSIS: Complicated UTI (urinary tract infection)      Secondary Diagnosis:         Pacemaker: No      Internal Defib: No          Mental Health Diagnosis (if Applicable):    10. RESTRAINTS: No     11. RESPIRATORY NEEDS: " None    12. ISOLATION/PRECAUTION: None    13. ALLERGY: Patient has no known allergies.    14. SENSORY:       Mildly impaired hearing and vision    15. SKIN CONDITION: No Wounds    16. DIET: Special (describe)cardiac    17. IV ACCESS: Other midline L upper arm    18. PERSONAL ITEMS SENT WITH PATIENT: None    19. ATTACHED DOCUMENTS: MUST ATTACH CURRENT MEDICATION INFORMATION Face Sheet, MAR, Diagnostic Studies, Labs, Code Status, and Discharge Summary    20. AT RISK ALERTS:Falls        HARM TO: N/A    21. WEIGHT BEARING STATUS:         Left Leg: Limited        Right Leg: Limited    22. MENTAL STATUS:Alert and Otheroriented to person, place, and situation    23. FUNCTION:        Walk: With Help        Transfer: With Help        Toilet: With Help        Feed: Self    24. IMMUNIZATIONS/SCREENING:     There is no immunization history on file for this patient.    25. BOWEL: Incontinent     26. BLADDER: Incontinent    27. SENDING FACILITY CONTACT: [facility]                  Title: [facility]        Unit: [address] [place] [address]        Phone: [phone]          REC'G FACILITY CONTACT (if known):        Title:        Unit:         Phone:         FORM PREFILLED BY (if applicable)       Title:       Unit:        Phone:         FORM COMPLETED BY Dora Collier RN      Title: [facility]      Phone: [phone]

## 2025-01-17 NOTE — PLAN OF CARE
Problem: Potential for Falls  Goal: Patient will remain free of falls  Description: INTERVENTIONS:  - Educate patient/family on patient safety including physical limitations  - Instruct patient to call for assistance with activity   - Consult OT/PT to assist with strengthening/mobility   - Keep Call bell within reach  - Keep bed low and locked with side rails adjusted as appropriate  - Keep care items and personal belongings within reach  - Initiate and maintain comfort rounds  - Make Fall Risk Sign visible to staff  - Offer Toileting every 2 Hours, in advance of need  - Initiate/Maintain  bed alarm  - Obtain necessary fall risk management equipment: such as chair alarm when OOB  - Apply yellow socks and bracelet for high fall risk patients  - Consider moving patient to room near nurses station  Outcome: Progressing     Problem: PAIN - ADULT  Goal: Verbalizes/displays adequate comfort level or baseline comfort level  Description: Interventions:  - Encourage patient to monitor pain and request assistance  - Assess pain using appropriate pain scale  - Administer analgesics based on type and severity of pain and evaluate response  - Implement non-pharmacological measures as appropriate and evaluate response  - Consider cultural and social influences on pain and pain management  - Notify physician/advanced practitioner if interventions unsuccessful or patient reports new pain  Outcome: Progressing     Problem: INFECTION - ADULT  Goal: Absence or prevention of progression during hospitalization  Description: INTERVENTIONS:  - Assess and monitor for signs and symptoms of infection  - Monitor lab/diagnostic results  - Monitor all insertion sites, i.e. indwelling lines, tubes, and drains  - Monitor endotracheal if appropriate and nasal secretions for changes in amount and color  - [place] appropriate cooling/warming therapies per order  - Administer medications as ordered  - Instruct and encourage patient and family to  use good hand hygiene technique  - Identify and instruct in appropriate isolation precautions for identified infection/condition  Outcome: Progressing  Goal: Absence of fever/infection during neutropenic period  Description: INTERVENTIONS:  - Monitor WBC    Outcome: Progressing     Problem: SAFETY ADULT  Goal: Maintain or return to baseline ADL function  Description: INTERVENTIONS:  -  Assess patient's ability to carry out ADLs; assess patient's baseline for ADL function and identify physical deficits which impact ability to perform ADLs (bathing, care of mouth/teeth, toileting, grooming, dressing, etc.)  - Assess/evaluate cause of self-care deficits   - Assess range of motion  - Assess patient's mobility; develop plan if impaired  - Assess patient's need for assistive devices and provide as appropriate  - Encourage maximum independence but intervene and supervise when necessary  - Involve family in performance of ADLs  - Assess for home care needs following discharge   - Consider OT consult to assist with ADL evaluation and planning for discharge  - Provide patient education as appropriate  Outcome: Progressing  Goal: Maintains/Returns to pre admission functional level  Description: INTERVENTIONS:  - Perform AM-PAC 6 Click Basic Mobility/ Daily Activity assessment daily.  - Set and communicate daily mobility goal to care team and patient/family/caregiver.   - Collaborate with rehabilitation services on mobility goals if consulted  - Perform Range of Motion 3 times a day.  - Reposition patient every 2 hours.  - Dangle patient 2 times a day  - Stand patient 2 times a day  - Ambulate patient 2 times a day  - Out of bed to chair 2 times a day   - Out of bed for meals 3 times a day  - Out of bed for toileting  - Record patient progress and toleration of activity level   Outcome: Progressing     Problem: DISCHARGE PLANNING  Goal: Discharge to home or other facility with appropriate resources  Description:  INTERVENTIONS:  - Identify barriers to discharge w/patient and caregiver  - Arrange for needed discharge resources and transportation as appropriate  - Identify discharge learning needs (meds, wound care, etc.)  - Arrange for interpretive services to assist at discharge as needed  - Refer to Case Management Department for coordinating discharge planning if the patient needs post-hospital services based on physician/advanced practitioner order or complex needs related to functional status, cognitive ability, or social support system  Outcome: Progressing     Problem: Knowledge Deficit  Goal: Patient/family/caregiver demonstrates understanding of disease process, treatment plan, medications, and discharge instructions  Description: Complete learning assessment and assess knowledge base.  Interventions:  - Provide teaching at level of understanding  - Provide teaching via preferred learning methods  Outcome: Progressing     Problem: Prexisting or High Potential for Compromised Skin Integrity  Goal: Skin integrity is maintained or improved  Description: INTERVENTIONS:  - Identify patients at risk for skin breakdown  - Assess and monitor skin integrity  - Assess and monitor nutrition and hydration status  - Monitor labs   - Assess for incontinence   - Turn and reposition patient  - Assist with mobility/ambulation  - Relieve pressure over bony prominences  - Avoid friction and shearing  - Provide appropriate hygiene as needed including keeping skin clean and dry  - Evaluate need for skin moisturizer/barrier cream  - Collaborate with interdisciplinary team   - Patient/family teaching  - Consider wound care consult   Outcome: Progressing     Problem: Nutrition/Hydration-ADULT  Goal: Nutrient/Hydration intake appropriate for improving, restoring or maintaining nutritional needs  Description: Monitor and assess patient's nutrition/hydration status for malnutrition. Collaborate with interdisciplinary team and initiate plan and  interventions as ordered.  Monitor patient's weight and dietary intake as ordered or per policy. Utilize nutrition screening tool and intervene as necessary. Determine patient's food preferences and provide high-protein, high-caloric foods as appropriate.     INTERVENTIONS:  - Monitor oral intake, urinary output, labs, and treatment plans  - Assess nutrition and hydration status and recommend course of action  - Evaluate amount of meals eaten  - Assist patient with eating if necessary   - Allow adequate time for meals  - Recommend/ encourage appropriate diets, oral nutritional supplements, and vitamin/mineral supplements  - Order, calculate, and assess calorie counts as needed  - Recommend, monitor, and adjust tube feedings and TPN/PPN based on assessed needs  - Assess need for intravenous fluids  - Provide specific nutrition/hydration education as appropriate  - Include patient/family/caregiver in decisions related to nutrition  Outcome: Progressing

## 2025-01-17 NOTE — PLAN OF CARE
Problem: Potential for Falls  Goal: Patient will remain free of falls  Description: INTERVENTIONS:  - Educate patient/family on patient safety including physical limitations  - Instruct patient to call for assistance with activity   - Consult OT/PT to assist with strengthening/mobility   - Keep Call bell within reach  - Keep bed low and locked with side rails adjusted as appropriate  - Keep care items and personal belongings within reach  - Initiate and maintain comfort rounds  - Make Fall Risk Sign visible to staff  - Offer Toileting every 1 Hours, in advance of need  - Initiate/Maintain bed/chair alarm  - Obtain necessary fall risk management equipment: yellow socks and fall risk bracelet  - Apply yellow socks and bracelet for high fall risk patients  - Consider moving patient to room near nurses station  Outcome: Progressing     Problem: PAIN - ADULT  Goal: Verbalizes/displays adequate comfort level or baseline comfort level  Description: Interventions:  - Encourage patient to monitor pain and request assistance  - Assess pain using appropriate pain scale  - Administer analgesics based on type and severity of pain and evaluate response  - Implement non-pharmacological measures as appropriate and evaluate response  - Consider cultural and social influences on pain and pain management  - Notify physician/advanced practitioner if interventions unsuccessful or patient reports new pain  Outcome: Progressing     Problem: INFECTION - ADULT  Goal: Absence or prevention of progression during hospitalization  Description: INTERVENTIONS:  - Assess and monitor for signs and symptoms of infection  - Monitor lab/diagnostic results  - Monitor all insertion sites, i.e. indwelling lines, tubes, and drains  - Monitor endotracheal if appropriate and nasal secretions for changes in amount and color  - [place] appropriate cooling/warming therapies per order  - Administer medications as ordered  - Instruct and encourage patient  and family to use good hand hygiene technique  - Identify and instruct in appropriate isolation precautions for identified infection/condition  Outcome: Progressing  Goal: Absence of fever/infection during neutropenic period  Description: INTERVENTIONS:  - Monitor WBC    Outcome: Progressing     Problem: SAFETY ADULT  Goal: Maintain or return to baseline ADL function  Description: INTERVENTIONS:  -  Assess patient's ability to carry out ADLs; assess patient's baseline for ADL function and identify physical deficits which impact ability to perform ADLs (bathing, care of mouth/teeth, toileting, grooming, dressing, etc.)  - Assess/evaluate cause of self-care deficits   - Assess range of motion  - Assess patient's mobility; develop plan if impaired  - Assess patient's need for assistive devices and provide as appropriate  - Encourage maximum independence but intervene and supervise when necessary  - Involve family in performance of ADLs  - Assess for home care needs following discharge   - Consider OT consult to assist with ADL evaluation and planning for discharge  - Provide patient education as appropriate  Outcome: Progressing  Goal: Maintains/Returns to pre admission functional level  Description: INTERVENTIONS:  - Perform AM-PAC 6 Click Basic Mobility/ Daily Activity assessment daily.  - Set and communicate daily mobility goal to care team and patient/family/caregiver.   - Collaborate with rehabilitation services on mobility goals if consulted  - Perform Range of Motion daily  - Reposition patient every 2 hours.  - Ambulate patient daily  - Out of bed to chair daily  - Record patient progress and toleration of activity level   Outcome: Progressing     Problem: DISCHARGE PLANNING  Goal: Discharge to home or other facility with appropriate resources  Description: INTERVENTIONS:  - Identify barriers to discharge w/patient and caregiver  - Arrange for needed discharge resources and transportation as appropriate  -  Identify discharge learning needs (meds, wound care, etc.)  - Arrange for interpretive services to assist at discharge as needed  - Refer to Case Management Department for coordinating discharge planning if the patient needs post-hospital services based on physician/advanced practitioner order or complex needs related to functional status, cognitive ability, or social support system  Outcome: Progressing     Problem: Knowledge Deficit  Goal: Patient/family/caregiver demonstrates understanding of disease process, treatment plan, medications, and discharge instructions  Description: Complete learning assessment and assess knowledge base.  Interventions:  - Provide teaching at level of understanding  - Provide teaching via preferred learning methods  Outcome: Progressing     Problem: Prexisting or High Potential for Compromised Skin Integrity  Goal: Skin integrity is maintained or improved  Description: INTERVENTIONS:  - Identify patients at risk for skin breakdown  - Assess and monitor skin integrity  - Assess and monitor nutrition and hydration status  - Monitor labs   - Assess for incontinence   - Turn and reposition patient  - Assist with mobility/ambulation  - Relieve pressure over bony prominences  - Avoid friction and shearing  - Provide appropriate hygiene as needed including keeping skin clean and dry  - Evaluate need for skin moisturizer/barrier cream  - Collaborate with interdisciplinary team   - Patient/family teaching  - Consider wound care consult   Outcome: Progressing     Problem: Nutrition/Hydration-ADULT  Goal: Nutrient/Hydration intake appropriate for improving, restoring or maintaining nutritional needs  Description: Monitor and assess patient's nutrition/hydration status for malnutrition. Collaborate with interdisciplinary team and initiate plan and interventions as ordered.  Monitor patient's weight and dietary intake as ordered or per policy. Utilize nutrition screening tool and intervene as  necessary. Determine patient's food preferences and provide high-protein, high-caloric foods as appropriate.     INTERVENTIONS:  - Monitor oral intake, urinary output, labs, and treatment plans  - Assess nutrition and hydration status and recommend course of action  - Evaluate amount of meals eaten  - Assist patient with eating if necessary   - Allow adequate time for meals  - Recommend/ encourage appropriate diets, oral nutritional supplements, and vitamin/mineral supplements  - Order, calculate, and assess calorie counts as needed  - Recommend, monitor, and adjust tube feedings and TPN/PPN based on assessed needs  - Assess need for intravenous fluids  - Provide specific nutrition/hydration education as appropriate  - Include patient/family/caregiver in decisions related to nutrition  Outcome: Progressing

## 2025-01-17 NOTE — ASSESSMENT & PLAN NOTE
Urinary retention with traumatic [place] catheter insertion/removal  Urine is clearing  Urology following, s/p voiding trial on 1/15  Hemoglobin is stable.  Continue to monitor while on Eliquis and Brilinta  Continue Flomax

## 2025-01-17 NOTE — ASSESSMENT & PLAN NOTE
1 of 2 sets admission blood cultures is growing Pseudomonas aeruginosa. Due to a urinary source; UA shows pyuria and urine culture is also growing Pseudomonas as well as Chyyseobacterium indologenes.  CT A/P shows perivesicular fat stranding, bladder wall thickening and enhancement suggesting cystitis. WBC count has normalized.  The patient is hemodynamically stable.  No intravascular prosthetic devices. TTE no vegetations.  Repeat blood cultures no growth to date.   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

## 2025-01-17 NOTE — PROGRESS NOTES
Midline placed LUE for long term antibiotics, see LDA for details. Reviewed plan with discharging provider, Dr. Vaughan. Plan as follows: Will be on IV cefepime through 1/21, with plan for midline removal after completion of antibiotic therapy.

## 2025-01-17 NOTE — NURSING NOTE
Pt discharged from [address] [place] [address]. IV removed prior to discharge. Pt left with all their belongings. Pt left via wheelchair accompanied by transport team. Discharge instructions gone over with receiving facility. No prescriptions written.  All questions answered.

## 2025-01-18 LAB
BACTERIA BLD CULT: NORMAL
BACTERIA BLD CULT: NORMAL

## 2025-01-30 ENCOUNTER — TELEPHONE (OUTPATIENT)
Age: OVER 89
End: 2025-01-30

## 2025-01-30 NOTE — TELEPHONE ENCOUNTER
Received a call from [facility] stating pt experiencing labile BP's since hospital discharge. Pt was taking Lisinopril 2.5 mg that was increased to 10 mg in the hospital  Spouse would like to take pt home tomorrow, and asking if Dr Gonzalez would decrease Lisinopril.   Pt lightheaded, dizzy when SBP dropping Into the 90's, but quickly rises to higher numbers. They will notify the the wife of the appt on Friday and hopefully she will keep the patient in rehab until he is more stable to return home.

## 2025-02-03 ENCOUNTER — TELEPHONE (OUTPATIENT)
Age: OVER 89
End: 2025-02-03

## 2025-02-03 NOTE — TELEPHONE ENCOUNTER
Patient's spouse called to cancel ov with Dr Gonzalez tomorrow 2/4.     Spouse said patient was not discharged from the rehab facility and now has diarrhea.  She did say his BP is improved after the Lisinopril was reduced back to 2.5 mg.     Spouse will call back when patient is feeling better to reschedule ov.

## 2025-02-10 PROBLEM — N39.0 COMPLICATED UTI (URINARY TRACT INFECTION): Status: RESOLVED | Noted: 2025-01-11 | Resolved: 2025-02-10

## 2025-02-14 PROBLEM — T83.511A URINARY TRACT INFECTION ASSOCIATED WITH INDWELLING URETHRAL CATHETER  (HCC): Status: RESOLVED | Noted: 2025-01-13 | Resolved: 2025-02-14

## 2025-02-14 PROBLEM — N39.0 URINARY TRACT INFECTION ASSOCIATED WITH INDWELLING URETHRAL CATHETER  (HCC): Status: RESOLVED | Noted: 2025-01-13 | Resolved: 2025-02-14

## 2025-06-16 NOTE — PROGRESS NOTES
Progress Note - Cardiology Office  [facility]    Pavan Edward 89 y.o. male MRN: [id number]  : 1935  Encounter: [id number]      Assessment & Plan  Coronary artery disease, unspecified vessel or lesion type, unspecified whether angina present, unspecified whether native or transplanted heart    Hypertension, unspecified type    Aneurysm of common iliac artery (HCC)    Paroxysmal atrial fibrillation (HCC)    Aortic dissection, abdominal (HCC)    PAD (peripheral artery disease) ([facility])       ASSESSMENT:   Paroxysmal atrial fibrillation (HCC)  On Eliquis 5 mg twice daily  In sinus rhythm today     Benign essential hypertension  On lisinopril 2.5 mg    Aortic dissection, abdominal (HCC)     Peripheral arterial disease (HCC)  Aneurysm of common iliac artery  Abdominal aortic dissection  Followed by vascular surgery  On Brilinta 60 mg every 12 hours    Pure hypercholesterolemia  2024: , TG 66, HDL 53, normal AST and ALT  Intolerant to statins whose use resulted in myalgia  > Advised on low-cholesterol diet    Ischemic stroke (HCC)     CAD in native artery  CAD  S/p PCI X2 on 2023    Bradycardia     Primary hypertension  BP is 102/60 with heart rate of 56     Parkinson's disease  Hypothyroidism  Left renal cyst  Thrombocytopenia  Parkinson's disease     Lexiscan, 01/15/2024:  LVEF estimated at 56% on stress imaging and 55% on rest imaging.   No significant ischemia myocardial perfusion imaging.      2D echo, 1/15/2024:  EF 60%, G1 DD, mild to moderate LVH, mildly dilated left atrium,          RECOMMENDATIONS:  Continue Eliquis 5 mg twice daily and lisinopril  Also on Brilinta 60 mg every 12 hours for PAD  Patient is intolerant to statins  Advised low-cholesterol diet  Follow-up with neurology and vascular surgery            Please call [phone] if any questions.    HPI :     Pavan Edward is a 89 y.o. year old male who came for follow up.  He denies any cardiac  "symptoms.  His blood pressure is well-controlled.  His last lipid panel was somewhat abnormal showing elevated LDL of 112.  Patient declined to take any statin stating that prior use has resulted in severe pain in his shoulders.  Advised him on low-cholesterol diet  He sees neurology in [place] for his Parkinson's disease    REVIEW OF SYSTEMS:  Review of Systems   Musculoskeletal:  Positive for gait problem.   Neurological:  Positive for tremors.   All other systems reviewed and are negative.        Historical Information   Past Medical History[1]  Past Surgical History[2]  Social History     Substance and Sexual Activity   Alcohol Use Never     Social History     Substance and Sexual Activity   Drug Use Never     Tobacco Use History[3]  Family History: Family History[4]    Meds/Allergies     Allergies[5]  Current Medications[6]    Vitals: Blood pressure 102/60, pulse 56, height 6' 1\" (1.854 m), weight 83 kg (183 lb), SpO2 97%.    Body mass index is 24.14 kg/m².  Vitals:    06/18/25 1109   Weight: 83 kg (183 lb)     BP Readings from Last 3 Encounters:   06/18/25 102/60   01/17/25 117/79   01/07/25 112/60       Physical Exam:  Physical Exam    Neurologic:  Alert & oriented x 3, no new focal deficits, Not in any acute distress, tremors  Constitutional:  Well developed, well nourished, non-toxic appearance   Eyes:  Pupil equal and reacting to light, conjunctiva normal,   HENT:  Atraumatic, oropharynx moist, Neck- normal range of motion, no tenderness,  Neck supple, No JVP, No LNP   Respiratory:  Bilateral air entry, mostly clear to auscultation  Cardiovascular: S1-S2 regular with a I/VI systolic murmur   GI:  Soft, nondistended, normal bowel sounds, nontender, no hepatosplenomegaly appreciated.  Musculoskeletal:  No tenderness, no deformities.   Skin:  Well hydrated, no rash   Lymphatic:  No lymphadenopathy noted   Extremities: Bilateral lower extremity varicose veins        Diagnostic Studies Review " "Cardio:      EKG: Sinus bradycardia, heart rate 56/min, LAFB, LVH, nonspecific ST abnormality    Cardiac testing:       Results for orders placed during the hospital encounter of 01/10/25    Echo complete w/ contrast if indicated    Interpretation Summary    Left Ventricle: Left ventricular cavity size is normal. Wall thickness is severely increased. There is severe concentric hypertrophy. The left ventricular ejection fraction is 60% by visual estimation. Systolic function is normal. Wall motion is normal. Diastolic function is normal.    Left Atrium: The atrium is moderately dilated (42-48 mL/m2).    Tricuspid Valve: There is mild regurgitation. The right ventricular systolic pressure is normal. The estimated right ventricular systolic pressure is 23.00 mmHg.    There is no evidence of abscess or vegetation noted.      Imaging:  Chest X-Ray:   No Chest XR results available for this patient.    CT-scan of the chest:     No CTA results available for this patient.  Lab Review   Lab Results   Component Value Date    WBC 6.63 01/17/2025    HGB 11.3 (L) 01/17/2025    HCT 34.6 (L) 01/17/2025    MCV 89 01/17/2025    RDW 12.6 01/17/2025     01/17/2025     BMP:  Lab Results   Component Value Date    SODIUM 139 01/17/2025    K 3.5 01/17/2025     01/17/2025    CO2 26 01/17/2025    BUN 24 01/17/2025    CREATININE 0.89 01/17/2025    GLUC 88 01/17/2025    CALCIUM 7.8 (L) 01/17/2025    CORRECTEDCA 8.5 01/14/2025    EGFR 75 01/17/2025    MG 2.0 01/17/2025     LFT:  Lab Results   Component Value Date    AST 8 (L) 01/14/2025    ALT 9 01/14/2025    ALKPHOS 40 01/14/2025    TP 5.4 (L) 01/14/2025    ALB 3.0 (L) 01/14/2025      No components found for: \"TSH3\"  Lab Results   Component Value Date    IIS9VMZCNAKW 3.541 01/10/2025     No results found for: \"HGBA1C\"  Lipid Profile:   Lab Results   Component Value Date    CHOLESTEROL 178 08/20/2024    HDL 53 08/20/2024    LDLCALC 112 (H) 08/20/2024    TRIG 66 08/20/2024     Lab " "Results   Component Value Date    CHOLESTEROL 178 08/20/2024     Lab Results   Component Value Date    CKTOTAL 61 08/20/2024     No results found for: \"NTBNP\"   No results found for this or any previous visit (from the past 4 weeks).          Dr. Matthias Gonzalez MD, [facility]      \"This note has been constructed using a voice recognition system.Therefore there may be syntax, spelling, and/or grammatical errors. Please call if you have any questions. \"         [1]   Past Medical History:  Diagnosis Date    AA (aortic aneurysm) (HCC)     Coronary artery disease     CVA (cerebral vascular accident) (HCC)     Parkinsons (HCC)     Pulmonary emboli (HCC)    [2]   Past Surgical History:  Procedure Laterality Date    CATARACT EXTRACTION      CORONARY ANGIOPLASTY WITH STENT PLACEMENT     [3]   Social History  Tobacco Use   Smoking Status Never   Smokeless Tobacco Never   [4] No family history on file.  [5] No Known Allergies  [6]   Current Outpatient Medications:     amLODIPine (NORVASC) 5 mg tablet, Take 1 tablet (5 mg total) by mouth daily, Disp: , Rfl:     apixaban (Eliquis) 5 mg, Take 5 mg by mouth in the morning and 5 mg in the evening., Disp: , Rfl:     carbidopa-levodopa (SINEMET)  mg per tablet, Take 1 tablet by mouth in the morning and 1 tablet in the evening and 1 tablet before bedtime., Disp: , Rfl:     Cholecalciferol 50 MCG (2000 UT) TABS, Take 50 mcg by mouth in the morning., Disp: , Rfl:     levothyroxine 100 mcg tablet, Take 1 tablet (100 mcg total) by mouth daily in the early morning, Disp: , Rfl:     ticagrelor (Brilinta) 60 MG, Take 60 mg by mouth every 12 (twelve) hours, Disp: , Rfl:     docusate sodium (COLACE) 100 mg capsule, Take 1 capsule (100 mg total) by mouth 2 (two) times a day (Patient not taking: Reported on 6/18/2025), Disp: , Rfl:     lisinopril (ZESTRIL) 10 mg tablet, Take 1 tablet (10 mg total) by mouth daily (Patient not taking: Reported on 6/18/2025), Disp: , Rfl:     polyethylene glycol " (MIRALAX) 17 g packet, Take 17 g by mouth daily as needed (constipation) (Patient not taking: Reported on 6/18/2025), Disp: , Rfl:     senna (SENOKOT) 8.6 mg, Take 1 tablet (8.6 mg total) by mouth 2 (two) times a day (Patient not taking: Reported on 6/18/2025), Disp: , Rfl:     tamsulosin (FLOMAX) 0.4 mg, Take 1 capsule (0.4 mg total) by mouth daily with dinner (Patient not taking: Reported on 6/18/2025), Disp: , Rfl:

## 2025-06-18 ENCOUNTER — OFFICE VISIT (OUTPATIENT)
Dept: CARDIOLOGY CLINIC | Facility: CLINIC | Age: OVER 89
End: 2025-06-18
Payer: COMMERCIAL

## 2025-06-18 VITALS
HEIGHT: 73 IN | OXYGEN SATURATION: 97 % | WEIGHT: 183 LBS | SYSTOLIC BLOOD PRESSURE: 102 MMHG | BODY MASS INDEX: 24.25 KG/M2 | DIASTOLIC BLOOD PRESSURE: 60 MMHG | HEART RATE: 56 BPM

## 2025-06-18 DIAGNOSIS — I10 HYPERTENSION, UNSPECIFIED TYPE: ICD-10-CM

## 2025-06-18 DIAGNOSIS — I48.0 PAROXYSMAL ATRIAL FIBRILLATION (HCC): Primary | ICD-10-CM

## 2025-06-18 DIAGNOSIS — I25.10 CORONARY ARTERY DISEASE, UNSPECIFIED VESSEL OR LESION TYPE, UNSPECIFIED WHETHER ANGINA PRESENT, UNSPECIFIED WHETHER NATIVE OR TRANSPLANTED HEART: ICD-10-CM

## 2025-06-18 DIAGNOSIS — I71.02 AORTIC DISSECTION, ABDOMINAL (HCC): ICD-10-CM

## 2025-06-18 DIAGNOSIS — I72.3 ANEURYSM OF COMMON ILIAC ARTERY (HCC): ICD-10-CM

## 2025-06-18 DIAGNOSIS — I73.9 PAD (PERIPHERAL ARTERY DISEASE) (HCC): ICD-10-CM

## 2025-06-18 PROCEDURE — 99214 OFFICE O/P EST MOD 30 MIN: CPT | Performed by: INTERNAL MEDICINE

## 2025-06-18 PROCEDURE — 93000 ELECTROCARDIOGRAM COMPLETE: CPT | Performed by: INTERNAL MEDICINE

## 2025-08-08 ENCOUNTER — TELEPHONE (OUTPATIENT)
Age: OVER 89
End: 2025-08-08